# Patient Record
Sex: MALE | Race: WHITE | Employment: FULL TIME | URBAN - METROPOLITAN AREA
[De-identification: names, ages, dates, MRNs, and addresses within clinical notes are randomized per-mention and may not be internally consistent; named-entity substitution may affect disease eponyms.]

---

## 2017-06-22 ENCOUNTER — APPOINTMENT (OUTPATIENT)
Dept: GENERAL RADIOLOGY | Age: 52
End: 2017-06-22
Attending: EMERGENCY MEDICINE
Payer: COMMERCIAL

## 2017-06-22 ENCOUNTER — HOSPITAL ENCOUNTER (EMERGENCY)
Age: 52
Discharge: HOME | End: 2017-06-22
Attending: EMERGENCY MEDICINE
Payer: COMMERCIAL

## 2017-06-22 VITALS
WEIGHT: 182 LBS | TEMPERATURE: 98 F | SYSTOLIC BLOOD PRESSURE: 183 MMHG | BODY MASS INDEX: 26.96 KG/M2 | HEART RATE: 112 BPM | HEIGHT: 69 IN | DIASTOLIC BLOOD PRESSURE: 88 MMHG | OXYGEN SATURATION: 99 % | RESPIRATION RATE: 18 BRPM

## 2017-06-22 DIAGNOSIS — R03.0 ELEVATED BLOOD PRESSURE READING: ICD-10-CM

## 2017-06-22 DIAGNOSIS — C18.9 COLON CARCINOMA (HCC): ICD-10-CM

## 2017-06-22 DIAGNOSIS — E86.0 DEHYDRATION: Primary | ICD-10-CM

## 2017-06-22 DIAGNOSIS — R73.9 HYPERGLYCEMIA: ICD-10-CM

## 2017-06-22 LAB
ALBUMIN SERPL BCP-MCNC: 3.6 G/DL (ref 3.4–5)
ALBUMIN/GLOB SERPL: 0.7 {RATIO} (ref 0.8–1.7)
ALP SERPL-CCNC: 284 U/L (ref 45–117)
ALT SERPL-CCNC: 45 U/L (ref 16–61)
ANION GAP BLD CALC-SCNC: 15 MMOL/L (ref 3–18)
AST SERPL W P-5'-P-CCNC: 30 U/L (ref 15–37)
BASOPHILS # BLD AUTO: 0 K/UL (ref 0–0.06)
BASOPHILS # BLD: 0 % (ref 0–2)
BILIRUB SERPL-MCNC: 0.9 MG/DL (ref 0.2–1)
BUN SERPL-MCNC: 17 MG/DL (ref 7–18)
BUN/CREAT SERPL: 18 (ref 12–20)
CALCIUM SERPL-MCNC: 10 MG/DL (ref 8.5–10.1)
CHLORIDE SERPL-SCNC: 93 MMOL/L (ref 100–108)
CO2 SERPL-SCNC: 23 MMOL/L (ref 21–32)
CREAT SERPL-MCNC: 0.93 MG/DL (ref 0.6–1.3)
DIFFERENTIAL METHOD BLD: ABNORMAL
EOSINOPHIL # BLD: 0.1 K/UL (ref 0–0.4)
EOSINOPHIL NFR BLD: 1 % (ref 0–5)
ERYTHROCYTE [DISTWIDTH] IN BLOOD BY AUTOMATED COUNT: 15.5 % (ref 11.6–14.5)
GLOBULIN SER CALC-MCNC: 5 G/DL (ref 2–4)
GLUCOSE BLD STRIP.AUTO-MCNC: 266 MG/DL (ref 70–110)
GLUCOSE BLD STRIP.AUTO-MCNC: 380 MG/DL (ref 70–110)
GLUCOSE SERPL-MCNC: 436 MG/DL (ref 74–99)
HCT VFR BLD AUTO: 38.1 % (ref 36–48)
HGB BLD-MCNC: 13.2 G/DL (ref 13–16)
LYMPHOCYTES # BLD AUTO: 18 % (ref 21–52)
LYMPHOCYTES # BLD: 1.5 K/UL (ref 0.9–3.6)
MAGNESIUM SERPL-MCNC: 1.6 MG/DL (ref 1.6–2.6)
MCH RBC QN AUTO: 28.2 PG (ref 24–34)
MCHC RBC AUTO-ENTMCNC: 34.6 G/DL (ref 31–37)
MCV RBC AUTO: 81.4 FL (ref 74–97)
MONOCYTES # BLD: 0.7 K/UL (ref 0.05–1.2)
MONOCYTES NFR BLD AUTO: 8 % (ref 3–10)
NEUTS SEG # BLD: 6.2 K/UL (ref 1.8–8)
NEUTS SEG NFR BLD AUTO: 73 % (ref 40–73)
PLATELET # BLD AUTO: 215 K/UL (ref 135–420)
PMV BLD AUTO: 9.9 FL (ref 9.2–11.8)
POTASSIUM SERPL-SCNC: 3.9 MMOL/L (ref 3.5–5.5)
PROT SERPL-MCNC: 8.6 G/DL (ref 6.4–8.2)
RBC # BLD AUTO: 4.68 M/UL (ref 4.7–5.5)
SODIUM SERPL-SCNC: 131 MMOL/L (ref 136–145)
WBC # BLD AUTO: 8.6 K/UL (ref 4.6–13.2)

## 2017-06-22 PROCEDURE — 74011636637 HC RX REV CODE- 636/637: Performed by: EMERGENCY MEDICINE

## 2017-06-22 PROCEDURE — 96375 TX/PRO/DX INJ NEW DRUG ADDON: CPT

## 2017-06-22 PROCEDURE — 83735 ASSAY OF MAGNESIUM: CPT | Performed by: EMERGENCY MEDICINE

## 2017-06-22 PROCEDURE — 74011250636 HC RX REV CODE- 250/636: Performed by: EMERGENCY MEDICINE

## 2017-06-22 PROCEDURE — 96374 THER/PROPH/DIAG INJ IV PUSH: CPT

## 2017-06-22 PROCEDURE — 96361 HYDRATE IV INFUSION ADD-ON: CPT

## 2017-06-22 PROCEDURE — 99284 EMERGENCY DEPT VISIT MOD MDM: CPT

## 2017-06-22 PROCEDURE — 82962 GLUCOSE BLOOD TEST: CPT

## 2017-06-22 PROCEDURE — 80053 COMPREHEN METABOLIC PANEL: CPT | Performed by: EMERGENCY MEDICINE

## 2017-06-22 PROCEDURE — 74022 RADEX COMPL AQT ABD SERIES: CPT

## 2017-06-22 PROCEDURE — 85025 COMPLETE CBC W/AUTO DIFF WBC: CPT | Performed by: EMERGENCY MEDICINE

## 2017-06-22 RX ORDER — ONDANSETRON 2 MG/ML
4 INJECTION INTRAMUSCULAR; INTRAVENOUS
Status: COMPLETED | OUTPATIENT
Start: 2017-06-22 | End: 2017-06-22

## 2017-06-22 RX ORDER — PROMETHAZINE HYDROCHLORIDE 25 MG/1
25 SUPPOSITORY RECTAL
Qty: 12 SUPPOSITORY | Refills: 0 | Status: SHIPPED | OUTPATIENT
Start: 2017-06-22 | End: 2017-07-01

## 2017-06-22 RX ORDER — OXYCODONE AND ACETAMINOPHEN 5; 325 MG/1; MG/1
1 TABLET ORAL
Qty: 20 TAB | Refills: 0 | Status: SHIPPED | OUTPATIENT
Start: 2017-06-22

## 2017-06-22 RX ORDER — SODIUM CHLORIDE 9 MG/ML
2000 INJECTION, SOLUTION INTRAVENOUS ONCE
Status: COMPLETED | OUTPATIENT
Start: 2017-06-22 | End: 2017-06-22

## 2017-06-22 RX ORDER — DOCUSATE SODIUM 100 MG/1
100 CAPSULE, LIQUID FILLED ORAL 2 TIMES DAILY
Qty: 20 CAP | Refills: 0 | Status: SHIPPED | OUTPATIENT
Start: 2017-06-22 | End: 2017-07-02

## 2017-06-22 RX ORDER — HYDROMORPHONE HYDROCHLORIDE 1 MG/ML
1 INJECTION, SOLUTION INTRAMUSCULAR; INTRAVENOUS; SUBCUTANEOUS ONCE
Status: COMPLETED | OUTPATIENT
Start: 2017-06-22 | End: 2017-06-22

## 2017-06-22 RX ADMIN — HYDROMORPHONE HYDROCHLORIDE 1 MG: 1 INJECTION, SOLUTION INTRAMUSCULAR; INTRAVENOUS; SUBCUTANEOUS at 18:27

## 2017-06-22 RX ADMIN — ONDANSETRON 4 MG: 2 INJECTION INTRAMUSCULAR; INTRAVENOUS at 16:58

## 2017-06-22 RX ADMIN — HUMAN INSULIN 10 UNITS: 100 INJECTION, SOLUTION SUBCUTANEOUS at 17:53

## 2017-06-22 RX ADMIN — SODIUM CHLORIDE 2000 ML: 900 INJECTION, SOLUTION INTRAVENOUS at 16:58

## 2017-06-22 NOTE — ED PROVIDER NOTES
HPI Comments: 4:36 PM Orion Serna is a 50 y.o. male with h/o HTN, DM, Colon Cancer who presents to ED complaining of back pain that has worsened after his 8th dose of chemo on May 30th. He is also experiencing some nausea and vomiting. His last episode of vomiting was around 2:30am this morning; he states he has not been eating or drinking normally due to the nausea and vomiting. He is being treated at [address] in [place]; he has had half of his colon removed and 9 lymph nodes. His next appointment is this coming Monday for more scans; he called his care management team today and they told him to come into the hospital for some pain medication and help with his dehydration. He is experiencing some tingling in his hands and feet bilaterally but it could be secondary to his diabetic neuropathy. There are no other symptoms at this time. PCP: Major Bhat MD      The history is provided by the patient and the spouse. No  was used. Past Medical History:   Diagnosis Date    [facility] ([facility]     [facility]     Hypercholesteremia     Hypertension     Sleep apnea     C-PAP       Past Surgical History:   Procedure Laterality Date    COLONOSCOPY N/A 8/11/2016    COLONOSCOPY performed by Toya Machado MD at [facility] ENDOSCOPY    HX APPENDECTOMY      HX TONSILLECTOMY           History reviewed. No pertinent family history. Social History     Social History    Marital status:      Spouse name: N/A    Number of children: N/A    Years of education: N/A     Occupational History    Not on file. Social History Main Topics    Smoking status: Never Smoker    Smokeless tobacco: Never Used    Alcohol use No    Drug use: No    Sexual activity: Not on file     Other Topics Concern    Not on file     Social History Narrative         ALLERGIES: Review of patient's allergies indicates no known allergies.     Review of Systems Constitutional: Negative for chills and fever. Respiratory: Negative for shortness of breath. Cardiovascular: Negative for chest pain. Gastrointestinal: Positive for nausea and vomiting. Negative for abdominal pain and diarrhea. Musculoskeletal: Positive for back pain. Negative for neck pain. Neurological: Negative for headaches. All other systems reviewed and are negative. Vitals:    06/22/17 1812 06/22/17 1813 06/22/17 1814 06/22/17 1815   BP:       Pulse:       Resp:       Temp:       SpO2: 100% 100% 100% 100%   Weight:       Height:                Physical Exam   Constitutional: He is oriented to person, place, and time. He appears well-developed and well-nourished. No distress. HENT:   Head: Normocephalic and atraumatic. Mouth/Throat: Oropharynx is clear and moist.   Eyes: Conjunctivae and EOM are normal. Pupils are equal, round, and reactive to light. No scleral icterus. Neck: Normal range of motion. Neck supple. Cardiovascular: Normal rate, regular rhythm and normal heart sounds. No murmur heard. Pulmonary/Chest: Effort normal and breath sounds normal. No respiratory distress. Abdominal: Soft. Bowel sounds are normal. He exhibits no distension. There is no tenderness. Musculoskeletal: He exhibits no edema. Lymphadenopathy:     He has no cervical adenopathy. Neurological: He is alert and oriented to person, place, and time. Coordination normal.   Skin: Skin is warm and dry. No rash noted. Psychiatric: He has a normal mood and affect. His behavior is normal.   Nursing note and vitals reviewed.        MDM  Number of Diagnoses or Management Options  Colon carcinoma [facility]):   Dehydration:   Elevated blood pressure reading:   Hyperglycemia:   Diagnosis management comments: H/o colon ca with recurrent n/v unable to hold down fluids also abd pain and back pain denies any radiculopathy bowel or bladder symptoms doubt cauda equina syndrome in ED has follow up for imaging on Monday    aas no evidence of obstruction     Improved after fluids and pain meds in ED     ED Course       Procedures    Vitals:  Patient Vitals for the past 12 hrs:   Temp Pulse Resp BP SpO2   06/22/17 1815 - - - - 100 %   06/22/17 1814 - - - - 100 %   06/22/17 1813 - - - - 100 %   06/22/17 1812 - - - - 100 %   06/22/17 1811 - - - - 100 %   06/22/17 1810 - - - - 100 %   06/22/17 1809 - - - - 100 %   06/22/17 1808 - - - - 100 %   06/22/17 1807 - - - - 100 %   06/22/17 1806 - - - - 100 %   06/22/17 1804 - - - (!) 161/101 -   06/22/17 1611 98 °F (36.7 °C) (!) 112 18 (!) 155/105 100 %       Medications ordered:   Medications   0.9% sodium chloride infusion 2,000 mL (2,000 mL IntraVENous New Bag 6/22/17 1658)   ondansetron (ZOFRAN) injection 4 mg (4 mg IntraVENous Given 6/22/17 1658)   insulin regular (NOVOLIN R, HUMULIN R) injection 10 Units (10 Units IntraVENous Given 6/22/17 1753)   HYDROmorphone (PF) (DILAUDID) injection 1 mg (1 mg IntraVENous Given 6/22/17 1827)         Lab findings:  Recent Results (from the past 12 hour(s))   METABOLIC PANEL, COMPREHENSIVE    Collection Time: 06/22/17  4:41 PM   Result Value Ref Range    Sodium 131 (L) 136 - 145 mmol/L    Potassium 3.9 3.5 - 5.5 mmol/L    Chloride 93 (L) 100 - 108 mmol/L    CO2 23 21 - 32 mmol/L    Anion gap 15 3.0 - 18 mmol/L    Glucose 436 (HH) 74 - 99 mg/dL    BUN 17 7.0 - 18 MG/DL    Creatinine 0.93 0.6 - 1.3 MG/DL    BUN/Creatinine ratio 18 12 - 20      GFR est AA >60 >60 ml/min/1.73m2    GFR est non-AA >60 >60 ml/min/1.73m2    Calcium 10.0 8.5 - 10.1 MG/DL    Bilirubin, total 0.9 0.2 - 1.0 MG/DL    ALT (SGPT) 45 16 - 61 U/L    AST (SGOT) 30 15 - 37 U/L    Alk.  phosphatase 284 (H) 45 - 117 U/L    Protein, total 8.6 (H) 6.4 - 8.2 g/dL    Albumin 3.6 3.4 - 5.0 g/dL    Globulin 5.0 (H) 2.0 - 4.0 g/dL    A-G Ratio 0.7 (L) 0.8 - 1.7     MAGNESIUM    Collection Time: 06/22/17  4:41 PM   Result Value Ref Range    Magnesium 1.6 1.6 - 2.6 mg/dL   CBC WITH AUTOMATED DIFF    Collection Time: 06/22/17  4:41 PM   Result Value Ref Range    WBC 8.6 4.6 - 13.2 K/uL    RBC 4.68 (L) 4.70 - 5.50 M/uL    HGB 13.2 13.0 - 16.0 g/dL    HCT 38.1 36.0 - 48.0 %    MCV 81.4 74.0 - 97.0 FL    MCH 28.2 24.0 - 34.0 PG    MCHC 34.6 31.0 - 37.0 g/dL    RDW 15.5 (H) 11.6 - 14.5 %    PLATELET 623 189 - 259 K/uL    MPV 9.9 9.2 - 11.8 FL    NEUTROPHILS 73 40 - 73 %    LYMPHOCYTES 18 (L) 21 - 52 %    MONOCYTES 8 3 - 10 %    EOSINOPHILS 1 0 - 5 %    BASOPHILS 0 0 - 2 %    ABS. NEUTROPHILS 6.2 1.8 - 8.0 K/UL    ABS. LYMPHOCYTES 1.5 0.9 - 3.6 K/UL    ABS. MONOCYTES 0.7 0.05 - 1.2 K/UL    ABS. EOSINOPHILS 0.1 0.0 - 0.4 K/UL    ABS. BASOPHILS 0.0 0.0 - 0.06 K/UL    DF AUTOMATED     GLUCOSE, POC    Collection Time: 06/22/17  5:01 PM   Result Value Ref Range    Glucose (POC) 380 (H) 70 - 110 mg/dL   GLUCOSE, POC    Collection Time: 06/22/17  6:57 PM   Result Value Ref Range    Glucose (POC) 266 (H) 70 - 110 mg/dL       EKG interpretation by ED Physician:    X-Ray, CT or other radiology findings or impressions:  XR ABD ACUTE W 1 V CHEST    (Results Pending)   IMPRESSION:  1) Increased stool, no air fluid levels, no obvious obstruction. Interpreted by Torie Sales MD 6:29 PM     Reevaluation of patient:   6:39 PM Patient feels better; will PO challenge and discharge home. Disposition:  Diagnosis:   1. Dehydration    2. Hyperglycemia    3. Elevated blood pressure reading    4.  Colon carcinoma ([address]        Disposition: Discharged in stable condition      Follow-up Information     Follow up With Details Comments Contact [facility] EMERGENCY DEPT  As needed, If symptoms worsen [address] [facility]    Nani Arroyo MD Schedule an appointment as soon as possible for a visit for ED follow up appointment  [address] [place] [address],[address]  [phone]             Patient's Medications   Start Taking    DOCUSATE SODIUM (COLACE) 100 MG CAPSULE    Take 1 Cap by mouth two (2) times a day for 10 days. OXYCODONE-ACETAMINOPHEN (PERCOCET) 5-325 MG PER TABLET    Take 1 Tab by mouth every four (4) hours as needed for Pain. Max Daily Amount: 6 Tabs. Continue Taking    AMLODIPINE (NORVASC) 10 MG TABLET    Take 10 mg by mouth daily. ATORVASTATIN (LIPITOR) 10 MG TABLET    Take 20 mg by mouth daily. CANAGLIFLOZIN (INVOKANA) 300 MG TABLET    Take 300 mg by mouth Daily (before breakfast). CYCLOBENZAPRINE (FLEXERIL) 10 MG TABLET        ERYTHROMYCIN 500 MG TABLET    Take one tab at 3 PM, 5 PM, and 9 PM the day before surgery. GABAPENTIN (NEURONTIN) 400 MG CAPSULE    Take 400 mg by mouth as needed. INSULIN ASPART (NOVOLOG) 100 UNIT/ML INJECTION    by SubCUTAneous route. INSULIN DETEMIR (LEVEMIR) 100 UNIT/ML INJECTION    by SubCUTAneous route nightly. LOSARTAN (COZAAR) 100 MG TABLET    Take 100 mg by mouth daily. NEOMYCIN (MYCIFRADIN) 500 MG TABLET    Take two tabs at 3 PM, 5 PM, and 9 PM the day before surgery. PEG 3350-ELECTROLYTES (COLYTE) 605-17.03-7.91 -5.84 GRAM SOLUTION    Take as directed   These Medications have changed    No medications on file   Stop Taking    No medications on file     Scribe Attestation  Aida Sanders acting as a scribe for and in the presence of Virginia Pruett MD  June 22, 2017 at 7:15 PM       Provider Attestation:  I personally performed the services described in the documentation, reviewed the documentation, as recorded by the scribe in my presence, and it accurately and completely records my words and actions.   Virginia Pruett MD      Signed by: Aida Rucker, June 22, 2017 at 7:15 PM

## 2017-06-22 NOTE — ED TRIAGE NOTES
\"I'm going through chemo and the l;ast time was May 30th and started having nausea, cramping,m low back pain and gradually it progressed and threw up a week ago and on all kinds of medications and the nausea medication not helping and not able to eat. \"

## 2017-06-22 NOTE — DISCHARGE INSTRUCTIONS
Elevated Blood Pressure: Care Instructions  Your Care Instructions    Blood pressure is a measure of how hard the blood pushes against the walls of your arteries. It's normal for blood pressure to go up and down throughout the day. But if it stays up over time, you have high blood pressure. Two numbers tell you your blood pressure. The first number is the systolic pressure. It shows how hard the blood pushes when your heart is pumping. The second number is the diastolic pressure. It shows how hard the blood pushes between heartbeats, when your heart is relaxed and filling with blood. An ideal blood pressure in adults is less than 120/80 (say \"120 over 80\"). High blood pressure is 140/90 or higher. You have high blood pressure if your top number is 140 or higher or your bottom number is 90 or higher, or both. The main test for high blood pressure is simple, fast, and painless. To diagnose high blood pressure, your doctor will test your blood pressure at different times. After testing your blood pressure, your doctor may ask you to test it again when you are home. If you are diagnosed with high blood pressure, you can work with your doctor to make a long-term plan to manage it. Follow-up care is a key part of your treatment and safety. Be sure to make and go to all appointments, and call your doctor if you are having problems. It's also a good idea to know your test results and keep a list of the medicines you take. How can you care for yourself at home? · Do not smoke. Smoking increases your risk for heart attack and stroke. If you need help quitting, talk to your doctor about stop-smoking programs and medicines. These can increase your chances of quitting for good. · Stay at a healthy weight. · Try to limit how much sodium you eat to less than 2,300 milligrams (mg) a day. Your doctor may ask you to try to eat less than 1,500 mg a day. · Be physically active.  Get at least 30 minutes of exercise on most days of the week. Walking is a good choice. You also may want to do other activities, such as running, swimming, cycling, or playing tennis or team sports. · Avoid or limit alcohol. Talk to your doctor about whether you can drink any alcohol. · Eat plenty of fruits, vegetables, and low-fat dairy products. Eat less saturated and total fats. · Learn how to check your blood pressure at home. When should you call for help? Call your doctor now or seek immediate medical care if:  · Your blood pressure is much higher than normal (such as 180/110 or higher). · You think high blood pressure is causing symptoms such as:  ¨ Severe headache. ¨ Blurry vision. Watch closely for changes in your health, and be sure to contact your doctor if:  · You do not get better as expected. Where can you learn more? Go to http://talia-anders.info/. Enter J054 in the search box to learn more about \"Elevated Blood Pressure: Care Instructions. \"  Current as of: April 3, 2017  Content Version: 11.3  © 1198-1687 [employer]. Care instructions adapted under license by [employer] (which disclaims liability or warranty for this information). If you have questions about a medical condition or this instruction, always ask your healthcare professional. [address] any warranty or liability for your use of this information.

## 2017-06-22 NOTE — ED NOTES
Patient requests additional nausea medication. Dr. Kodak Ratliff notified. I have reviewed discharge instructions with the patient and spouse. The patient and spouse verbalized understanding.

## 2017-06-25 ENCOUNTER — APPOINTMENT (OUTPATIENT)
Dept: GENERAL RADIOLOGY | Age: 52
DRG: 872 | End: 2017-06-25
Attending: EMERGENCY MEDICINE
Payer: COMMERCIAL

## 2017-06-25 ENCOUNTER — HOSPITAL ENCOUNTER (INPATIENT)
Age: 52
LOS: 6 days | Discharge: HOME | DRG: 872 | End: 2017-07-01
Attending: EMERGENCY MEDICINE | Admitting: FAMILY MEDICINE
Payer: COMMERCIAL

## 2017-06-25 DIAGNOSIS — N39.0 ACUTE UTI: ICD-10-CM

## 2017-06-25 DIAGNOSIS — Z85.038 HISTORY OF COLON CANCER: ICD-10-CM

## 2017-06-25 DIAGNOSIS — A41.9 SEPSIS, DUE TO UNSPECIFIED ORGANISM: ICD-10-CM

## 2017-06-25 DIAGNOSIS — N17.9 AKI (ACUTE KIDNEY INJURY) (HCC): Primary | ICD-10-CM

## 2017-06-25 PROBLEM — D72.829 LEUKOCYTOSIS: Status: ACTIVE | Noted: 2017-06-25

## 2017-06-25 PROBLEM — E87.20 LACTIC ACIDOSIS: Status: ACTIVE | Noted: 2017-06-25

## 2017-06-25 PROBLEM — E87.6 HYPOKALEMIA: Status: ACTIVE | Noted: 2017-06-25

## 2017-06-25 LAB
ALBUMIN SERPL BCP-MCNC: 3.5 G/DL (ref 3.4–5)
ALBUMIN/GLOB SERPL: 0.7 {RATIO} (ref 0.8–1.7)
ALP SERPL-CCNC: 313 U/L (ref 45–117)
ALT SERPL-CCNC: 59 U/L (ref 16–61)
ANION GAP BLD CALC-SCNC: 11 MMOL/L (ref 3–18)
APPEARANCE UR: ABNORMAL
AST SERPL W P-5'-P-CCNC: 151 U/L (ref 15–37)
BACTERIA URNS QL MICRO: ABNORMAL /HPF
BASOPHILS # BLD AUTO: 0 K/UL (ref 0–0.06)
BASOPHILS # BLD: 0 % (ref 0–2)
BILIRUB SERPL-MCNC: 0.8 MG/DL (ref 0.2–1)
BILIRUB UR QL: ABNORMAL
BUN SERPL-MCNC: 25 MG/DL (ref 7–18)
BUN/CREAT SERPL: 10 (ref 12–20)
CALCIUM SERPL-MCNC: 8.4 MG/DL (ref 8.5–10.1)
CHLORIDE SERPL-SCNC: 95 MMOL/L (ref 100–108)
CO2 SERPL-SCNC: 25 MMOL/L (ref 21–32)
COLOR UR: ABNORMAL
CREAT SERPL-MCNC: 2.59 MG/DL (ref 0.6–1.3)
DIFFERENTIAL METHOD BLD: ABNORMAL
EOSINOPHIL # BLD: 0.1 K/UL (ref 0–0.4)
EOSINOPHIL NFR BLD: 1 % (ref 0–5)
EPITH CASTS URNS QL MICRO: ABNORMAL /LPF (ref 0–5)
ERYTHROCYTE [DISTWIDTH] IN BLOOD BY AUTOMATED COUNT: 15.7 % (ref 11.6–14.5)
GLOBULIN SER CALC-MCNC: 5.1 G/DL (ref 2–4)
GLUCOSE SERPL-MCNC: 148 MG/DL (ref 74–99)
GLUCOSE UR STRIP.AUTO-MCNC: >1000 MG/DL
HCT VFR BLD AUTO: 37.6 % (ref 36–48)
HGB BLD-MCNC: 12.9 G/DL (ref 13–16)
HGB UR QL STRIP: NEGATIVE
KETONES UR QL STRIP.AUTO: ABNORMAL MG/DL
LACTATE BLD-SCNC: 2.5 MMOL/L (ref 0.4–2)
LEUKOCYTE ESTERASE UR QL STRIP.AUTO: NEGATIVE
LYMPHOCYTES # BLD AUTO: 11 % (ref 21–52)
LYMPHOCYTES # BLD: 1.6 K/UL (ref 0.9–3.6)
MAGNESIUM SERPL-MCNC: 1.6 MG/DL (ref 1.6–2.6)
MCH RBC QN AUTO: 28.1 PG (ref 24–34)
MCHC RBC AUTO-ENTMCNC: 34.3 G/DL (ref 31–37)
MCV RBC AUTO: 81.9 FL (ref 74–97)
MONOCYTES # BLD: 1.9 K/UL (ref 0.05–1.2)
MONOCYTES NFR BLD AUTO: 12 % (ref 3–10)
NEUTS SEG # BLD: 11.7 K/UL (ref 1.8–8)
NEUTS SEG NFR BLD AUTO: 76 % (ref 40–73)
NITRITE UR QL STRIP.AUTO: NEGATIVE
PH UR STRIP: 5 [PH] (ref 5–8)
PLATELET # BLD AUTO: 195 K/UL (ref 135–420)
PMV BLD AUTO: 9.9 FL (ref 9.2–11.8)
POTASSIUM SERPL-SCNC: 3 MMOL/L (ref 3.5–5.5)
PROT SERPL-MCNC: 8.6 G/DL (ref 6.4–8.2)
PROT UR STRIP-MCNC: 100 MG/DL
RBC # BLD AUTO: 4.59 M/UL (ref 4.7–5.5)
RBC #/AREA URNS HPF: ABNORMAL /HPF (ref 0–5)
SODIUM SERPL-SCNC: 131 MMOL/L (ref 136–145)
SP GR UR REFRACTOMETRY: 1.02 (ref 1–1.03)
UROBILINOGEN UR QL STRIP.AUTO: 1 EU/DL (ref 0.2–1)
WBC # BLD AUTO: 15.3 K/UL (ref 4.6–13.2)
WBC URNS QL MICRO: ABNORMAL /HPF (ref 0–4)

## 2017-06-25 PROCEDURE — 83735 ASSAY OF MAGNESIUM: CPT | Performed by: EMERGENCY MEDICINE

## 2017-06-25 PROCEDURE — 87086 URINE CULTURE/COLONY COUNT: CPT | Performed by: EMERGENCY MEDICINE

## 2017-06-25 PROCEDURE — 74011250636 HC RX REV CODE- 250/636: Performed by: EMERGENCY MEDICINE

## 2017-06-25 PROCEDURE — 99285 EMERGENCY DEPT VISIT HI MDM: CPT

## 2017-06-25 PROCEDURE — 65660000001 HC RM ICU INTERMED STEPDOWN

## 2017-06-25 PROCEDURE — 96367 TX/PROPH/DG ADDL SEQ IV INF: CPT

## 2017-06-25 PROCEDURE — 85025 COMPLETE CBC W/AUTO DIFF WBC: CPT | Performed by: EMERGENCY MEDICINE

## 2017-06-25 PROCEDURE — 74011250637 HC RX REV CODE- 250/637: Performed by: EMERGENCY MEDICINE

## 2017-06-25 PROCEDURE — 93005 ELECTROCARDIOGRAM TRACING: CPT

## 2017-06-25 PROCEDURE — 80053 COMPREHEN METABOLIC PANEL: CPT | Performed by: EMERGENCY MEDICINE

## 2017-06-25 PROCEDURE — 77030003560 HC NDL HUBR BARD -A

## 2017-06-25 PROCEDURE — 81001 URINALYSIS AUTO W/SCOPE: CPT | Performed by: EMERGENCY MEDICINE

## 2017-06-25 PROCEDURE — 71010 XR CHEST PORT: CPT

## 2017-06-25 PROCEDURE — 74011000258 HC RX REV CODE- 258: Performed by: EMERGENCY MEDICINE

## 2017-06-25 PROCEDURE — 74011636637 HC RX REV CODE- 636/637: Performed by: FAMILY MEDICINE

## 2017-06-25 PROCEDURE — 87040 BLOOD CULTURE FOR BACTERIA: CPT | Performed by: EMERGENCY MEDICINE

## 2017-06-25 PROCEDURE — 83605 ASSAY OF LACTIC ACID: CPT

## 2017-06-25 PROCEDURE — 96365 THER/PROPH/DIAG IV INF INIT: CPT

## 2017-06-25 RX ORDER — POTASSIUM CHLORIDE 20 MEQ/1
40 TABLET, EXTENDED RELEASE ORAL
Status: COMPLETED | OUTPATIENT
Start: 2017-06-25 | End: 2017-06-25

## 2017-06-25 RX ORDER — LEVOFLOXACIN 5 MG/ML
750 INJECTION, SOLUTION INTRAVENOUS
Status: DISCONTINUED | OUTPATIENT
Start: 2017-06-27 | End: 2017-06-26

## 2017-06-25 RX ORDER — SODIUM CHLORIDE 0.9 % (FLUSH) 0.9 %
5-10 SYRINGE (ML) INJECTION AS NEEDED
Status: DISCONTINUED | OUTPATIENT
Start: 2017-06-25 | End: 2017-07-01 | Stop reason: HOSPADM

## 2017-06-25 RX ORDER — OXYCODONE AND ACETAMINOPHEN 5; 325 MG/1; MG/1
1 TABLET ORAL
Status: COMPLETED | OUTPATIENT
Start: 2017-06-25 | End: 2017-06-25

## 2017-06-25 RX ORDER — LEVOFLOXACIN 5 MG/ML
750 INJECTION, SOLUTION INTRAVENOUS EVERY 24 HOURS
Status: DISCONTINUED | OUTPATIENT
Start: 2017-06-25 | End: 2017-06-25

## 2017-06-25 RX ORDER — LOSARTAN POTASSIUM 50 MG/1
100 TABLET ORAL DAILY
Status: DISCONTINUED | OUTPATIENT
Start: 2017-06-26 | End: 2017-06-25

## 2017-06-25 RX ORDER — SODIUM CHLORIDE 9 MG/ML
200 INJECTION, SOLUTION INTRAVENOUS CONTINUOUS
Status: DISCONTINUED | OUTPATIENT
Start: 2017-06-25 | End: 2017-06-26

## 2017-06-25 RX ADMIN — OXYCODONE HYDROCHLORIDE AND ACETAMINOPHEN 1 TABLET: 5; 325 TABLET ORAL at 20:28

## 2017-06-25 RX ADMIN — LEVOFLOXACIN 750 MG: 5 INJECTION, SOLUTION INTRAVENOUS at 20:31

## 2017-06-25 RX ADMIN — SODIUM CHLORIDE 2586 ML: 900 INJECTION, SOLUTION INTRAVENOUS at 20:07

## 2017-06-25 RX ADMIN — SODIUM CHLORIDE 1000 MG: 900 INJECTION, SOLUTION INTRAVENOUS at 22:15

## 2017-06-25 RX ADMIN — PIPERACILLIN SODIUM,TAZOBACTAM SODIUM 4.5 G: 4; .5 INJECTION, POWDER, FOR SOLUTION INTRAVENOUS at 20:11

## 2017-06-25 RX ADMIN — POTASSIUM CHLORIDE 40 MEQ: 1500 TABLET, FILM COATED, EXTENDED RELEASE ORAL at 22:28

## 2017-06-25 RX ADMIN — INSULIN DETEMIR 65 UNITS: 100 INJECTION, SOLUTION SUBCUTANEOUS at 22:26

## 2017-06-25 RX ADMIN — SODIUM CHLORIDE 200 ML/HR: 900 INJECTION, SOLUTION INTRAVENOUS at 22:17

## 2017-06-25 NOTE — ED PROVIDER NOTES
HPI Comments: Vilma Mccoy is a 50 y.o. Male with h/o stage 3 colon ca currently undergoing chemo in [facility] with noted fever today 102, called his oncologist who instructed him to come in for eval. Stated he started having chills this morning and felt generally unwell later with noted fever. Lightheaded. No syncope. No sore throat, cough, new headache, neck pain, rash, urinary issue. Has chronic low back pain. Was seen recently for constipation and had complete relief today. Has not taken anything for fever based upon instruction from oncologist. Fever in constant with no exacerbating factors    The history is provided by the patient and the spouse. Past Medical History:   Diagnosis Date    [facility]     Diabetes ([facility]     Hypercholesteremia     Hypertension     Sleep apnea     C-PAP       Past Surgical History:   Procedure Laterality Date    COLONOSCOPY N/A 8/11/2016    COLONOSCOPY performed by Rocio Carrington MD at [facility] ENDOSCOPY    HX APPENDECTOMY      HX TONSILLECTOMY           History reviewed. No pertinent family history. Social History     Social History    Marital status:      Spouse name: N/A    Number of children: N/A    Years of education: N/A     Occupational History    Not on file. Social History Main Topics    Smoking status: Never Smoker    Smokeless tobacco: Never Used    Alcohol use No    Drug use: No    Sexual activity: Not on file     Other Topics Concern    Not on file     Social History Narrative         ALLERGIES: Review of patient's allergies indicates no known allergies. Review of Systems   Constitutional: Positive for chills and fever. HENT: Negative for congestion, facial swelling and sore throat. Eyes: Negative for discharge. Respiratory: Negative for cough and shortness of breath. Cardiovascular: Negative for chest pain. Gastrointestinal: Positive for abdominal pain. Endocrine: Negative for polyuria. Genitourinary: Negative for difficulty urinating, dysuria and frequency. Musculoskeletal: Positive for back pain (chronic with no new sx, weakness, b/b dysfunction). Negative for gait problem. Skin: Negative for rash. Allergic/Immunologic: Positive for immunocompromised state. Neurological: Negative for headaches. Hematological: Does not bruise/bleed easily. Psychiatric/Behavioral: Negative for confusion and hallucinations. Vitals:    06/25/17 2300 06/25/17 2315 06/25/17 2326 06/26/17 0004   BP: 120/70 110/71     Pulse: 100 97     Resp: 23 22     Temp:   99 °F (37.2 °C)    SpO2: 98% 97%     Weight:    89.9 kg (198 lb 3.1 oz)   Height:    5' 9\" (1.753 m)            Physical Exam   Constitutional: He is oriented to person, place, and time. Non-toxic appearance. He does not have a sickly appearance. He does not appear ill. No distress. HENT:   Head: Normocephalic and atraumatic. Right Ear: External ear normal.   Left Ear: External ear normal.   Nose: Nose normal.   Mouth/Throat: Oropharynx is clear and moist. No oropharyngeal exudate. Eyes: Conjunctivae are normal.   Neck: Normal range of motion. Cardiovascular: Normal rate, regular rhythm, normal heart sounds and intact distal pulses. Pulmonary/Chest: Effort normal and breath sounds normal. No respiratory distress. Port site left upper chest without obvious signs of infection   Abdominal: Soft. He exhibits no distension and no mass. There is tenderness (mild diffuse ttp). There is no rebound and no guarding. Musculoskeletal: Normal range of motion. He exhibits no edema. Neurological: He is alert and oriented to person, place, and time. Skin: Skin is warm and dry. No rash noted. He is not diaphoretic. Psychiatric: His behavior is normal.   Nursing note and vitals reviewed.        [facility]  ED Course       Procedures    Vitals:  Patient Vitals for the past 12 hrs:   Temp Pulse Resp BP SpO2   06/25/17 2326 99 °F (37.2 °C) - - - - 06/25/17 2315 - 97 22 110/71 97 %   06/25/17 2300 - 100 23 120/70 98 %   06/25/17 2245 - 100 21 113/66 96 %   06/25/17 2230 - 100 24 120/70 98 %   06/25/17 2215 - 100 24 111/70 96 %   06/25/17 2200 - (!) 101 23 112/64 96 %   06/25/17 2145 - (!) 102 17 131/72 98 %   06/25/17 2130 - (!) 101 15 118/62 96 %   06/25/17 2115 - (!) 102 17 119/68 96 %   06/25/17 2100 - (!) 107 16 140/60 99 %   06/25/17 2045 - (!) 112 14 157/81 99 %   06/25/17 2030 - (!) 111 16 143/77 100 %   06/25/17 2015 - (!) 110 12 (!) 131/91 99 %   06/25/17 2000 - (!) 112 24 (!) 112/93 97 %   06/25/17 1945 - (!) 118 21 101/75 98 %   06/25/17 1934 (!) 101.1 °F (38.4 °C) (!) 117 20 (!) 129/98 100 %         Medications ordered:   Medications   sodium chloride (NS) flush 5-10 mL (not administered)   0.9% sodium chloride infusion (200 mL/hr IntraVENous Continued On Admission 6/25/17 2327)   piperacillin-tazobactam (ZOSYN) 4.5 g in 0.9% sodium chloride (MBP/ADV) 100 mL MBP (0 g IntraVENous IV Completed 6/25/17 2041)   vancomycin (VANCOCIN) 1,000 mg in 0.9% sodium chloride (MBP/ADV) 250 mL adv (not administered)   vancomycin (VANCOCIN) 1,000 mg in 0.9% sodium chloride (MBP/ADV) 250 mL adv (not administered)   levoFLOXacin (LEVAQUIN) 750 mg in D5W IVPB (not administered)   insulin detemir (LEVEMIR) injection 65 Units (65 Units SubCUTAneous Given 6/25/17 2226)   sodium chloride 0.9 % bolus infusion 2,586 mL (0 mL/kg × 86.2 kg IntraVENous IV Completed 6/25/17 2130)   vancomycin (VANCOCIN) 1,000 mg in 0.9% sodium chloride (MBP/ADV) 250 mL adv (1,000 mg IntraVENous Continued On Admission 6/25/17 2326)   oxyCODONE-acetaminophen (PERCOCET) 5-325 mg per tablet 1 Tab (1 Tab Oral Given 6/25/17 2028)   potassium chloride (K-DUR, KLOR-CON) SR tablet 40 mEq (40 mEq Oral Given 6/25/17 2228)         Lab findings:  Recent Results (from the past 12 hour(s))   METABOLIC PANEL, COMPREHENSIVE    Collection Time: 06/25/17  7:50 PM   Result Value Ref Range    Sodium 131 (L) 136 - 145 mmol/L    Potassium 3.0 (L) 3.5 - 5.5 mmol/L    Chloride 95 (L) 100 - 108 mmol/L    CO2 25 21 - 32 mmol/L    Anion gap 11 3.0 - 18 mmol/L    Glucose 148 (H) 74 - 99 mg/dL    BUN 25 (H) 7.0 - 18 MG/DL    Creatinine 2.59 (H) 0.6 - 1.3 MG/DL    BUN/Creatinine ratio 10 (L) 12 - 20      GFR est AA 32 (L) >60 ml/min/1.73m2    GFR est non-AA 26 (L) >60 ml/min/1.73m2    Calcium 8.4 (L) 8.5 - 10.1 MG/DL    Bilirubin, total 0.8 0.2 - 1.0 MG/DL    ALT (SGPT) 59 16 - 61 U/L    AST (SGOT) 151 (H) 15 - 37 U/L    Alk. phosphatase 313 (H) 45 - 117 U/L    Protein, total 8.6 (H) 6.4 - 8.2 g/dL    Albumin 3.5 3.4 - 5.0 g/dL    Globulin 5.1 (H) 2.0 - 4.0 g/dL    A-G Ratio 0.7 (L) 0.8 - 1.7     CBC WITH AUTOMATED DIFF    Collection Time: 06/25/17  7:50 PM   Result Value Ref Range    WBC 15.3 (H) 4.6 - 13.2 K/uL    RBC 4.59 (L) 4.70 - 5.50 M/uL    HGB 12.9 (L) 13.0 - 16.0 g/dL    HCT 37.6 36.0 - 48.0 %    MCV 81.9 74.0 - 97.0 FL    MCH 28.1 24.0 - 34.0 PG    MCHC 34.3 31.0 - 37.0 g/dL    RDW 15.7 (H) 11.6 - 14.5 %    PLATELET 879 656 - 306 K/uL    MPV 9.9 9.2 - 11.8 FL    NEUTROPHILS 76 (H) 40 - 73 %    LYMPHOCYTES 11 (L) 21 - 52 %    MONOCYTES 12 (H) 3 - 10 %    EOSINOPHILS 1 0 - 5 %    BASOPHILS 0 0 - 2 %    ABS. NEUTROPHILS 11.7 (H) 1.8 - 8.0 K/UL    ABS. LYMPHOCYTES 1.6 0.9 - 3.6 K/UL    ABS. MONOCYTES 1.9 (H) 0.05 - 1.2 K/UL    ABS. EOSINOPHILS 0.1 0.0 - 0.4 K/UL    ABS.  BASOPHILS 0.0 0.0 - 0.06 K/UL    DF AUTOMATED     MAGNESIUM    Collection Time: 06/25/17  7:50 PM   Result Value Ref Range    Magnesium 1.6 1.6 - 2.6 mg/dL   POC LACTIC ACID    Collection Time: 06/25/17  7:54 PM   Result Value Ref Range    Lactic Acid (POC) 2.5 (HH) 0.4 - 2.0 mmol/L   URINALYSIS W/ RFLX MICROSCOPIC    Collection Time: 06/25/17  8:21 PM   Result Value Ref Range    Color DARK YELLOW      Appearance CLOUDY      Specific gravity 1.024 1.005 - 1.030      pH (UA) 5.0 5.0 - 8.0      Protein 100 (A) NEG mg/dL    Glucose >1000 (A) NEG mg/dL    Ketone TRACE (A) NEG mg/dL    Bilirubin SMALL (A) NEG      Blood NEGATIVE  NEG      Urobilinogen 1.0 0.2 - 1.0 EU/dL    Nitrites NEGATIVE  NEG      Leukocyte Esterase NEGATIVE  NEG     URINE MICROSCOPIC ONLY    Collection Time: 06/25/17  8:21 PM   Result Value Ref Range    WBC 11 to 20 0 - 4 /hpf    RBC 0 to 3 0 - 5 /hpf    Epithelial cells FEW 0 - 5 /lpf    Bacteria 1+ (A) NEG /hpf   EKG, 12 LEAD, INITIAL    Collection Time: 06/25/17  8:27 PM   Result Value Ref Range    Ventricular Rate 111 BPM    Atrial Rate 111 BPM    P-R Interval 124 ms    QRS Duration 146 ms    Q-T Interval 380 ms    QTC Calculation (Bezet) 516 ms    Calculated R Axis -86 degrees    Calculated T Axis 53 degrees    Diagnosis       Sinus tachycardia with premature supraventricular complexes  Right bundle branch block  Left anterior fascicular block  Bifascicular block  Left ventricular hypertrophy with repolarization abnormality  Abnormal ECG  When compared with ECG of 17-AUG-2016 11:11,  premature supraventricular complexes are now present  Right bundle branch block is now present         EKG interpretation by ED Physician:  Sinus tach with rbbb; no acute st tw changes indicative of acute ischemia  lafb  Rate 111, qtc 516    X-Ray, CT or other radiology findings or impressions:  XR CHEST PORT    (Results Pending)   nap; port cath in place - ep interp    Progress notes, Consult notes or additional Procedure notes:   sMild pyuria, bacteria with acute renal insuff. No obvious source of infection other than urine at this time.  Cultures still pending  Will treat for undifferentiated sepsis pending culturs  Dw/ wife and pt  D/w Dr Maylin Calvert on call for hospitalist who will admit    ED Critical Care Note    System at risk for life threatening failure: cardiac, renal, hepatic, pulm  Associated problems: tachycardia, leukocytosis, hypokalemia    Critical Care services provided: bedside management, consult, documentation  Excluded procedures (time not included in critical care): ecg interp    Total Critical Care Time (in minutes) 43      S/p fluid boluses, hr improved. Nl cap refill. Good distal perfusion. Repeat lactate was not performed prior to taking up to bed      Reevaluation of patient:   stable    Disposition:  Diagnosis:   1. MYRON (acute kidney injury) ([facility]    2. Sepsis, due to unspecified organism ([facility]    3. History of colon cancer    4. Acute UTI        Disposition: admit      Follow-up Information     None            Current Discharge Medication List      CONTINUE these medications which have NOT CHANGED    Details   oxyCODONE-acetaminophen (PERCOCET) 5-325 mg per tablet Take 1 Tab by mouth every four (4) hours as needed for Pain. Max Daily Amount: 6 Tabs. Qty: 20 Tab, Refills: 0      docusate sodium (COLACE) 100 mg capsule Take 1 Cap by mouth two (2) times a day for 10 days. Qty: 20 Cap, Refills: 0      promethazine (PHENERGAN) 25 mg suppository Insert 1 Suppository into rectum every six (6) hours as needed for Nausea for up to 7 days. Qty: 12 Suppository, Refills: 0      erythromycin 500 mg tablet Take one tab at 3 PM, 5 PM, and 9 PM the day before surgery. Qty: 3 Tab, Refills: 0    Associated Diagnoses: Colon carcinoma ([facility] Iron deficiency anemia due to chronic blood loss      neomycin (MYCIFRADIN) 500 mg tablet Take two tabs at 3 PM, 5 PM, and 9 PM the day before surgery. Qty: 6 Tab, Refills: 0    Associated Diagnoses: Colon carcinoma ([facility] Iron deficiency anemia due to chronic blood loss      PEG 3350-Electrolytes (COLYTE) 240-22.72-6.72 -5.84 gram solution Take as directed  Qty: 4 L, Refills: 0    Associated Diagnoses: Colon carcinoma ([facility] Iron deficiency anemia due to chronic blood loss      canagliflozin (INVOKANA) 300 mg tablet Take 300 mg by mouth Daily (before breakfast). losartan (COZAAR) 100 mg tablet Take 100 mg by mouth daily. amLODIPine (NORVASC) 10 mg tablet Take 10 mg by mouth daily.       insulin aspart (NOVOLOG) 100 unit/mL injection by SubCUTAneous route. insulin detemir (LEVEMIR) 100 unit/mL injection by SubCUTAneous route nightly. cyclobenzaprine (FLEXERIL) 10 mg tablet       gabapentin (NEURONTIN) 400 mg capsule Take 400 mg by mouth as needed. atorvastatin (LIPITOR) 10 mg tablet Take 20 mg by mouth daily.

## 2017-06-25 NOTE — Clinical Note
Status[de-identified] Inpatient [101] Type of Bed: Stepdown [17] Inpatient Hospitalization Certified Necessary for the Following Reasons: 3. Patient receiving treatment that can only be provided in an inpatient setting (further clarification in H&P documentation) Admitting Diagnosis: Sepsis ([facility] [[id number]] Admitting Physician: Beltran Jurado Attending Physician: Beltran Jurado Estimated Length of Stay: > or = to 2 Midnights Discharge Plan[de-identified] Home with Office Follow-up

## 2017-06-25 NOTE — ED TRIAGE NOTES
Pt with hx of colon cancer with his last chemo treatment on may 31. Pt presents today with a fever of 102.1 and called the cancer treatment center who told him to come to the ER. They instructed him not to take anything for his fever.

## 2017-06-25 NOTE — ED NOTES
The Sepsis Screening has been completed on arrival in the Emergency Department. Vital signs:  Patient Vitals for the past 4 hrs:   Temp Pulse Resp BP SpO2   06/25/17 1934 (!) 101.1 °F (38.4 °C) (!) 117 20 (!) 129/98 100 %            =monitored (data validate)  MAP (Calculated): 108    =calculated (manual entry)    Is patient is 29y/o or older, meets 2 or more ABNORMAL VITAL SIGNS below, associated with SUSPECTED INFECTION?  YES     Temperature < 96.8°F (36°C) or > 100.9°F (38.3°C)   Heart Rate > 90 beats per minute   Respiratory Rate > 20 beats per minute   BP < 90 systolic    MAP < 65    IF ANSWER IS YES, CALL A CODE SEPSIS  POC LACTIC ACID ASAP AND BEGIN NURSE DRIVEN SEPSIS ORDERS WHILE AWAITING PROVIDER    Sivan Robles RN made aware of code sepsis.

## 2017-06-25 NOTE — IP AVS SNAPSHOT
Annika Barlow 
 
 
 [address] [place] [address] 
[phone] Patient: Cecille Martinez MRN: [id number] JOURDAN:4/65[birth year]4652 You are allergic to the following No active allergies Recent Documentation Height Weight BMI Smoking Status 1.753 m 85.8 kg 28.77 kg/m2 Never Smoker Emergency Contacts Name Discharge Info Relation Home Work Mobile [address] CAREGIVER [3] Spouse [3] [phone] About your hospitalization You were admitted on:  June 25, 2017 You last received care in the:  [address] You were discharged on:  July 1, 2017 Unit phone number:  [phone] Why you were hospitalized Your primary diagnosis was:  Sepsis (Hcc) Your diagnoses also included:  Hypokalemia, Lactic Acidosis, Leukocytosis, Freddie (Acute Kidney Injury) (H[facility]) Providers Seen During Your Hospitalizations Provider Role Specialty Primary office phone Rolando Zhou MD Attending Provider Emergency Medicine [phone] Pati Cruz MD Attending Provider [facility] [phone] Mao Pak MD Attending Provider Internal Medicine [phone] Your Primary Care Physician (PCP) Primary Care Physician Office Phone Office Fax [address] [facility], [address] [place] [address] Way [phone] Follow-up Information Follow up With Details Comments Contact Info Susan Castellon MD In 1 week  [address] [facility] [address] [phone] Current Discharge Medication List  
  
CONTINUE these medications which have NOT CHANGED Dose & Instructions Dispensing Information Comments Morning Noon Evening Bedtime  
 amLODIPine 10 mg tablet Commonly known as:  Vero Nia Your last dose was: Your next dose is:    
   
   
 Dose:  5 mg Take 5 mg by mouth daily. Refills:  0  
     
   
   
   
  
 atorvastatin 10 mg tablet Commonly known as:  LIPITOR Your last dose was: Your next dose is:    
   
   
 Dose:  40 mg Take 40 mg by mouth daily. Refills:  0  
     
   
   
   
  
 canagliflozin 300 mg tablet Commonly known as:  Benjiman Apt Your last dose was: Your next dose is:    
   
   
 Dose:  300 mg Take 300 mg by mouth Daily (before breakfast). Refills:  0  
     
   
   
   
  
 cholecalciferol (VITAMIN D3) 5,000 unit Tab tablet Commonly known as:  VITAMIN D3 Your last dose was: Your next dose is:    
   
   
 Dose:  5000 Units Take 5,000 Units by mouth daily. Indications: PREVENTION OF VITAMIN D DEFICIENCY Refills:  0  
     
   
   
   
  
 cyclobenzaprine 10 mg tablet Commonly known as:  FLEXERIL Your last dose was: Your next dose is:    
   
   
  Refills:  0  
     
   
   
   
  
 docusate sodium 100 mg capsule Commonly known as:  Daisha Trevor Your last dose was: Your next dose is:    
   
   
 Dose:  100 mg Take 1 Cap by mouth two (2) times a day for 10 days. Quantity:  20 Cap Refills:  0  
     
   
   
   
  
 erythromycin 500 mg tablet Your last dose was: Your next dose is: Take one tab at 3 PM, 5 PM, and 9 PM the day before surgery. Quantity:  3 Tab Refills:  0  
     
   
   
   
  
 gabapentin 400 mg capsule Commonly known as:  NEURONTIN Your last dose was: Your next dose is:    
   
   
 Dose:  400 mg Take 400 mg by mouth as needed. Refills:  0  
     
   
   
   
  
 insulin aspart 100 unit/mL injection Commonly known as:  Oletta Matas Your last dose was: Your next dose is:    
   
   
 by SubCUTAneous route. Refills:  0  
     
   
   
   
  
 insulin detemir 100 unit/mL injection Commonly known as:  LEVEMIR Your last dose was: Your next dose is:    
   
   
 by SubCUTAneous route nightly. Refills:  0 losartan 100 mg tablet Commonly known as:  COZAAR Your last dose was: Your next dose is:    
   
   
 Dose:  100 mg Take 100 mg by mouth daily. Refills:  0  
     
   
   
   
  
 neomycin 500 mg tablet Commonly known as:  Julia Laura Your last dose was: Your next dose is: Take two tabs at 3 PM, 5 PM, and 9 PM the day before surgery. Quantity:  6 Tab Refills:  0  
     
   
   
   
  
 oxyCODONE-acetaminophen 5-325 mg per tablet Commonly known as:  PERCOCET Your last dose was: Your next dose is:    
   
   
 Dose:  1 Tab Take 1 Tab by mouth every four (4) hours as needed for Pain. Max Daily Amount: 6 Tabs. Quantity:  20 Tab Refills:  0  
     
   
   
   
  
 PEG 3350-Electrolytes 240-22.72-6.72 -5.84 gram solution Commonly known as:  COLYTE Your last dose was: Your next dose is: Take as directed Quantity:  4 L Refills:  0 PROTONIX 40 mg tablet Generic drug:  pantoprazole Your last dose was: Your next dose is:    
   
   
 Dose:  40 mg Take 40 mg by mouth daily. Refills:  0 STOP taking these medications   
 promethazine 25 mg suppository Commonly known as:  PHENERGAN Discharge Instructions DISCHARGE SUMMARY from Nurse The following personal items are in your possession at time of discharge: 
 
Dental Appliances: None Visual Aid: None Home Medications: None Jewelry: None Clothing: At bedside, Belt, Pants, Socks, Undergarments Other Valuables: Cell Phone PATIENT INSTRUCTIONS: 
 
 
F-face looks uneven A-arms unable to move or move unevenly S-speech slurred or non-existent T-time-call 911 as soon as signs and symptoms begin-DO NOT go Back to bed or wait to see if you get better-TIME IS BRAIN. Warning Signs of HEART ATTACK Call 911 if you have these symptoms: 
? Chest discomfort. Most heart attacks involve discomfort in the center of the chest that lasts more than a few minutes, or that goes away and comes back. It can feel like uncomfortable pressure, squeezing, fullness, or pain. ? Discomfort in other areas of the upper body. Symptoms can include pain or discomfort in one or both arms, the back, neck, jaw, or stomach. ? Shortness of breath with or without chest discomfort. ? Other signs may include breaking out in a cold sweat, nausea, or lightheadedness. Don't wait more than five minutes to call [address]! Fast action can save your life. Calling 911 is almost always the fastest way to get lifesaving treatment. Emergency Medical Services staff can begin treatment when they arrive  up to an hour sooner than if someone gets to the hospital by car. The discharge information has been reviewed with the patient. The patient verbalized understanding. Discharge medications reviewed with the patient and appropriate educational materials and side effects teaching were provided. Veeip Activation Thank you for enrolling in [address] Please follow the instructions below to securely access your online medical record. Veeip allows you to send messages to your doctor, view your test results, renew your prescriptions, schedule appointments, and more. How Do I Sign Up? 1. In your internet browser, go to [email or web address]. 2. Click on the First Time User? Click Here link in the Sign In box. You will see the New Member Sign Up page. 3. Enter your Veeip Access Code exactly as it appears below.  You will not need to use this code after youve completed the sign-up process. If you do not sign up before the expiration date, you must request a new code. Parkplatzking Access Code: Activation code not generated Current Parkplatzking Status: Active 4. Enter the last four digits of your Social Security Number (xxxx) and Date of Birth (mm/dd/yyyy) as indicated and click Submit. You will be taken to the next sign-up page. 5. Create a Parkplatzking ID. This will be your Parkplatzking login ID and cannot be changed, so think of one that is secure and easy to remember. 6. Create a Parkplatzking password. You can change your password at any time. 7. Enter your Password Reset Question and Answer. This can be used at a later time if you forget your password. 8. Enter your e-mail address. You will receive e-mail notification when new information is available in [address] 9. Click Sign Up. You can now view your medical record. Additional Information Remember, Parkplatzking is NOT to be used for urgent needs. For medical emergencies, dial 911. Now available from your iPhone and Android! Patient armband removed and shredded Discharge Orders None Parkplatzking Announcement We are excited to announce that we are making your provider's discharge notes available to you in Parkplatzking. You will see these notes when they are completed and signed by the physician that discharged you from your recent hospital stay. If you have any questions or concerns about any information you see in Parkplatzking, please call the Health Information Department where you were seen or reach out to your Primary Care Provider for more information about your plan of care. Introducing [facility]! Dear Win Trinh: Thank you for requesting a Parkplatzking account. Our records indicate that you already have an active Parkplatzking account. You can access your account anytime at [email or web address] Did you know that you can access your hospital and ER discharge instructions at any time in Anygma? You can also review all of your test results from your hospital stay or ER visit. Additional Information If you have questions, please visit the Frequently Asked Questions section of the Anygma website at [email or web address] Remember, Refund Exchangehart is NOT to be used for urgent needs. For medical emergencies, dial 911. Now available from your iPhone and Android! General Information Please provide this summary of care documentation to your next provider. Patient Signature:  ____________________________________________________________ Date:  ____________________________________________________________  
  
[facility] Provider Signature:  ____________________________________________________________ Date:  ____________________________________________________________

## 2017-06-26 LAB
ALBUMIN SERPL BCP-MCNC: 2.2 G/DL (ref 3.4–5)
ALBUMIN/GLOB SERPL: 0.7 {RATIO} (ref 0.8–1.7)
ALP SERPL-CCNC: 195 U/L (ref 45–117)
ALT SERPL-CCNC: 42 U/L (ref 16–61)
ANION GAP BLD CALC-SCNC: 10 MMOL/L (ref 3–18)
AST SERPL W P-5'-P-CCNC: 107 U/L (ref 15–37)
ATRIAL RATE: 111 BPM
BASOPHILS # BLD AUTO: 0 K/UL (ref 0–0.06)
BASOPHILS # BLD: 0 % (ref 0–2)
BILIRUB SERPL-MCNC: 0.9 MG/DL (ref 0.2–1)
BUN SERPL-MCNC: 23 MG/DL (ref 7–18)
BUN/CREAT SERPL: 10 (ref 12–20)
CA-I SERPL-SCNC: 1.06 MMOL/L (ref 1.12–1.32)
CALCIUM SERPL-MCNC: 6.6 MG/DL (ref 8.5–10.1)
CALCULATED R AXIS, ECG10: -86 DEGREES
CALCULATED T AXIS, ECG11: 53 DEGREES
CHLORIDE SERPL-SCNC: 106 MMOL/L (ref 100–108)
CO2 SERPL-SCNC: 22 MMOL/L (ref 21–32)
CREAT SERPL-MCNC: 2.31 MG/DL (ref 0.6–1.3)
CRP SERPL-MCNC: 13.2 MG/DL (ref 0–0.3)
DIAGNOSIS, 93000: NORMAL
DIFFERENTIAL METHOD BLD: ABNORMAL
EOSINOPHIL # BLD: 0.1 K/UL (ref 0–0.4)
EOSINOPHIL NFR BLD: 1 % (ref 0–5)
ERYTHROCYTE [DISTWIDTH] IN BLOOD BY AUTOMATED COUNT: 16.1 % (ref 11.6–14.5)
GLOBULIN SER CALC-MCNC: 3.3 G/DL (ref 2–4)
GLUCOSE BLD STRIP.AUTO-MCNC: 119 MG/DL (ref 70–110)
GLUCOSE BLD STRIP.AUTO-MCNC: 180 MG/DL (ref 70–110)
GLUCOSE BLD STRIP.AUTO-MCNC: 333 MG/DL (ref 70–110)
GLUCOSE BLD STRIP.AUTO-MCNC: 71 MG/DL (ref 70–110)
GLUCOSE SERPL-MCNC: 117 MG/DL (ref 74–99)
HCT VFR BLD AUTO: 35.6 % (ref 36–48)
HGB BLD-MCNC: 11.9 G/DL (ref 13–16)
INR PPP: 1.5 (ref 0.8–1.2)
LACTATE SERPL-SCNC: 1 MMOL/L (ref 0.4–2)
LACTATE SERPL-SCNC: 1.5 MMOL/L (ref 0.4–2)
LYMPHOCYTES # BLD AUTO: 11 % (ref 21–52)
LYMPHOCYTES # BLD: 1.5 K/UL (ref 0.9–3.6)
MAGNESIUM SERPL-MCNC: 1.2 MG/DL (ref 1.6–2.6)
MAGNESIUM SERPL-MCNC: 2.1 MG/DL (ref 1.6–2.6)
MCH RBC QN AUTO: 27.7 PG (ref 24–34)
MCHC RBC AUTO-ENTMCNC: 33.4 G/DL (ref 31–37)
MCV RBC AUTO: 82.8 FL (ref 74–97)
MONOCYTES # BLD: 1.8 K/UL (ref 0.05–1.2)
MONOCYTES NFR BLD AUTO: 13 % (ref 3–10)
NEUTS SEG # BLD: 10.3 K/UL (ref 1.8–8)
NEUTS SEG NFR BLD AUTO: 75 % (ref 40–73)
P-R INTERVAL, ECG05: 124 MS
PHOSPHATE SERPL-MCNC: 2.4 MG/DL (ref 2.5–4.9)
PLATELET # BLD AUTO: 172 K/UL (ref 135–420)
PMV BLD AUTO: 10 FL (ref 9.2–11.8)
POTASSIUM SERPL-SCNC: 3.5 MMOL/L (ref 3.5–5.5)
POTASSIUM SERPL-SCNC: 4.2 MMOL/L (ref 3.5–5.5)
PROT SERPL-MCNC: 5.5 G/DL (ref 6.4–8.2)
PROTHROMBIN TIME: 17.7 SEC (ref 11.5–15.2)
Q-T INTERVAL, ECG07: 380 MS
QRS DURATION, ECG06: 146 MS
QTC CALCULATION (BEZET), ECG08: 516 MS
RBC # BLD AUTO: 4.3 M/UL (ref 4.7–5.5)
SODIUM SERPL-SCNC: 138 MMOL/L (ref 136–145)
VENTRICULAR RATE, ECG03: 111 BPM
WBC # BLD AUTO: 13.7 K/UL (ref 4.6–13.2)

## 2017-06-26 PROCEDURE — 85610 PROTHROMBIN TIME: CPT | Performed by: FAMILY MEDICINE

## 2017-06-26 PROCEDURE — 86140 C-REACTIVE PROTEIN: CPT | Performed by: FAMILY MEDICINE

## 2017-06-26 PROCEDURE — 82962 GLUCOSE BLOOD TEST: CPT

## 2017-06-26 PROCEDURE — 74011250636 HC RX REV CODE- 250/636: Performed by: HOSPITALIST

## 2017-06-26 PROCEDURE — 85025 COMPLETE CBC W/AUTO DIFF WBC: CPT | Performed by: FAMILY MEDICINE

## 2017-06-26 PROCEDURE — 74011250636 HC RX REV CODE- 250/636: Performed by: EMERGENCY MEDICINE

## 2017-06-26 PROCEDURE — 82330 ASSAY OF CALCIUM: CPT | Performed by: HOSPITALIST

## 2017-06-26 PROCEDURE — 83605 ASSAY OF LACTIC ACID: CPT | Performed by: FAMILY MEDICINE

## 2017-06-26 PROCEDURE — 83735 ASSAY OF MAGNESIUM: CPT | Performed by: FAMILY MEDICINE

## 2017-06-26 PROCEDURE — 74011000258 HC RX REV CODE- 258: Performed by: EMERGENCY MEDICINE

## 2017-06-26 PROCEDURE — 74011250637 HC RX REV CODE- 250/637: Performed by: HOSPITALIST

## 2017-06-26 PROCEDURE — 36415 COLL VENOUS BLD VENIPUNCTURE: CPT | Performed by: FAMILY MEDICINE

## 2017-06-26 PROCEDURE — 36591 DRAW BLOOD OFF VENOUS DEVICE: CPT

## 2017-06-26 PROCEDURE — 74011000258 HC RX REV CODE- 258: Performed by: HOSPITALIST

## 2017-06-26 PROCEDURE — 74011636637 HC RX REV CODE- 636/637: Performed by: FAMILY MEDICINE

## 2017-06-26 PROCEDURE — 65660000000 HC RM CCU STEPDOWN

## 2017-06-26 PROCEDURE — 74011250636 HC RX REV CODE- 250/636: Performed by: FAMILY MEDICINE

## 2017-06-26 PROCEDURE — 74011250637 HC RX REV CODE- 250/637: Performed by: FAMILY MEDICINE

## 2017-06-26 PROCEDURE — 74011636637 HC RX REV CODE- 636/637: Performed by: HOSPITALIST

## 2017-06-26 PROCEDURE — 84132 ASSAY OF SERUM POTASSIUM: CPT | Performed by: FAMILY MEDICINE

## 2017-06-26 PROCEDURE — 83735 ASSAY OF MAGNESIUM: CPT | Performed by: HOSPITALIST

## 2017-06-26 PROCEDURE — 84100 ASSAY OF PHOSPHORUS: CPT | Performed by: HOSPITALIST

## 2017-06-26 RX ORDER — POTASSIUM CHLORIDE 20 MEQ/1
40 TABLET, EXTENDED RELEASE ORAL ONCE
Status: COMPLETED | OUTPATIENT
Start: 2017-06-26 | End: 2017-06-26

## 2017-06-26 RX ORDER — DOCUSATE SODIUM 100 MG/1
100 CAPSULE, LIQUID FILLED ORAL 2 TIMES DAILY
Status: DISCONTINUED | OUTPATIENT
Start: 2017-06-26 | End: 2017-07-01 | Stop reason: HOSPADM

## 2017-06-26 RX ORDER — OXYCODONE AND ACETAMINOPHEN 5; 325 MG/1; MG/1
1 TABLET ORAL
Status: DISCONTINUED | OUTPATIENT
Start: 2017-06-26 | End: 2017-07-01 | Stop reason: HOSPADM

## 2017-06-26 RX ORDER — ONDANSETRON 2 MG/ML
4 INJECTION INTRAMUSCULAR; INTRAVENOUS
Status: DISCONTINUED | OUTPATIENT
Start: 2017-06-26 | End: 2017-07-01 | Stop reason: HOSPADM

## 2017-06-26 RX ORDER — PANTOPRAZOLE SODIUM 40 MG/1
40 GRANULE, DELAYED RELEASE ORAL
Status: DISCONTINUED | OUTPATIENT
Start: 2017-06-27 | End: 2017-06-29

## 2017-06-26 RX ORDER — PANTOPRAZOLE SODIUM 40 MG/1
40 TABLET, DELAYED RELEASE ORAL DAILY
COMMUNITY
End: 2017-09-21

## 2017-06-26 RX ORDER — HEPARIN SODIUM 5000 [USP'U]/ML
5000 INJECTION, SOLUTION INTRAVENOUS; SUBCUTANEOUS EVERY 8 HOURS
Status: DISCONTINUED | OUTPATIENT
Start: 2017-06-26 | End: 2017-07-01 | Stop reason: HOSPADM

## 2017-06-26 RX ORDER — HEPARIN 100 UNIT/ML
500 SYRINGE INTRAVENOUS DAILY
Status: DISCONTINUED | OUTPATIENT
Start: 2017-06-27 | End: 2017-07-01 | Stop reason: HOSPADM

## 2017-06-26 RX ORDER — INSULIN LISPRO 100 [IU]/ML
INJECTION, SOLUTION INTRAVENOUS; SUBCUTANEOUS
Status: DISCONTINUED | OUTPATIENT
Start: 2017-06-26 | End: 2017-07-01 | Stop reason: HOSPADM

## 2017-06-26 RX ORDER — INSULIN LISPRO 100 [IU]/ML
INJECTION, SOLUTION INTRAVENOUS; SUBCUTANEOUS
Status: DISCONTINUED | OUTPATIENT
Start: 2017-06-26 | End: 2017-06-26

## 2017-06-26 RX ORDER — ATORVASTATIN CALCIUM 10 MG/1
20 TABLET, FILM COATED ORAL DAILY
Status: DISCONTINUED | OUTPATIENT
Start: 2017-06-26 | End: 2017-06-26

## 2017-06-26 RX ORDER — MAGNESIUM SULFATE 100 %
16 CRYSTALS MISCELLANEOUS AS NEEDED
Status: DISCONTINUED | OUTPATIENT
Start: 2017-06-26 | End: 2017-07-01 | Stop reason: HOSPADM

## 2017-06-26 RX ORDER — MAGNESIUM SULFATE HEPTAHYDRATE 40 MG/ML
2 INJECTION, SOLUTION INTRAVENOUS ONCE
Status: COMPLETED | OUTPATIENT
Start: 2017-06-26 | End: 2017-06-29

## 2017-06-26 RX ORDER — ATORVASTATIN CALCIUM 20 MG/1
20 TABLET, FILM COATED ORAL
Status: DISCONTINUED | OUTPATIENT
Start: 2017-06-26 | End: 2017-07-01 | Stop reason: HOSPADM

## 2017-06-26 RX ORDER — SODIUM CHLORIDE AND POTASSIUM CHLORIDE .9; .15 G/100ML; G/100ML
SOLUTION INTRAVENOUS CONTINUOUS
Status: DISCONTINUED | OUTPATIENT
Start: 2017-06-26 | End: 2017-07-01

## 2017-06-26 RX ORDER — DIPHENHYDRAMINE HCL 25 MG
25 CAPSULE ORAL
Status: COMPLETED | OUTPATIENT
Start: 2017-06-26 | End: 2017-06-26

## 2017-06-26 RX ORDER — AMLODIPINE BESYLATE 5 MG/1
5 TABLET ORAL DAILY
Status: DISCONTINUED | OUTPATIENT
Start: 2017-06-27 | End: 2017-07-01 | Stop reason: HOSPADM

## 2017-06-26 RX ORDER — CHOLECALCIFEROL TAB 125 MCG (5000 UNIT) 125 MCG
5000 TAB ORAL DAILY
COMMUNITY

## 2017-06-26 RX ORDER — AMLODIPINE BESYLATE 10 MG/1
10 TABLET ORAL DAILY
Status: DISCONTINUED | OUTPATIENT
Start: 2017-06-26 | End: 2017-06-26

## 2017-06-26 RX ORDER — DEXTROSE MONOHYDRATE 25 G/50ML
25-50 INJECTION, SOLUTION INTRAVENOUS AS NEEDED
Status: DISCONTINUED | OUTPATIENT
Start: 2017-06-26 | End: 2017-07-01 | Stop reason: HOSPADM

## 2017-06-26 RX ORDER — GABAPENTIN 400 MG/1
400-800 CAPSULE ORAL
Status: DISCONTINUED | OUTPATIENT
Start: 2017-06-26 | End: 2017-07-01 | Stop reason: HOSPADM

## 2017-06-26 RX ADMIN — PIPERACILLIN SODIUM,TAZOBACTAM SODIUM 4.5 G: 4; .5 INJECTION, POWDER, FOR SOLUTION INTRAVENOUS at 20:29

## 2017-06-26 RX ADMIN — OXYCODONE HYDROCHLORIDE AND ACETAMINOPHEN 1 TABLET: 5; 325 TABLET ORAL at 16:54

## 2017-06-26 RX ADMIN — HEPARIN SODIUM 5000 UNITS: 5000 INJECTION, SOLUTION INTRAVENOUS; SUBCUTANEOUS at 09:50

## 2017-06-26 RX ADMIN — PIPERACILLIN SODIUM,TAZOBACTAM SODIUM 4.5 G: 4; .5 INJECTION, POWDER, FOR SOLUTION INTRAVENOUS at 07:45

## 2017-06-26 RX ADMIN — OXYCODONE HYDROCHLORIDE AND ACETAMINOPHEN 1 TABLET: 5; 325 TABLET ORAL at 05:37

## 2017-06-26 RX ADMIN — SODIUM CHLORIDE 1000 MG: 900 INJECTION, SOLUTION INTRAVENOUS at 01:43

## 2017-06-26 RX ADMIN — POTASSIUM CHLORIDE 40 MEQ: 1500 TABLET, FILM COATED, EXTENDED RELEASE ORAL at 11:09

## 2017-06-26 RX ADMIN — PIPERACILLIN SODIUM,TAZOBACTAM SODIUM 4.5 G: 4; .5 INJECTION, POWDER, FOR SOLUTION INTRAVENOUS at 11:32

## 2017-06-26 RX ADMIN — HEPARIN SODIUM 5000 UNITS: 5000 INJECTION, SOLUTION INTRAVENOUS; SUBCUTANEOUS at 01:31

## 2017-06-26 RX ADMIN — POTASSIUM CHLORIDE AND SODIUM CHLORIDE: 900; 150 INJECTION, SOLUTION INTRAVENOUS at 01:38

## 2017-06-26 RX ADMIN — PIPERACILLIN SODIUM,TAZOBACTAM SODIUM 4.5 G: 4; .5 INJECTION, POWDER, FOR SOLUTION INTRAVENOUS at 01:31

## 2017-06-26 RX ADMIN — MAGNESIUM SULFATE HEPTAHYDRATE 2 G: 40 INJECTION, SOLUTION INTRAVENOUS at 05:35

## 2017-06-26 RX ADMIN — OXYCODONE HYDROCHLORIDE AND ACETAMINOPHEN 1 TABLET: 5; 325 TABLET ORAL at 11:24

## 2017-06-26 RX ADMIN — DOCUSATE SODIUM 100 MG: 100 CAPSULE, LIQUID FILLED ORAL at 17:09

## 2017-06-26 RX ADMIN — OXYCODONE HYDROCHLORIDE AND ACETAMINOPHEN 1 TABLET: 5; 325 TABLET ORAL at 01:38

## 2017-06-26 RX ADMIN — HEPARIN SODIUM 5000 UNITS: 5000 INJECTION, SOLUTION INTRAVENOUS; SUBCUTANEOUS at 16:56

## 2017-06-26 RX ADMIN — AMLODIPINE BESYLATE 10 MG: 5 TABLET ORAL at 09:50

## 2017-06-26 RX ADMIN — DOCUSATE SODIUM 100 MG: 100 CAPSULE, LIQUID FILLED ORAL at 09:50

## 2017-06-26 RX ADMIN — ATORVASTATIN CALCIUM 20 MG: 20 TABLET, FILM COATED ORAL at 22:11

## 2017-06-26 RX ADMIN — INSULIN DETEMIR 65 UNITS: 100 INJECTION, SOLUTION SUBCUTANEOUS at 22:10

## 2017-06-26 RX ADMIN — GABAPENTIN 800 MG: 400 CAPSULE ORAL at 10:33

## 2017-06-26 RX ADMIN — DIPHENHYDRAMINE HYDROCHLORIDE 25 MG: 25 CAPSULE ORAL at 02:11

## 2017-06-26 RX ADMIN — INSULIN LISPRO 8 UNITS: 100 INJECTION, SOLUTION INTRAVENOUS; SUBCUTANEOUS at 22:10

## 2017-06-26 RX ADMIN — OXYCODONE HYDROCHLORIDE AND ACETAMINOPHEN 1 TABLET: 5; 325 TABLET ORAL at 22:11

## 2017-06-26 NOTE — ACP (ADVANCE CARE PLANNING)
Patient has designated __his spouse______________________ to participate in his/her discharge plan and to receive any needed information. Name: Wendi Hughes  Address:  [address] [place].  [address]  Phone number:  [phone]

## 2017-06-26 NOTE — CONSULTS
Infectious Disease Consultation Note    Requested by: Dr. Elner Nageotte    Reason: fever, colon CA s/p chemotherapy    Current abx Prior abx   Vancomycin, Zosyn 6/25 - 1 Levofloxacin 6/25 - 1      ASSESSMENT - > REC:     Fever  - unclear source. - with rigors during IV abx infusion must consider mediport infection (but no h/o fevers, chills sweats PTA until day PTA)  - w/ ?worsening back pain would consider Lumbar discitis / osteomyelitis but doubt  - doubt constipation x 1 week caused fever in this case (but occasionally can cause low grade fever) -> continue empiric Vancomycin, zosyn  -> dc Levofloxacin  -> monitor blood cultures 6/25 - If NG by tomorrow dc abx. If + may need removal of mediport  -> MRI lumbar spine r/o discitis / OM when renal function improves   MYRON  - from N/V, poor intake  - Cr 2.6 on 6/25 (0.9 on 6/22) -> per IM   stage III adenoCa colon   - s/p left hemicolectomy September 2018  - chemotherapy x 8 cycles, last May 30    DM    HTN    Sleep apnea      MICROBIOLOGY:   6/25 blcx IP x 2   urcx      LINES AND CATHETERS:   mediport  piv      HPI:    50year-old  male with DM, HTN, Sleep apnea,  stage III adenoCa colon s/p left hemicolectomy, chemotherapy admitted to [facility] 6/25 due to fever. He had the colon resection in September 2016 at [address] [place] [address] in [place] and has been receiving chemotherapy there since. He tolerated chemotherapy fairly well until his last of eight cycles given on May 30 (3 weeks PTA). About one week later he began having abdominal cramping and some back pain. He has old lower back injuries that intermittently cause back pain and it was this area that appeared to be acting up. The nausea and abdominal cramping persisted and he had several episodes of vomiting. He had no bowel movement for around a week. The back pain appeared to be worsening and he presented to the [facility] ED 3 days PTA.  He was afebrile with normal WBC 8.6 and abdominal film showed no evidence of obstruction and a moderate amount of retained stool throughout the colon. He improved with IV fluids or oral analgesics and was sent home. He was taking laxatives and finally had a large BM one day PTA. A short time later his wife thought he looked ill and found him to have a fever and took him to the ED on the instructions of his oncologist.    He was found to have a temperature of 101.1 and pulse of 117. CXR showed no pneumonia and Urinalysis showed 11 -20 WBC. He was started empirically on Vancomycin, Zosyn and Levofloxacin and admitted. During the infusion of IV antibiotics he experience uncontrollable shaking chills. Blood cultures have been taken and are pending. Back pain has been controlled with Dilaudid    Past Medical History:   Diagnosis Date    [facility] ([facility] [address]     Diabetes ([facility] [address] [facility]     Hypercholesteremia     Hypertension     Sleep apnea     C-PAP       Past Surgical History:   Procedure Laterality Date    COLONOSCOPY N/A 8/11/2016    COLONOSCOPY performed by Julio Oneil MD at [facility] ENDOSCOPY    HX APPENDECTOMY      HX TONSILLECTOMY         Allergies: Review of patient's allergies indicates no known allergies.  .    Current Facility-Administered Medications   Medication Dose Route Frequency    0.9% sodium chloride with KCl 20 mEq/L infusion   IntraVENous CONTINUOUS    heparin (porcine) injection 5,000 Units  5,000 Units SubCUTAneous Q8H    amLODIPine (NORVASC) tablet 10 mg  10 mg Oral DAILY    docusate sodium (COLACE) capsule 100 mg  100 mg Oral BID    oxyCODONE-acetaminophen (PERCOCET) 5-325 mg per tablet 1 Tab  1 Tab Oral Q4H PRN    vancomycin (VANCOCIN) 1,500 mg in 0.9% sodium chloride 500 mL IVPB  1,500 mg IntraVENous Q24H    piperacillin-tazobactam (ZOSYN) 4.5 g in 0.9% sodium chloride (MBP/ADV) 100 mL MBP ### EXTENDED 4 HOUR INFUSION ###   4.5 g IntraVENous Q8H    [START ON 6/28/2017] VANCOMYCIN INFORMATION NOTE   Other ONCE    ELECTROLYTE REPLACEMENT PROTOCOL  1 Each Other PRN    atorvastatin (LIPITOR) tablet 20 mg  20 mg Oral QHS    gabapentin (NEURONTIN) capsule 400-800 mg  400-800 mg Oral TID PRN    ELECTROLYTE REPLACEMENT PROTOCOL  1 Each Other PRN    ELECTROLYTE REPLACEMENT PROTOCOL  1 Each Other PRN    ELECTROLYTE REPLACEMENT PROTOCOL  1 Each Other PRN    ELECTROLYTE REPLACEMENT PROTOCOL  1 Each Other PRN    [START ON 2017] heparin (porcine) pf 500 Units  500 Units InterCATHeter DAILY    sodium chloride (NS) flush 5-10 mL  5-10 mL IntraVENous PRN    [START ON 2017] levoFLOXacin (LEVAQUIN) 750 mg in D5W IVPB  750 mg IntraVENous Q48H    insulin detemir (LEVEMIR) injection 65 Units  65 Units SubCUTAneous QHS       History reviewed. No pertinent family history. Social History     Social History    Marital status:      Spouse name: N/A    Number of children: N/A    Years of education: N/A     Occupational History    Not on file. Social History Main Topics    Smoking status: Never Smoker    Smokeless tobacco: Never Used    Alcohol use No    Drug use: No    Sexual activity: Not on file     Other Topics Concern    Not on file     Social History Narrative     History   Smoking Status    Never Smoker   Smokeless Tobacco    Never Used        Temp (24hrs), Av.8 °F (37.7 °C), Min:99 °F (37.2 °C), Max:101.1 °F (38.4 °C)    Visit Vitals    /84    Pulse (!) 111    Temp 99.3 °F (37.4 °C)    Resp 17    Ht 5' 9\" (1.753 m)    Wt 89.9 kg (198 lb 3.1 oz)    SpO2 99%    BMI 29.27 kg/m2       ROS:A comprehensive review of systems was negative except for that written in the History of Present Illness. Physical Exam:    General: Well developed, well nourished 50 y.o.  male in no acute distress.   ENT: ENT exam normal, no neck nodes or sinus tenderness  Head: normocephalic, without obvious abnormality  Mouth:  mucous membranes moist, pharynx normal without lesions  Neck: supple, symmetrical, trachea midline   Cardio:  regular rate and rhythm, S1, S2 normal, no murmur, click, rub or gallop  Chest: inspection normal - no chest wall deformities or tenderness, respiratory effort normal  Lungs: clear to auscultation, no wheezes or rales and unlabored breathing  Abdomen: soft, non-tender. Bowel sounds normal. No masses, no organomegaly. , well-healed abdominal surgical wound  Extremities:  no redness or tenderness in the calves or thighs, no edema  Neuro: Grossly normal      Labs: Results:   Chemistry Recent Labs      06/26/17 0415 06/25/17 1950   GLU  117*  148*   NA  138  131*   K  3.5  3.0*   CL  106  95*   CO2  22  25   BUN  23*  25*   CREA  2.31*  2.59*   CA  6.6*  8.4*   AGAP  10  11   BUCR  10*  10*   AP  195*  313*   TP  5.5*  8.6*   ALB  2.2*  3.5   GLOB  3.3  5.1*   AGRAT  0.7*  0.7*      CBC w/Diff Recent Labs      06/26/17 0415 06/25/17 1950   WBC  13.7*  15.3*   RBC  4.30*  4.59*   HGB  11.9*  12.9*   HCT  35.6*  37.6   PLT  172  195   GRANS  75*  76*   LYMPH  11*  11*   EOS  1  1      Microbiology Recent Labs      06/25/17 2005 06/25/17   1950   CULT  NO GROWTH AFTER 13 HOURS  NO GROWTH AFTER 13 Zoë Adams M.D.  [facility]   [phone]

## 2017-06-26 NOTE — PROGRESS NOTES
Physical Exam   Skin:        Primary Nurse Dolly Yeboah RN and Eagle De La O RN performed a dual skin assessment on this patient No impairment noted

## 2017-06-26 NOTE — ROUTINE PROCESS
TRANSFER - OUT REPORT:    Verbal report given to Vamsi Chu (name) on Mertie Em  being transferred to [address] [place] [address] (unit) for routine progression of care       Report consisted of patients Situation, Background, Assessment and   Recommendations(SBAR). Information from the following report(s) SBAR, Kardex, Procedure Summary, Intake/Output and MAR was reviewed with the receiving nurse. Lines:   Venous Access Device 06/25/17 Upper chest (subclavicular area), left (Active)   Central Line Being Utilized No 6/26/2017 12:00 PM   Criteria for Appropriate Use Long term IV/antibiotic administration 6/26/2017 12:00 PM   Site Assessment Clean, dry, & intact 6/26/2017 12:00 PM   Date of Last Dressing Change 06/25/17 6/26/2017 12:00 PM   Dressing Status Clean, dry, & intact 6/26/2017 12:00 PM   Dressing Type Disk with Chlorhexadine gluconate (CHG); Transparent 6/26/2017 12:00 PM   Action Taken Open ports on tubing capped 6/26/2017 12:00 PM   Positive Blood Return (Medial Site) Yes 6/26/2017  4:00 AM   Alcohol Cap Used Yes 6/26/2017 12:00 PM       Peripheral IV 06/25/17 Right Antecubital (Active)   Site Assessment Clean, dry, & intact 6/26/2017 12:00 PM   Phlebitis Assessment 0 6/26/2017 12:00 PM   Infiltration Assessment 0 6/26/2017 12:00 PM   Dressing Status Clean, dry, & intact 6/26/2017 12:00 PM   Dressing Type Tape;Transparent 6/26/2017 12:00 PM   Hub Color/Line Status Green; Infusing 6/26/2017 12:00 PM   Action Taken Open ports on tubing capped 6/26/2017  8:00 AM   Alcohol Cap Used Yes 6/26/2017 12:00 PM        Opportunity for questions and clarification was provided.       Patient transported with:   Monitor  Patient-specific medications from Pharmacy  Registered Nurse

## 2017-06-26 NOTE — DIABETES MGMT
Diabetes Patient/Family Education Record    Factors That  May Influence Patients Ability  to Learn or  Comply With  Recommendations:    []   Language barrier    []   Cultural needs   []   Motivation    []   Cognitive limitation    []   Physical   []   Education    []   Physiological factors   []   Hearing/vision/speaking impairment   []   Orthodox beliefs    []   Financial factors   []  Other:   [x]  No factors identified at this time.      Person Instructed:   [x]   Patient   []   Family   []  Other     Preference for Learning:   [x]   Verbal   []   Written   []  Demonstration     Level of Comprehension & Competence:    [x]  Good                                      [] Fair                                     []  Poor                             []  Needs Reinforcement   [x]  Teachback completed    Education Component:   [x]  Medication management, and potential medication interactions    []  Nutritional management including the role of carbohydrate intake- liberal diet with chemotherapy   []  Exercise   [x]  Signs, symptoms, and treatment of hyperglycemia and hypoglycemia   [x] Treatment of hyperglycemia and hypoglycemia   []  Importance of blood glucose monitoring and how to obtain a blood glucose meter    []  Instruction on use of blood glucose meter   [x]  Discuss the importance of HbA1C monitoring and provide patient with  results   []  Sick day guidelines   []  Proper use and disposal of lancets, needles, syringes or insulin pens (if appropriate)   []  Potential long-term complications (retinopathy, kidney disease, neuropathy, heart disease, stroke, vascular disease, foot care)   [] Provide emergency contact number and contact number for more information    [x]  Goal:  Patient/family will demonstrate understanding of Diabetes Self Management Skills by: (date) __7/1_____  Plan for post-discharge education or self-management support:    [x] Outpatient class schedule provided            [] Patient Declined    [] Scheduled for outpatient classes (date) _______

## 2017-06-26 NOTE — ED NOTES
Patient is Colon CA patient undergoing treatment.  Patient started running a fever and was told to come to the ER

## 2017-06-26 NOTE — ED NOTES
Patient received 2585 ml of 0.9% NS bolus, 100 ml IV from Zosyn and 150 ml from Levaquin.  Patient had 250 ml output of urine

## 2017-06-26 NOTE — DIABETES MGMT
NUTRITIONAL ASSESSMENT GLYCEMIC CONTROL/ PLAN OF CARE     Jessi Chambers           50 y.o.           6/25/2017                 1. MYRON (acute kidney injury) ([facility] [address]    2. Sepsis, due to unspecified organism ([facility] [address]    3. History of colon cancer    4. Acute UTI    DM    ASSESSMENT:   Nutritional Status:  Pt is overweight related to excess caloric intake as evidenced by 124% ideal weight and BMI= 29.3kg/m2. Nutrient deficit as evidenced by 20%  intake of the breakfast meal due to poor appetite . Pt has been receiving chemotherapy pta. Diabetes Management:   Recent blood glucose:      Lab Results   Component Value Date/Time     (H) 06/26/2017 04:15 AM     (H) 06/25/2017 07:50 PM    GLUCPOC 71 06/26/2017 09:46 AM     Within target range (non-ICU: <140; ICU<180): [x] Yes   []  No  Current Insulin regimen: Levemir 65 units /24 hrs plus corrective lispro  Home medication/insulin regimen:  Levemir and Novolg- dc'd invokana due to cost PCP is aware  HbA1c:7.6%  Adequate glycemic control PTA:  [x] Yes  [] No     SUBJECTIVE/OBJECTIVE:   Information obtained from:Pt    Diet: Diabetic    No data found.   Medications: [x]                Reviewed     Most Recent POC Glucose:   Recent Labs      06/26/17   0415  06/25/17   1950   GLU  117*  148*       Labs:   Lab Results   Component Value Date/Time    Hemoglobin A1c 7.6 08/08/2016 01:10 PM   equivalent  to ave Blood Glucose of 168mg/dl for 2-3 months prior to admission    Lab Results   Component Value Date/Time    Sodium 138 06/26/2017 04:15 AM    Potassium 3.5 06/26/2017 04:15 AM    Chloride 106 06/26/2017 04:15 AM    CO2 22 06/26/2017 04:15 AM    Anion gap 10 06/26/2017 04:15 AM    Glucose 117 06/26/2017 04:15 AM    BUN 23 06/26/2017 04:15 AM    Creatinine 2.31 06/26/2017 04:15 AM    Calcium 6.6 06/26/2017 04:15 AM    Magnesium 1.2 06/26/2017 12:35 AM          Albumin 2.2 06/26/2017 04:15 AM     Anthropometrics: IBW : 72.6 kg (160 lb), % IBW (Calculated): 123.87 %, BMI (calculated): 29.3  Wt Readings from Last 1 Encounters:   06/26/17 89.9 kg (198 lb 3.1 oz)      Ht Readings from Last 1 Encounters:   06/26/17 5' 9\" (1.753 m)       Estimated Nutrition Needs:  2250 Kcals/day, Protein (g): 90 g Fluid (ml): 2400 ml  Based on:   []          Actual BW    []          ABW   []            Adjusted BW      Nutrition Diagnoses:      As stated above. Nutrition Interventions: implement meal preferences    Encouraged increased intake at meals to meet calorie and protein requirements. Goal:   Blood glucose will be within target range of  mg/dL by 6/29. Intake will meet >75% of energy and protein requirements. Wt maintenance d/t chemotherapy.       Nutrition Monitoring and Evaluation      [x]     Monitor po intake on meal rounds  []     Continue inpatient monitoring and intervention  []     Other:  Nutrition Risk:  []   High     [x]  Moderate    []  Minimal/Uncompromised    Earle Butts RD

## 2017-06-26 NOTE — PROGRESS NOTES
[facility] Dosing Services     Pharmacy dosing ABX empirically for treatment sepsis of unknown etiology      Day of Therapy: 0    Labs:   Bun/Serum Creatinine - 17 mg/dl / 0.93 mg/dl (lab from 6/22/17, today's pending)  CrCl - 103.4 ml/min    Cultures:   Pending     Plan:   Vancomycin:   Goal trough 15-20. Loading dose: 2 gm IV x1. Continue with 1 gm iv q8h. Check Vanco-trough on 6/27. Zosyn:  Continue 4.5 gm IV q6h. Will convert to EIPT once on floor. Levaquin:  Continue 750 mg IV q24h. Pharmacy to follow and will make changes to dose and/or frequency based on clinical status. Thanks for the consult.

## 2017-06-26 NOTE — H&P
History and Physical    Patient: Yinka Pierre               Sex: male          DOA: 6/25/2017       YOB: 1965      Age:  50 y.o.        LOS:  LOS: 0 days        Chief Complaint   Patient presents with    Fever         HPI:     Yinka Pierre is a 50 y.o. male with several medical problems including colon cancer on chemotherapy with [facility] who presents with c/o fever. Patient reports he had a T 102.5 at home 5 pm today. He denies chest pain ,sob, cough, abdominal pain, dysuria , nausea, vomiting, diarrhea. He is scheduled for CT abdomen, chest, pelvis tomorrow in [place]. His oncologist is Dr Lou Ramires. In the ED he was febrile T 101.2 and tachycardic. He was started on the sepsis treatment protocol. Patient will be admitted for sepsis. Past Medical History:   Diagnosis Date    Colon cancer ([facility]     Diabetes ([facility]     Hypercholesteremia     Hypertension     Sleep apnea     C-PAP   . Past Surgical History:   Procedure Laterality Date    COLONOSCOPY N/A 8/11/2016    COLONOSCOPY performed by Hailee Jacobson MD at [address] [place] [address] HX APPENDECTOMY      HX TONSILLECTOMY         No current facility-administered medications on file prior to encounter. Current Outpatient Prescriptions on File Prior to Encounter   Medication Sig Dispense Refill    oxyCODONE-acetaminophen (PERCOCET) 5-325 mg per tablet Take 1 Tab by mouth every four (4) hours as needed for Pain. Max Daily Amount: 6 Tabs. 20 Tab 0    docusate sodium (COLACE) 100 mg capsule Take 1 Cap by mouth two (2) times a day for 10 days. 20 Cap 0    promethazine (PHENERGAN) 25 mg suppository Insert 1 Suppository into rectum every six (6) hours as needed for Nausea for up to 7 days. 12 Suppository 0    erythromycin 500 mg tablet Take one tab at 3 PM, 5 PM, and 9 PM the day before surgery.  3 Tab 0    neomycin (MYCIFRADIN) 500 mg tablet Take two tabs at 3 PM, 5 PM, and 9 PM the day before surgery. 6 Tab 0    PEG 3350-Electrolytes (COLYTE) 240-22.72-6.72 -5.84 gram solution Take as directed 4 L 0    canagliflozin (INVOKANA) 300 mg tablet Take 300 mg by mouth Daily (before breakfast).  losartan (COZAAR) 100 mg tablet Take 100 mg by mouth daily.  amLODIPine (NORVASC) 10 mg tablet Take 10 mg by mouth daily.  insulin aspart (NOVOLOG) 100 unit/mL injection by SubCUTAneous route.  insulin detemir (LEVEMIR) 100 unit/mL injection by SubCUTAneous route nightly.  cyclobenzaprine (FLEXERIL) 10 mg tablet       gabapentin (NEURONTIN) 400 mg capsule Take 400 mg by mouth as needed.  atorvastatin (LIPITOR) 10 mg tablet Take 20 mg by mouth daily. Social History     Social History    Marital status:      Spouse name: N/A    Number of children: N/A    Years of education: N/A     Occupational History    Not on file. Social History Main Topics    Smoking status: Never Smoker    Smokeless tobacco: Never Used    Alcohol use No    Drug use: No    Sexual activity: Not on file     Other Topics Concern    Not on file     Social History Narrative       Prior to Admission Medications   Prescriptions Last Dose Informant Patient Reported? Taking? PEG 3350-Electrolytes (COLYTE) 240-22.72-6.72 -5.84 gram solution   No No   Sig: Take as directed   amLODIPine (NORVASC) 10 mg tablet   Yes No   Sig: Take 10 mg by mouth daily. atorvastatin (LIPITOR) 10 mg tablet   Yes No   Sig: Take 20 mg by mouth daily. canagliflozin (INVOKANA) 300 mg tablet   Yes No   Sig: Take 300 mg by mouth Daily (before breakfast). cyclobenzaprine (FLEXERIL) 10 mg tablet   Yes No   docusate sodium (COLACE) 100 mg capsule   No No   Sig: Take 1 Cap by mouth two (2) times a day for 10 days. erythromycin 500 mg tablet   No No   Sig: Take one tab at 3 PM, 5 PM, and 9 PM the day before surgery. gabapentin (NEURONTIN) 400 mg capsule   Yes No   Sig: Take 400 mg by mouth as needed.    insulin aspart (NOVOLOG) 100 unit/mL injection   Yes No   Sig: by SubCUTAneous route. insulin detemir (LEVEMIR) 100 unit/mL injection   Yes No   Sig: by SubCUTAneous route nightly. losartan (COZAAR) 100 mg tablet   Yes No   Sig: Take 100 mg by mouth daily. neomycin (MYCIFRADIN) 500 mg tablet   No No   Sig: Take two tabs at 3 PM, 5 PM, and 9 PM the day before surgery. oxyCODONE-acetaminophen (PERCOCET) 5-325 mg per tablet   No No   Sig: Take 1 Tab by mouth every four (4) hours as needed for Pain. Max Daily Amount: 6 Tabs. promethazine (PHENERGAN) 25 mg suppository   No No   Sig: Insert 1 Suppository into rectum every six (6) hours as needed for Nausea for up to 7 days. Facility-Administered Medications: None       History reviewed. No pertinent family history. Review of Systems   Constitutional: Positive for fever and malaise/fatigue. HENT: Negative. Eyes: Negative. Respiratory: Negative. Gastrointestinal: Negative. Genitourinary: Negative. Musculoskeletal: Positive for back pain (CHRONIC). Skin: Negative. Neurological: Negative. Endo/Heme/Allergies: Negative. Psychiatric/Behavioral: Negative. Physical Exam:       Visit Vitals    /62    Pulse (!) 101    Temp (!) 101.1 °F (38.4 °C)    Resp 15    Wt 86.2 kg (190 lb)    SpO2 96%    BMI 28.06 kg/m2       Physical Exam   Constitutional: He is oriented to person, place, and time. He appears well-developed and well-nourished. He does not appear ill. No distress. HENT:   Head: Normocephalic and atraumatic. Eyes: Conjunctivae and EOM are normal. Pupils are equal, round, and reactive to light. No scleral icterus. Neck: Neck supple. Cardiovascular: Normal rate, regular rhythm and normal heart sounds. No murmur heard. CAPP REFILL < 3 SEC  RT RADIAL PUULSE +2   Pulmonary/Chest: Effort normal and breath sounds normal. No respiratory distress. He has no wheezes. He has no rales. MEDIPORT LEFT CHEST   Abdominal: Soft. Bowel sounds are normal. He exhibits no distension. There is no tenderness. There is no rebound and no guarding. Musculoskeletal: He exhibits no edema. Neurological: He is alert and oriented to person, place, and time. Skin: Skin is warm. No rash noted. He is not diaphoretic. No erythema. No pallor. Psychiatric: He has a normal mood and affect. Ancillary Studies: All lab and imaging reviewed for the past 24 hours. Recent Results (from the past 24 hour(s))   METABOLIC PANEL, COMPREHENSIVE    Collection Time: 06/25/17  7:50 PM   Result Value Ref Range    Sodium 131 (L) 136 - 145 mmol/L    Potassium 3.0 (L) 3.5 - 5.5 mmol/L    Chloride 95 (L) 100 - 108 mmol/L    CO2 25 21 - 32 mmol/L    Anion gap 11 3.0 - 18 mmol/L    Glucose 148 (H) 74 - 99 mg/dL    BUN 25 (H) 7.0 - 18 MG/DL    Creatinine 2.59 (H) 0.6 - 1.3 MG/DL    BUN/Creatinine ratio 10 (L) 12 - 20      GFR est AA 32 (L) >60 ml/min/1.73m2    GFR est non-AA 26 (L) >60 ml/min/1.73m2    Calcium 8.4 (L) 8.5 - 10.1 MG/DL    Bilirubin, total 0.8 0.2 - 1.0 MG/DL    ALT (SGPT) 59 16 - 61 U/L    AST (SGOT) 151 (H) 15 - 37 U/L    Alk. phosphatase 313 (H) 45 - 117 U/L    Protein, total 8.6 (H) 6.4 - 8.2 g/dL    Albumin 3.5 3.4 - 5.0 g/dL    Globulin 5.1 (H) 2.0 - 4.0 g/dL    A-G Ratio 0.7 (L) 0.8 - 1.7     CBC WITH AUTOMATED DIFF    Collection Time: 06/25/17  7:50 PM   Result Value Ref Range    WBC 15.3 (H) 4.6 - 13.2 K/uL    RBC 4.59 (L) 4.70 - 5.50 M/uL    HGB 12.9 (L) 13.0 - 16.0 g/dL    HCT 37.6 36.0 - 48.0 %    MCV 81.9 74.0 - 97.0 FL    MCH 28.1 24.0 - 34.0 PG    MCHC 34.3 31.0 - 37.0 g/dL    RDW 15.7 (H) 11.6 - 14.5 %    PLATELET 679 316 - 220 K/uL    MPV 9.9 9.2 - 11.8 FL    NEUTROPHILS 76 (H) 40 - 73 %    LYMPHOCYTES 11 (L) 21 - 52 %    MONOCYTES 12 (H) 3 - 10 %    EOSINOPHILS 1 0 - 5 %    BASOPHILS 0 0 - 2 %    ABS. NEUTROPHILS 11.7 (H) 1.8 - 8.0 K/UL    ABS. LYMPHOCYTES 1.6 0.9 - 3.6 K/UL    ABS. MONOCYTES 1.9 (H) 0.05 - 1.2 K/UL    ABS. EOSINOPHILS 0.1 0.0 - 0.4 K/UL    ABS.  BASOPHILS 0.0 0.0 - 0.06 K/UL    DF AUTOMATED     MAGNESIUM    Collection Time: 06/25/17  7:50 PM   Result Value Ref Range    Magnesium 1.6 1.6 - 2.6 mg/dL   POC LACTIC ACID    Collection Time: 06/25/17  7:54 PM   Result Value Ref Range    Lactic Acid (POC) 2.5 (HH) 0.4 - 2.0 mmol/L   URINALYSIS W/ RFLX MICROSCOPIC    Collection Time: 06/25/17  8:21 PM   Result Value Ref Range    Color DARK YELLOW      Appearance CLOUDY      Specific gravity 1.024 1.005 - 1.030      pH (UA) 5.0 5.0 - 8.0      Protein 100 (A) NEG mg/dL    Glucose >1000 (A) NEG mg/dL    Ketone TRACE (A) NEG mg/dL    Bilirubin SMALL (A) NEG      Blood NEGATIVE  NEG      Urobilinogen 1.0 0.2 - 1.0 EU/dL    Nitrites NEGATIVE  NEG      Leukocyte Esterase NEGATIVE  NEG     URINE MICROSCOPIC ONLY    Collection Time: 06/25/17  8:21 PM   Result Value Ref Range    WBC 11 to 20 0 - 4 /hpf    RBC 0 to 3 0 - 5 /hpf    Epithelial cells FEW 0 - 5 /lpf    Bacteria 1+ (A) NEG /hpf   EKG, 12 LEAD, INITIAL    Collection Time: 06/25/17  8:27 PM   Result Value Ref Range    Ventricular Rate 111 BPM    Atrial Rate 111 BPM    P-R Interval 124 ms    QRS Duration 146 ms    Q-T Interval 380 ms    QTC Calculation (Bezet) 516 ms    Calculated R Axis -86 degrees    Calculated T Axis 53 degrees    Diagnosis       Sinus tachycardia with premature supraventricular complexes  Right bundle branch block  Left anterior fascicular block  Bifascicular block  Left ventricular hypertrophy with repolarization abnormality  Abnormal ECG  When compared with ECG of 17-AUG-2016 11:11,  premature supraventricular complexes are now present  Right bundle branch block is now present         Assessment/Plan     Principal Problem:    Sepsis ([facility] (6/25/2017)    Active Problems:    Hypokalemia (6/25/2017)      Lactic acidosis (6/25/2017)      Leukocytosis (6/25/2017)      MYRON (acute kidney injury) ([facility] (6/25/2017)        Colon CA on Chemo  Hyponatremia  Elevated AST  DM  HTN  MANNY    PLAN:    Sepsis  No source  Continue antibiotics Vancomycin, Levaquin, Zosyn  Follow urine and blood culture   Monitor BP and urine output  Consult ID in AM     Hypokalemia   Replete    Lactic acidosis   LA 2.5   Trend     Leukocytosis   Monitor CBC     MYRON  Hold nephrotoxic medications  Continue IVF  Repeat CMP    Colon Cancer   S/p hemicolectomy 2016  S/p chemo therapy last chemo 5/31/17    Hyponatremia   Follow CMP  IVF    Elevated AST   Trend    DM   Levemir 65 units HS  SSI    Neuropathy  Neurontin prn    HTN  Hold losartan d/t MYRON  Continue norvasc    DVT Prophylaxis     Full code       Rosendo Regan MD  6/25/2017  9:24 PM

## 2017-06-26 NOTE — PROGRESS NOTES
conducted an initial consultation and Spiritual Assessment for Fiona Butts, who is a 50 y.o.,male. Patients Primary Language is: [place]. According to the patients EMR Bahai Affiliation is: No Shinto. The reason the Patient came to the hospital is:   Patient Active Problem List    Diagnosis Date Noted    Sepsis ([facility] 06/25/2017    Hypokalemia 06/25/2017    Lactic acidosis 06/25/2017    Leukocytosis 06/25/2017    MYRON (acute kidney injury) ([facility] 06/25/2017    Colon carcinoma ([facility] 08/17/2016    Anemia 08/08/2016    GI bleeding 08/08/2016    Diabetes ([facility] 08/08/2016    Hypertension 08/08/2016    Sleep apnea 08/08/2016    Other and unspecified noninfectious gastroenteritis and colitis 08/17/2013    Renal insufficiency 08/17/2013    Hyperglycemia 08/17/2013    DM (diabetes mellitus) ([facility] 08/17/2013    Low magnesium levels 08/17/2013    Nausea with vomiting 08/16/2013    Rash 08/16/2013        The  provided the following Interventions:  Initiated a relationship of care and support with patient in room 2805 today at around 0930. Listened empathically as he talked about being here when he was supposed to be at another place getting results from trest done there for his cancer treatment. Needless to say patient seems  A bit disappointed to be here. Provided information about Spiritual Care Services and of our continued presence here for him should he have need. Offered prayer and assurance of continued prayers on patients behalf. The following outcomes were achieved:  Patient shared limited information about his medical narrative. .  Patient processed feeling about current hospitalization. Patient expressed gratitude for pastoral care visit. Assessment:  Patient does not have any Mormonism/cultural needs that will affect patients preferences in health care.   There are no further spiritual or Mormonism issues which require Spiritual Care Services interventions at this time. Plan:  Chaplains will continue to follow and will provide pastoral care on an as needed/requested basis    . Deepti Henry   Spiritual Care   [phone]

## 2017-06-26 NOTE — PROGRESS NOTES
Problem: Sepsis: Day of Diagnosis  Goal: Nutrition/Diet  Outcome: Not Progressing Towards Goal  appeite poor

## 2017-06-26 NOTE — ED NOTES
Patient's Levaquin switched to [facility] IV site. All reactions t[o the medications stopped. Antibiotics are going to be run one at a time to decrease the possibility of a reaction.

## 2017-06-26 NOTE — PROGRESS NOTES
[facility]   Discharge Planning/ Assessment    Reasons for Intervention: Chart reviewed. Met with pt., verified all demographics. States has BC ins. States Dr. Torrie Purvis is his PCP. NOK: Clarisse Noeler, spouse, with whom he lives with & designates can participate in his discharge process. Has cane, uses @ times. Independent with ADL's prior to admit. PLAN: home when medically stable. Will cont to follow for any needs. Yana NunoRN,ext. 2199.      High Risk Criteria  [] Yes  [x]No   Physician Referral  [] Yes  [x]No        Date    Nursing Referral  [] Yes  [x]No        Date    Patient/Family Request  [] Yes  [x]No        Date       Resources:    Medicare  [] Yes  [x]No   Medicaid  [] Yes  [x]No   No Resources  [] Yes  [x]No   Private Insurance  [x] Yes  []No    Name/Phone Number    Other  [] Yes  [x]No        (i.e. Workman's Comp)         Prior Services:    Prior Services  [] Yes  [x]No   Home Health  [] Yes  [x]No   [address]  [] Yes  [x]No        [address] [facility] [address] Program  [] Yes  [x]No       Meals on Wheels  [] Yes  [x]No   Office on Aging  [] Yes  [x]No   Transportation Services  [] Yes  [x]No   Nursing Home  [] Yes  [x]No        Nursing Home Name    [address]  [] Yes  [x]No        [address] Name    Other       Information Source:      Information obtained from  [x] Patient  [] Parent   [] [address]  [] Child  [] Spouse   [] Significant Other/Partner   [] Friend      [] EMS    [] Nursing Home Chart          [] Other:   Chart Review  [x] Yes  []No     Family/Support System:    Patient lives with  [] Alone    [x] Spouse   [] Significant Other  [] Children  [] Caretaker   [] Parent  [] Sibling     [] Other       Other Support System:    Is the patient responsible for care of others  [x] Yes  []No   Information of person caring for patient on  discharge    Managers financial affairs independently  [x] Yes  []No   If no, explain:      Status Prior to Admission:    Mental Status  [x] Awake  [x] Alert  [x] Oriented  [x] Quiet/Calm [] Lethargic/Sedated   [] Disoriented  [] Restless/Anxious  [] Combative   Personal Care  [] Dependent  [x] Independent Personal Care  [] Requires Assistance   Meal Preparation Ability  [x] Independent   [] Standby Assistance   [] Minimal Assistance   [] Moderate Assistance  [] Maximum Assistance     [] Total Assistance   Chores  [x] Independent with Chores   [] N/A Nursing Home Resident   [] Requires Assistance   Bowel/Bladder  [x] Continent  [] Catheter  [] Incontinent  [] Ostomy Self-Care    [] Urine Diversion Self-Care  [] Maximum Assistance     [] Total Assistance   Number of Persons needed for assistance    DME at home  [] Ananda Cullen Stain  [x] Alisia Cullen   [] Commode    [] Bathroom/Grab Bars  [] Hospital Bed  [] Nebulizer  [] Oxygen           [] Raised Toilet Seat  [] Shower Chair  [] Side Rails for Bed   [] Tub Transfer Bench   [] Nicole Deluca  [] Missy Bland      [] Other:   Vendor      Treatment Presently Receiving:    Current Treatments  [] Chemotherapy  [] Dialysis  [] Insulin  [x] IVAB [x] IVF   [] O2  [] PCA   [] PT   [] RT   [] Tube Feedings   [] Wound Care     Psychosocial Evaluation:    Verbalized Knowledge of Disease Process  [] Patient  []Family   Coping with Disease Process  [] Patient  []Family   Requires Further Counseling Coping with Disease Process  [] Patient  []Family     Identified Projected Needs:    Home Health Aid  [] Yes  [x]No   Transportation  [] Yes  [x]No   Education  [] Yes  [x]No        Specific Education     Financial Counseling  [] Yes  [x]No   Inability to Care for Self/Will Require 24 hour care  [] Yes  [x]No   Pain Management  [] Yes  [x]No   Home Infusion Therapy  [] Yes  [x]No   Oxygen Therapy  [] Yes  [x]No   DME  [] Yes  [x]No   Long Term Care Placement  [] Yes  [x]No   Rehab  [] Yes  [x]No   Physical Therapy  [] Yes  [x]No   Needs Anticipated At This Time [] Yes  [x]No     Intra-Hospital Referral:    [address] [facility]  [] Yes  [x]No     [] Yes  [x]No   Patient Representative  [] Yes  [x]No   Staff for Teaching Needs  [] Yes  [x]No   Specialty Teaching Needs     Diabetic Educator  [] Yes  [x]No   Referral for Diabetic Educator Needed  [] Yes  [x]No  If Yes, place order for Nutritionist or Diabetic Consult     Tentative Discharge Plan:    Home with No Services  [x] Yes  []No   Home with [address]  [] Yes  [x]No        If Yes, specify type    Home Care Program  [] Yes  [x]No        If Yes, specify type    Meals on Wheels  [] Yes  [x]No   Office of Aging  [] Yes  [x]No   NHP  [] Yes  [x]No   Return to the Nursing Home  [] Yes  [x]No   Rehab Therapy  [] Yes  [x]No   Acute Rehab  [] Yes  [x]No   Subacute Rehab  [] Yes  [x]No   Private Care  [] Yes  [x]No   Substance Abuse Referral  [] Yes  [x]No   Transportation  [] Yes  [x]No   Chore Service  [] Yes  [x]No   Inpatient Hospice  [] Yes  [x]No   OP RT  [] Yes  [x] No   OP Hemo  [] Yes  [x] No   OP PT  [] Yes  [x]No   Support Group  [] Yes  [x]No   Reach to Recovery  [] Yes  [x]No   OP Oncology Clinic  [] Yes  [x]No   Clinic Appointment  [] Yes  [x]No   DME  [] Yes  [x]No   Comments    Name of D/C Planner or  Given to Patient or Family Dennis Wolf   Phone Number Pager: [phone]        [address]  [phone]   Date 06-   Time    If you are discharged home, whom do you designate to participate in your discharge plan and receive any information needed? Enter name of stuart Kaba        Phone # of stuart [phone]        Address of [address] [place] [address].  [facility] [address]         Updated         Patient refused to designate any           individual

## 2017-06-26 NOTE — PROGRESS NOTES
Consulted with Dr. Rocio Matute regarding the following issues:  1)  Patient with reactions to abx as followed: Levaquin infused through mediport caused patient to shake uncontrollably, resolved by switching to peripheral access. Vancomycin caused reddening of face and feet, stopped for one hour, benadryl administered, infused over four hours with no issue. --ID consult placed. 2) Ok to increase gabapentin to 1-2 caps of 400 mg prn for nerve pain TID  3) Electrolyte protocol to be placed. 4) Information from [address] given to Dr. Rocio Matute with note to update patient's oncologist there. 5) Mediport to be heparin locked and not utilized. 1310: Paged Dr. Rocio Rodriguezr d/t patient requesting home zofran dose--also takes protonix at home and a decreased dose of amlodipine. 1342: Dr. Rocio Rodriguezr paged to 200 so [facility], RN can address the issues listed above.

## 2017-06-26 NOTE — PROGRESS NOTES
Physical Exam   Skin:         Primary Nurse Franchesca Red, RN and Sofie Randhawa RN, RN performed a dual skin assessment on this patient Ally Chambers  Beto score is 18.

## 2017-06-26 NOTE — PROGRESS NOTES
2350 Bedside and Verbal shift change report given to jose willis (oncoming nurse) by James Cates (offgoing nurse). Report included the following information SBAR, Kardex and Recent Results. Pt walked from stretcher to bed. Gait steady. Pt oriented x 3, very pleasant. Lung clear on room air. Pt has medi-port assessed left chest wall , piv right ac with maintance fluids and vanc infusing. Pt rate pain 2/10 acceptable level currently. 0000 admission data base being worked on. Wife in at bedside after hibiclens bed bath given. Explained to pt that his safety is very important to us here at [facility] and to please not get out of bed until doctor comes back around to reassess, and please call for any need. Pt able to demo use call bell. Side rails up x 3. Hob adjusted per pt for his comfort. 0130 dr Marcelino Jeffrey notified that pt face and soles feet bright red, and blanchable , pt has no itching. Also notified that while receiving levaquin thru medi-port began to shake when placed in piv , shaking stopped per report from ed. Given orders to given benadryl and to run vanc at have the ordered time to make infusion last 4 hours instead of 2 hours, Veronica SANDERSON at [facility] notified. Dr Claudia West notified that [facility] at  [facility], would like a call at [address] [place] [address] at [phone] for update. [address] RN CHARGE NURSE notified that DR Mine Harmon would like update at [address] [place] [address]. Nurse will notify next shift and provide phone numbers. 7540 pt resting with eye closed. 1530 [facility] notified of mag 1.2, and c reactive protein 13.2 , orders received for iv mag supplement. 0400 assessment completed. Oral care per elf, turns self. Urinal , call bell. Hand wipes, cup ice water at bedside. 0504 resting with eyes closed, resp unlabored. 1013 pt given cup jello, apple sauce and crackers per request hungry. 0057 Bedside shift change report given to radha willis (oncoming nurse) by jose willis (offgoing nurse).  Report included the following information SBAR, Kardex and Recent Results. Sie rails up x 3. Hob elevated 30 degrees. Call light within reach. Pt working on cell phone.

## 2017-06-26 NOTE — PROGRESS NOTES
[facility] Dosing Services      Pharmacy dosing ABX empirically for treatment sepsis of unknown etiology    Day of Therapy: 1     Labs:   Bun/Serum Creatinine - 23 mg/dl / 2.31 mg/dl   CrCl - 42.4 ml/min  WBC: 13.7  Renal fxn decline    Cultures:   Pending      Plan:   Vancomycin:   Goal trough 15-20. Decrease dose to 1.5 g IV q24h 4 hour infusion due to red man's syndrome. Check Vanco-trough on 6/28. Zosyn:  Will change to 4.5 gm IV q8h extended 4 hour protocol. Levaquin:  Continue 750 mg IV q48h. Pharmacy to follow and will make changes to dose and/or frequency based on clinical status. Thanks for the consult.

## 2017-06-27 LAB
ALBUMIN SERPL BCP-MCNC: 2 G/DL (ref 3.4–5)
ALBUMIN/GLOB SERPL: 0.6 {RATIO} (ref 0.8–1.7)
ALP SERPL-CCNC: 223 U/L (ref 45–117)
ALT SERPL-CCNC: 55 U/L (ref 16–61)
ANION GAP BLD CALC-SCNC: 8 MMOL/L (ref 3–18)
AST SERPL W P-5'-P-CCNC: 98 U/L (ref 15–37)
BACTERIA SPEC CULT: NORMAL
BASOPHILS # BLD AUTO: 0 K/UL (ref 0–0.06)
BASOPHILS # BLD: 0 % (ref 0–2)
BILIRUB SERPL-MCNC: 1 MG/DL (ref 0.2–1)
BUN SERPL-MCNC: 20 MG/DL (ref 7–18)
BUN/CREAT SERPL: 12 (ref 12–20)
CALCIUM SERPL-MCNC: 7 MG/DL (ref 8.5–10.1)
CHLORIDE SERPL-SCNC: 108 MMOL/L (ref 100–108)
CO2 SERPL-SCNC: 23 MMOL/L (ref 21–32)
CREAT SERPL-MCNC: 1.72 MG/DL (ref 0.6–1.3)
DIFFERENTIAL METHOD BLD: ABNORMAL
EOSINOPHIL # BLD: 0.1 K/UL (ref 0–0.4)
EOSINOPHIL NFR BLD: 1 % (ref 0–5)
ERYTHROCYTE [DISTWIDTH] IN BLOOD BY AUTOMATED COUNT: 16.1 % (ref 11.6–14.5)
GLOBULIN SER CALC-MCNC: 3.6 G/DL (ref 2–4)
GLUCOSE BLD STRIP.AUTO-MCNC: 110 MG/DL (ref 70–110)
GLUCOSE BLD STRIP.AUTO-MCNC: 114 MG/DL (ref 70–110)
GLUCOSE BLD STRIP.AUTO-MCNC: 130 MG/DL (ref 70–110)
GLUCOSE BLD STRIP.AUTO-MCNC: 139 MG/DL (ref 70–110)
GLUCOSE SERPL-MCNC: 217 MG/DL (ref 74–99)
HCT VFR BLD AUTO: 28.8 % (ref 36–48)
HGB BLD-MCNC: 9.5 G/DL (ref 13–16)
LYMPHOCYTES # BLD AUTO: 10 % (ref 21–52)
LYMPHOCYTES # BLD: 1 K/UL (ref 0.9–3.6)
MCH RBC QN AUTO: 27.2 PG (ref 24–34)
MCHC RBC AUTO-ENTMCNC: 33 G/DL (ref 31–37)
MCV RBC AUTO: 82.5 FL (ref 74–97)
MONOCYTES # BLD: 1.2 K/UL (ref 0.05–1.2)
MONOCYTES NFR BLD AUTO: 11 % (ref 3–10)
NEUTS SEG # BLD: 8.3 K/UL (ref 1.8–8)
NEUTS SEG NFR BLD AUTO: 78 % (ref 40–73)
PLATELET # BLD AUTO: 120 K/UL (ref 135–420)
PMV BLD AUTO: 10 FL (ref 9.2–11.8)
POTASSIUM SERPL-SCNC: 3.9 MMOL/L (ref 3.5–5.5)
PROT SERPL-MCNC: 5.6 G/DL (ref 6.4–8.2)
RBC # BLD AUTO: 3.49 M/UL (ref 4.7–5.5)
SERVICE CMNT-IMP: NORMAL
SODIUM SERPL-SCNC: 139 MMOL/L (ref 136–145)
WBC # BLD AUTO: 10.6 K/UL (ref 4.6–13.2)

## 2017-06-27 PROCEDURE — 80053 COMPREHEN METABOLIC PANEL: CPT | Performed by: HOSPITALIST

## 2017-06-27 PROCEDURE — 74011250636 HC RX REV CODE- 250/636: Performed by: FAMILY MEDICINE

## 2017-06-27 PROCEDURE — 74011000258 HC RX REV CODE- 258: Performed by: HOSPITALIST

## 2017-06-27 PROCEDURE — 36415 COLL VENOUS BLD VENIPUNCTURE: CPT | Performed by: HOSPITALIST

## 2017-06-27 PROCEDURE — 85025 COMPLETE CBC W/AUTO DIFF WBC: CPT | Performed by: HOSPITALIST

## 2017-06-27 PROCEDURE — 77030011943

## 2017-06-27 PROCEDURE — 74011250637 HC RX REV CODE- 250/637: Performed by: FAMILY MEDICINE

## 2017-06-27 PROCEDURE — 74011250636 HC RX REV CODE- 250/636: Performed by: HOSPITALIST

## 2017-06-27 PROCEDURE — 82962 GLUCOSE BLOOD TEST: CPT

## 2017-06-27 PROCEDURE — 65660000000 HC RM CCU STEPDOWN

## 2017-06-27 PROCEDURE — 74011250637 HC RX REV CODE- 250/637: Performed by: HOSPITALIST

## 2017-06-27 PROCEDURE — 74011636637 HC RX REV CODE- 636/637: Performed by: HOSPITALIST

## 2017-06-27 RX ADMIN — PANTOPRAZOLE SODIUM 40 MG: 40 GRANULE, DELAYED RELEASE ORAL at 10:26

## 2017-06-27 RX ADMIN — OXYCODONE HYDROCHLORIDE AND ACETAMINOPHEN 1 TABLET: 5; 325 TABLET ORAL at 06:05

## 2017-06-27 RX ADMIN — INSULIN DETEMIR 68 UNITS: 100 INJECTION, SOLUTION SUBCUTANEOUS at 23:08

## 2017-06-27 RX ADMIN — SODIUM CHLORIDE 1500 MG: 900 INJECTION, SOLUTION INTRAVENOUS at 01:54

## 2017-06-27 RX ADMIN — OXYCODONE HYDROCHLORIDE AND ACETAMINOPHEN 1 TABLET: 5; 325 TABLET ORAL at 20:24

## 2017-06-27 RX ADMIN — POTASSIUM CHLORIDE AND SODIUM CHLORIDE: 900; 150 INJECTION, SOLUTION INTRAVENOUS at 16:22

## 2017-06-27 RX ADMIN — PIPERACILLIN SODIUM,TAZOBACTAM SODIUM 4.5 G: 4; .5 INJECTION, POWDER, FOR SOLUTION INTRAVENOUS at 12:54

## 2017-06-27 RX ADMIN — ATORVASTATIN CALCIUM 20 MG: 20 TABLET, FILM COATED ORAL at 23:07

## 2017-06-27 RX ADMIN — PIPERACILLIN SODIUM,TAZOBACTAM SODIUM 4.5 G: 4; .5 INJECTION, POWDER, FOR SOLUTION INTRAVENOUS at 20:24

## 2017-06-27 RX ADMIN — OXYCODONE HYDROCHLORIDE AND ACETAMINOPHEN 1 TABLET: 5; 325 TABLET ORAL at 10:25

## 2017-06-27 RX ADMIN — PIPERACILLIN SODIUM,TAZOBACTAM SODIUM 4.5 G: 4; .5 INJECTION, POWDER, FOR SOLUTION INTRAVENOUS at 05:51

## 2017-06-27 RX ADMIN — ONDANSETRON 4 MG: 2 INJECTION INTRAMUSCULAR; INTRAVENOUS at 15:59

## 2017-06-27 RX ADMIN — AMLODIPINE BESYLATE 5 MG: 5 TABLET ORAL at 10:26

## 2017-06-27 RX ADMIN — DOCUSATE SODIUM 100 MG: 100 CAPSULE, LIQUID FILLED ORAL at 17:18

## 2017-06-27 RX ADMIN — HEPARIN SODIUM 5000 UNITS: 5000 INJECTION, SOLUTION INTRAVENOUS; SUBCUTANEOUS at 01:58

## 2017-06-27 RX ADMIN — DOCUSATE SODIUM 100 MG: 100 CAPSULE, LIQUID FILLED ORAL at 10:25

## 2017-06-27 RX ADMIN — Medication 500 UNITS: at 10:28

## 2017-06-27 RX ADMIN — OXYCODONE HYDROCHLORIDE AND ACETAMINOPHEN 1 TABLET: 5; 325 TABLET ORAL at 15:59

## 2017-06-27 NOTE — PROGRESS NOTES
Infectious Disease Follow-up     Admit Date: 6/25/2017    Current abx Prior abx   Vancomycin, Zosyn 6/25 - 2 Levofloxacin 6/25 - 1       ASSESSMENT - > REC:      Fever  - unclear source. - with rigors during IV abx infusion must consider mediport infection (but no h/o fevers, chills sweats PTA until day PTA)  - w/ ?worsening back pain would consider Lumbar discitis / osteomyelitis but doubt  - doubt constipation x 1 week caused fever in this case (but occasionally can cause low grade fever)  - blcx 6/25 NGTD x 2, urcx IP  - still had Tmax 100.4 overnight but may be trending down -> continue empiric Vancomycin, zosyn  -> monitor blood cultures 6/25 - If NG and afebrile by tomorrow dc abx. If + may need removal of mediport  -> d/w Dr. Minerva Bassett. When creatinine normalizes get MRI lumbar spine   MYRON  - from N/V, poor intake  - Cr 2.6 on 6/25 (0.9 on 6/22)  - improving 1.7 today (6/27) -> per IM   Stage III adenoCa colon   - s/p left hemicolectomy September 2018  - chemotherapy x 8 cycles, last May 30  - not neutropenic     DM     HTN     Sleep apnea        MICROBIOLOGY:   6/25               blcx IP x 2                        urcx                    LINES AND CATHETERS:   mediport  piv      Active Hospital Problems    Diagnosis Date Noted    Sepsis ([facility] [address] 06/25/2017    Hypokalemia 06/25/2017    Lactic acidosis 06/25/2017    Leukocytosis 06/25/2017    MYRON (acute kidney injury) ([facility] [address] 06/25/2017       Subjective: Interval notes reviewed. Back pain persists. Had low grade fever last night. One loose stool this am.  WBC has normalized. No more shaking chills.     Current Facility-Administered Medications   Medication Dose Route Frequency    0.9% sodium chloride with KCl 20 mEq/L infusion   IntraVENous CONTINUOUS    heparin (porcine) injection 5,000 Units  5,000 Units SubCUTAneous Q8H    docusate sodium (COLACE) capsule 100 mg  100 mg Oral BID    oxyCODONE-acetaminophen (PERCOCET) 5-325 mg per tablet 1 Tab  1 Tab Oral Q4H PRN    vancomycin (VANCOCIN) 1,500 mg in 0.9% sodium chloride 500 mL IVPB  1,500 mg IntraVENous Q24H    piperacillin-tazobactam (ZOSYN) 4.5 g in 0.9% sodium chloride (MBP/ADV) 100 mL MBP ### EXTENDED 4 HOUR INFUSION ###   4.5 g IntraVENous Q8H    ELECTROLYTE REPLACEMENT PROTOCOL  1 Each Other PRN    atorvastatin (LIPITOR) tablet 20 mg  20 mg Oral QHS    gabapentin (NEURONTIN) capsule 400-800 mg  400-800 mg Oral TID PRN    ELECTROLYTE REPLACEMENT PROTOCOL  1 Each Other PRN    ELECTROLYTE REPLACEMENT PROTOCOL  1 Each Other PRN    ELECTROLYTE REPLACEMENT PROTOCOL  1 Each Other PRN    ELECTROLYTE REPLACEMENT PROTOCOL  1 Each Other PRN    heparin (porcine) pf 500 Units  500 Units InterCATHeter DAILY    ondansetron (ZOFRAN) injection 4 mg  4 mg IntraVENous Q4H PRN    pantoprazole (PROTONIX) granules for oral suspension 40 mg  40 mg Oral ACB    amLODIPine (NORVASC) tablet 5 mg  5 mg Oral DAILY    insulin lispro (HUMALOG) injection   SubCUTAneous AC&HS    glucose chewable tablet 16 g  16 g Oral PRN    glucagon (GLUCAGEN) injection 1 mg  1 mg IntraMUSCular PRN    dextrose (D50) infusion 12.5-25 g  25-50 mL IntraVENous PRN    [START ON 2017] VANCOMYCIN INFORMATION NOTE   Other ONCE    sodium chloride (NS) flush 5-10 mL  5-10 mL IntraVENous PRN    insulin detemir (LEVEMIR) injection 65 Units  65 Units SubCUTAneous QHS         Objective:     Visit Vitals    /68    Pulse (!) 101    Temp 98.6 °F (37 °C)    Resp 18    Ht 5' 9\" (1.753 m)    Wt 91.9 kg (202 lb 9.6 oz)    SpO2 95%    BMI 29.92 kg/m2       Temp (24hrs), Av °F (37.8 °C), Min:98.6 °F (37 °C), Max:100.9 °F (38.3 °C)      General: Well developed, well nourished 50 y.o.  male in no acute distress. HEENT: anicteric sclerae, no oral lesions  Cardio:  regular rhythm, S1, S2 normal, no murmur, click, rub or gallop.  Mediport  Chest: inspection normal - no chest wall deformities or tenderness, respiratory effort normal  Lungs: clear to auscultation, no wheezes or rales and unlabored breathing  Abdomen: soft, non-tender.  Bowel sounds normal. No masses, no organomegaly, well-healed abdominal surgical wound  Extremities:  no redness or tenderness in the calves or thighs, no edema  Neuro: Grossly normal     Labs: Results:   Chemistry Recent Labs      06/27/17 0415 06/26/17   1511  06/26/17 0415 06/25/17 1950   GLU  217*   --   117*  148*   NA  139   --   138  131*   K  3.9  4.2  3.5  3.0*   CL  108   --   106  95*   CO2  23   --   22  25   BUN  20*   --   23*  25*   CREA  1.72*   --   2.31*  2.59*   CA  7.0*   --   6.6*  8.4*   AGAP  8   --   10  11   BUCR  12   --   10*  10*   AP  223*   --   195*  313*   TP  5.6*   --   5.5*  8.6*   ALB  2.0*   --   2.2*  3.5   GLOB  3.6   --   3.3  5.1*   AGRAT  0.6*   --   0.7*  0.7*      CBC w/Diff Recent Labs      06/27/17 0415 06/26/17 0415 06/25/17 1950   WBC  10.6  13.7*  15.3*   RBC  3.49*  4.30*  4.59*   HGB  9.5*  11.9*  12.9*   HCT  28.8*  35.6*  37.6   PLT  120*  172  195   GRANS  78*  75*  76*   LYMPH  10*  11*  11*   EOS  1  1  1            Microbiology Results  Recent Labs      06/25/17 2005 06/25/17 1950   CULT  NO GROWTH 2 DAYS  NO GROWTH 2 DAYS       Angel Paulino MD  June 27, 2017  [facility] AT THE [facility]  [phone]

## 2017-06-27 NOTE — PROGRESS NOTES
Bedside and Verbal shift change report received from [address] [place] [address] (offgoing nurse). Report included the following information SBAR, Kardex, Intake/Output, MAR and Recent Results. 2010-Shift assessment completed, pt resting in bed, no distress noted, on room air, bed locked in low position, call bell within reach, will continue to monitor. 2211-Scheduled medications administered, pt tolerated well. 0155-Scheduled medications administered. 0316-Patient sleeping, will continue to monitor. 0605-PRN percocet administered for pain, will reassess. Bedside and Verbal shift change report given to [address] [place] [address] (oncoming nurse) by Shreya Chandra RN (offgoing nurse). Report included the following information SBAR, Kardex, Intake/Output, MAR and Recent Results.

## 2017-06-27 NOTE — PROGRESS NOTES
Progress Note      Patient: Zaira Ansari               Sex: male          DOA: 6/25/2017       YOB: 1965      Age:  50 y.o.        LOS:  LOS: 2 days               Subjective:   Discussed with  Dr Allie Cordero . Pt is on vancomycin and zosyn . Source of the fever is not clear. the sources include the mediport and possibly the pt's back. the blood and urine cultures are negative so far . Objective:      Visit Vitals    /74    Pulse 91    Temp 98.8 °F (37.1 °C)    Resp 18    Ht 5' 9\" (1.753 m)    Wt 91.9 kg (202 lb 9.6 oz)    SpO2 95%    BMI 29.92 kg/m2       Physical Exam:  Pt is awake and is alert   Heart reg rate and rhythm   Lungs good breath sounds heard   Abdomen soft and no pain on palpation   Neuro nonfocal        Lab/Data Reviewed:  CMP:   Lab Results   Component Value Date/Time     06/27/2017 04:15 AM    K 3.9 06/27/2017 04:15 AM     06/27/2017 04:15 AM    CO2 23 06/27/2017 04:15 AM    AGAP 8 06/27/2017 04:15 AM     (H) 06/27/2017 04:15 AM    BUN 20 (H) 06/27/2017 04:15 AM    CREA 1.72 (H) 06/27/2017 04:15 AM    GFRAA 51 (L) 06/27/2017 04:15 AM    GFRNA 42 (L) 06/27/2017 04:15 AM    CA 7.0 (L) 06/27/2017 04:15 AM    ALB 2.0 (L) 06/27/2017 04:15 AM    TP 5.6 (L) 06/27/2017 04:15 AM    GLOB 3.6 06/27/2017 04:15 AM    AGRAT 0.6 (L) 06/27/2017 04:15 AM    SGOT 98 (H) 06/27/2017 04:15 AM    ALT 55 06/27/2017 04:15 AM     CBC:   Lab Results   Component Value Date/Time    WBC 10.6 06/27/2017 04:15 AM    HGB 9.5 (L) 06/27/2017 04:15 AM    HCT 28.8 (L) 06/27/2017 04:15 AM     (L) 06/27/2017 04:15 AM           Assessment/Plan     Principal Problem:    Sepsis ([facility] (6/25/2017)    Active Problems:    Hypokalemia (6/25/2017)      Lactic acidosis (6/25/2017)      Leukocytosis (6/25/2017)      MYRON (acute kidney injury) ([facility] (6/25/2017)        Plan:pt will have an mri of the lumbar spine when the creatinine is in a safe range . continue the antibiotics .

## 2017-06-27 NOTE — PROGRESS NOTES
Bedside and Verbal shift change report received from [address] [place] [address] (offgoing nurse). Report included the following information SBAR, Kardex, Intake/Output, MAR, Recent Results and Cardiac Rhythm NSR BBB.     2200-Humalog held, see flow sheet. 2307-Scheduled medications and prn norco administered, will continue to monitor. 0155-Pt hr 131, up in the bathroom    0300-prn norco administered per patient request, will continue to monitor. Bedside and Verbal shift change report given to Tad Perez RN (oncoming nurse) by Opal Brian RN (offgoing nurse). Report included the following information SBAR, Kardex, Intake/Output, MAR and Recent Results.

## 2017-06-27 NOTE — PROGRESS NOTES
Progress Note      Patient: Crow Jacobo               Sex: male          DOA: 6/25/2017       YOB: 1965      Age:  50 y.o.        LOS:  LOS: 1 day               Subjective:   Help of infectious disease greatly appreciated . Will discuss with dr Parris Andino . the patient is presently on vancomycin and zosyn . Of interest is that the pt has [place] working [place]  [place] of the 9/11  [place] and a possible etiology for his colon cancer is exposure to  the site . This    is certainly true for lung diseases . Objective:      Visit Vitals    /75 (BP 1 Location: Left arm, BP Patient Position: At rest)    Pulse 99    Temp 100.1 °F (37.8 °C)    Resp 20    Ht 5' 9\" (1.753 m)    Wt 89.9 kg (198 lb 3.1 oz)    SpO2 94%    BMI 29.27 kg/m2       Physical Exam:  Pt is awake and alert . Heart reg ratre and rhythm   Lungs fair breath sounds heard  mediport in place   Abdomen soft and nontender.  Scar from his previous colectomy   Neuro nonfocal       Lab/Data Reviewed:  CMP:   Lab Results   Component Value Date/Time     06/26/2017 04:15 AM    K 4.2 06/26/2017 03:11 PM     06/26/2017 04:15 AM    CO2 22 06/26/2017 04:15 AM    AGAP 10 06/26/2017 04:15 AM     (H) 06/26/2017 04:15 AM    BUN 23 (H) 06/26/2017 04:15 AM    CREA 2.31 (H) 06/26/2017 04:15 AM    GFRAA 36 (L) 06/26/2017 04:15 AM    GFRNA 30 (L) 06/26/2017 04:15 AM    CA 6.6 (L) 06/26/2017 04:15 AM    MG 2.1 06/26/2017 03:11 PM    PHOS 2.4 (L) 06/26/2017 03:11 PM    ALB 2.2 (L) 06/26/2017 04:15 AM    TP 5.5 (L) 06/26/2017 04:15 AM    GLOB 3.3 06/26/2017 04:15 AM    AGRAT 0.7 (L) 06/26/2017 04:15 AM    SGOT 107 (H) 06/26/2017 04:15 AM    ALT 42 06/26/2017 04:15 AM     CBC:   Lab Results   Component Value Date/Time    WBC 13.7 (H) 06/26/2017 04:15 AM    HGB 11.9 (L) 06/26/2017 04:15 AM    HCT 35.6 (L) 06/26/2017 04:15 AM     06/26/2017 04:15 AM           Assessment/Plan Principal Problem:    Sepsis ([facility] (6/25/2017)from an unknown source     Active Problems:    Hypokalemia (6/25/2017)      Lactic acidosis (6/25/2017)      Leukocytosis (6/25/2017)      MYRON (acute kidney injury) ([facility] (6/25/2017)        Plan: will follow closely . discuss with id

## 2017-06-27 NOTE — PROGRESS NOTES
Report received from [facility]. Patient received in bed awake, and alert with zero c/o pain and zero s/s of distress. Patient is alert x 4 and is able to make all his needs known at this time. Patient is noted with 18g PIV to the right ac running 20mEq of Kcl at 100/hr patient is noted with an accessed Mediport to the left upper chest, he is room air with zero distress. Noted on a diabetic diet. Call bell within reach, bed in low position. Patient is currently in bed with zero c/o pain and zero s/s of distress. Bedside and Verbal shift change report given to [facility] RN. (oncoming nurse) by Chan Mcbride RN (offgoing nurse). Report included the following informatiSR, BBBSBAR, Intake/Output, MAR, Med Rec Status and Cardiac Rhythm SR, BBB.

## 2017-06-27 NOTE — PROGRESS NOTES
Report received from [address] [place] [address]. Patient was transferred from the ICU, he came in presenting some syncope and abdominal pain, he was noted with sepsis, hypokalemia, and lactic acidosis. Patient has a hx of colon cancer related to 9-11 after work,as well as a hx of DM. Patient is alert x 4 and is able to make all his needs known at this time. He is noted with 18g PIV to the right ac as well as a accessed mediport to the left chest. Patient c/o pain 4/10 and requested a percocet and states medication was effective. Patient is currently in bed with zero c/o pain and zero s/s of distress. Bedside and Verbal shift change report given to sage MITCHELL (oncoming nurse) by Rashi Francisco RN (offgoing nurse). Report included the following information SBAR, OR Summary, Procedure Summary, Intake/Output, MAR and Med Rec Status.

## 2017-06-28 LAB
ALBUMIN SERPL BCP-MCNC: 2 G/DL (ref 3.4–5)
ALBUMIN/GLOB SERPL: 0.5 {RATIO} (ref 0.8–1.7)
ALP SERPL-CCNC: 238 U/L (ref 45–117)
ALT SERPL-CCNC: 47 U/L (ref 16–61)
ANION GAP BLD CALC-SCNC: 8 MMOL/L (ref 3–18)
AST SERPL W P-5'-P-CCNC: 73 U/L (ref 15–37)
BASOPHILS # BLD AUTO: 0 K/UL (ref 0–0.06)
BASOPHILS # BLD: 0 % (ref 0–2)
BILIRUB SERPL-MCNC: 0.9 MG/DL (ref 0.2–1)
BUN SERPL-MCNC: 11 MG/DL (ref 7–18)
BUN/CREAT SERPL: 11 (ref 12–20)
CALCIUM SERPL-MCNC: 7.5 MG/DL (ref 8.5–10.1)
CHLORIDE SERPL-SCNC: 108 MMOL/L (ref 100–108)
CO2 SERPL-SCNC: 24 MMOL/L (ref 21–32)
CREAT SERPL-MCNC: 1.04 MG/DL (ref 0.6–1.3)
DIFFERENTIAL METHOD BLD: ABNORMAL
EOSINOPHIL # BLD: 0.1 K/UL (ref 0–0.4)
EOSINOPHIL NFR BLD: 1 % (ref 0–5)
ERYTHROCYTE [DISTWIDTH] IN BLOOD BY AUTOMATED COUNT: 16 % (ref 11.6–14.5)
GLOBULIN SER CALC-MCNC: 3.9 G/DL (ref 2–4)
GLUCOSE BLD STRIP.AUTO-MCNC: 107 MG/DL (ref 70–110)
GLUCOSE BLD STRIP.AUTO-MCNC: 111 MG/DL (ref 70–110)
GLUCOSE BLD STRIP.AUTO-MCNC: 81 MG/DL (ref 70–110)
GLUCOSE BLD STRIP.AUTO-MCNC: 92 MG/DL (ref 70–110)
GLUCOSE SERPL-MCNC: 66 MG/DL (ref 74–99)
HCT VFR BLD AUTO: 28 % (ref 36–48)
HGB BLD-MCNC: 9.3 G/DL (ref 13–16)
LYMPHOCYTES # BLD AUTO: 8 % (ref 21–52)
LYMPHOCYTES # BLD: 0.8 K/UL (ref 0.9–3.6)
MCH RBC QN AUTO: 27.2 PG (ref 24–34)
MCHC RBC AUTO-ENTMCNC: 33.2 G/DL (ref 31–37)
MCV RBC AUTO: 81.9 FL (ref 74–97)
MONOCYTES # BLD: 0.7 K/UL (ref 0.05–1.2)
MONOCYTES NFR BLD AUTO: 8 % (ref 3–10)
NEUTS SEG # BLD: 7.8 K/UL (ref 1.8–8)
NEUTS SEG NFR BLD AUTO: 83 % (ref 40–73)
PLATELET # BLD AUTO: 131 K/UL (ref 135–420)
PMV BLD AUTO: 10.2 FL (ref 9.2–11.8)
POTASSIUM SERPL-SCNC: 3.7 MMOL/L (ref 3.5–5.5)
PROT SERPL-MCNC: 5.9 G/DL (ref 6.4–8.2)
RBC # BLD AUTO: 3.42 M/UL (ref 4.7–5.5)
SODIUM SERPL-SCNC: 140 MMOL/L (ref 136–145)
WBC # BLD AUTO: 9.3 K/UL (ref 4.6–13.2)

## 2017-06-28 PROCEDURE — 65660000000 HC RM CCU STEPDOWN

## 2017-06-28 PROCEDURE — 74011250636 HC RX REV CODE- 250/636: Performed by: HOSPITALIST

## 2017-06-28 PROCEDURE — 74011250637 HC RX REV CODE- 250/637: Performed by: FAMILY MEDICINE

## 2017-06-28 PROCEDURE — 82962 GLUCOSE BLOOD TEST: CPT

## 2017-06-28 PROCEDURE — 74011250637 HC RX REV CODE- 250/637: Performed by: HOSPITALIST

## 2017-06-28 PROCEDURE — 80053 COMPREHEN METABOLIC PANEL: CPT | Performed by: HOSPITALIST

## 2017-06-28 PROCEDURE — 85025 COMPLETE CBC W/AUTO DIFF WBC: CPT | Performed by: HOSPITALIST

## 2017-06-28 PROCEDURE — 74011636637 HC RX REV CODE- 636/637: Performed by: HOSPITALIST

## 2017-06-28 PROCEDURE — 74011250636 HC RX REV CODE- 250/636: Performed by: FAMILY MEDICINE

## 2017-06-28 PROCEDURE — 74011000258 HC RX REV CODE- 258: Performed by: HOSPITALIST

## 2017-06-28 PROCEDURE — 36415 COLL VENOUS BLD VENIPUNCTURE: CPT | Performed by: HOSPITALIST

## 2017-06-28 RX ADMIN — ONDANSETRON 4 MG: 2 INJECTION INTRAMUSCULAR; INTRAVENOUS at 07:49

## 2017-06-28 RX ADMIN — OXYCODONE HYDROCHLORIDE AND ACETAMINOPHEN 1 TABLET: 5; 325 TABLET ORAL at 22:27

## 2017-06-28 RX ADMIN — ONDANSETRON 4 MG: 2 INJECTION INTRAMUSCULAR; INTRAVENOUS at 15:17

## 2017-06-28 RX ADMIN — AMLODIPINE BESYLATE 5 MG: 5 TABLET ORAL at 10:06

## 2017-06-28 RX ADMIN — PIPERACILLIN SODIUM,TAZOBACTAM SODIUM 4.5 G: 4; .5 INJECTION, POWDER, FOR SOLUTION INTRAVENOUS at 22:29

## 2017-06-28 RX ADMIN — Medication 500 UNITS: at 10:06

## 2017-06-28 RX ADMIN — POTASSIUM CHLORIDE AND SODIUM CHLORIDE: 900; 150 INJECTION, SOLUTION INTRAVENOUS at 22:30

## 2017-06-28 RX ADMIN — PIPERACILLIN SODIUM,TAZOBACTAM SODIUM 4.5 G: 4; .5 INJECTION, POWDER, FOR SOLUTION INTRAVENOUS at 05:20

## 2017-06-28 RX ADMIN — DOCUSATE SODIUM 100 MG: 100 CAPSULE, LIQUID FILLED ORAL at 10:06

## 2017-06-28 RX ADMIN — OXYCODONE HYDROCHLORIDE AND ACETAMINOPHEN 1 TABLET: 5; 325 TABLET ORAL at 03:01

## 2017-06-28 RX ADMIN — SODIUM CHLORIDE 1500 MG: 900 INJECTION, SOLUTION INTRAVENOUS at 00:47

## 2017-06-28 RX ADMIN — OXYCODONE HYDROCHLORIDE AND ACETAMINOPHEN 1 TABLET: 5; 325 TABLET ORAL at 15:17

## 2017-06-28 RX ADMIN — INSULIN DETEMIR 68 UNITS: 100 INJECTION, SOLUTION SUBCUTANEOUS at 22:28

## 2017-06-28 RX ADMIN — ONDANSETRON 4 MG: 2 INJECTION INTRAMUSCULAR; INTRAVENOUS at 22:28

## 2017-06-28 RX ADMIN — PIPERACILLIN SODIUM,TAZOBACTAM SODIUM 4.5 G: 4; .5 INJECTION, POWDER, FOR SOLUTION INTRAVENOUS at 12:41

## 2017-06-28 RX ADMIN — DOCUSATE SODIUM 100 MG: 100 CAPSULE, LIQUID FILLED ORAL at 17:32

## 2017-06-28 RX ADMIN — ATORVASTATIN CALCIUM 20 MG: 20 TABLET, FILM COATED ORAL at 22:28

## 2017-06-28 RX ADMIN — OXYCODONE HYDROCHLORIDE AND ACETAMINOPHEN 1 TABLET: 5; 325 TABLET ORAL at 07:49

## 2017-06-28 RX ADMIN — PANTOPRAZOLE SODIUM 40 MG: 40 GRANULE, DELAYED RELEASE ORAL at 07:49

## 2017-06-28 NOTE — PROGRESS NOTES
Amina Grullon 38 care of patient. Patient alert and pleasant. C/O 6 out of 10 back  pain and nausea. RN zofran and percocet given. Call bell within reach. Will continue to monitor. 0950 Pain med effective patient, patient now denies pain. All am medication given and tolerated well    1250 Reassessment done and no changes noted. IDR rounds completed. MRI for back to be ordered    1510 PRN pain med given and tolerated for lower back pain. 1630 reassessment for pain done. Patient resting quietly and said pain was relieved. 1945 Bedside shift change report given to [address] [place] [address] (oncoming nurse) by Zoe Khan RN  (offgoing nurse). Report included the following information SBAR, Kardex, Intake/Output and MAR.

## 2017-06-28 NOTE — PROGRESS NOTES
Chart reviewed. Plan remains home when medically stable. Will cont to follow for any needs. Yana Nuno RN,ext. [phone].

## 2017-06-28 NOTE — PROGRESS NOTES
Infectious Disease Follow-up     Admit Date: 6/25/2017    Current abx Prior abx   Vancomycin, Zosyn 6/25 - 3 Levofloxacin 6/25 - 1       ASSESSMENT - > REC:      Fever  - unclear source. - with rigors during IV abx infusion 6/25 must consider mediport infection (but no h/o fevers, chills sweats PTA until day PTA)  - w/ ?worsening back pain would consider Lumbar discitis / osteomyelitis but doubt  - doubt constipation x 1 week caused fever in this case (but occasionally can cause low grade fever)  - blcx 6/25 NGTD x 2, urcx IP  - still had Tmax 100.4 overnight but may be trending down -> continue empiric Vancomycin, zosyn  -> monitor blood cultures 6/25 - If NG and afebrile by tomorrow dc abx. If + may need removal of mediport  -> d/w Dr. Latrice Foster. Since creatinine has normalized get MRI lumbar spine (back pain worse, alk phos still up)   Worsening back pain  - had intermittent Lumbar pain from old injuries PTA but steadily worsening since 3 weeks PTA  - alk phos elevated 238 with minimally elevated AST, nl ALT- > bone alk phos?  - no bacteremia but need to r/o OM/discitis vs metastasis -> MRI Lumbar spine per IM   MYRON  - from N/V, poor intake  - Cr 2.6 on 6/25 (0.9 on 6/22)  - resolved Cr 1.04 today (6/28) -> per IM   Stage III adenoCa colon   - s/p left hemicolectomy September 2018  - chemotherapy x 8 cycles, last May 30  - not neutropenic     DM     HTN     Sleep apnea        MICROBIOLOGY:   6/25               blcx IP x 2                        urcx                    LINES AND CATHETERS:   mediport  piv      Active Hospital Problems    Diagnosis Date Noted    Sepsis ([facility] [address] 06/25/2017    Hypokalemia 06/25/2017    Lactic acidosis 06/25/2017    Leukocytosis 06/25/2017    MYRON (acute kidney injury) ([facility] [address] 06/25/2017       Subjective: Interval notes reviewed. Back pain persists - may be worse. Fever trending downard. WBC remains normal.  Alkaline phosphate still elevated.     Current Facility-Administered Medications   Medication Dose Route Frequency    insulin detemir (LEVEMIR) injection 68 Units  68 Units SubCUTAneous QHS    0.9% sodium chloride with KCl 20 mEq/L infusion   IntraVENous CONTINUOUS    heparin (porcine) injection 5,000 Units  5,000 Units SubCUTAneous Q8H    docusate sodium (COLACE) capsule 100 mg  100 mg Oral BID    oxyCODONE-acetaminophen (PERCOCET) 5-325 mg per tablet 1 Tab  1 Tab Oral Q4H PRN    vancomycin (VANCOCIN) 1,500 mg in 0.9% sodium chloride 500 mL IVPB  1,500 mg IntraVENous Q24H    piperacillin-tazobactam (ZOSYN) 4.5 g in 0.9% sodium chloride (MBP/ADV) 100 mL MBP ### EXTENDED 4 HOUR INFUSION ###   4.5 g IntraVENous Q8H    ELECTROLYTE REPLACEMENT PROTOCOL  1 Each Other PRN    atorvastatin (LIPITOR) tablet 20 mg  20 mg Oral QHS    gabapentin (NEURONTIN) capsule 400-800 mg  400-800 mg Oral TID PRN    ELECTROLYTE REPLACEMENT PROTOCOL  1 Each Other PRN    ELECTROLYTE REPLACEMENT PROTOCOL  1 Each Other PRN    ELECTROLYTE REPLACEMENT PROTOCOL  1 Each Other PRN    ELECTROLYTE REPLACEMENT PROTOCOL  1 Each Other PRN    heparin (porcine) pf 500 Units  500 Units InterCATHeter DAILY    ondansetron (ZOFRAN) injection 4 mg  4 mg IntraVENous Q4H PRN    pantoprazole (PROTONIX) granules for oral suspension 40 mg  40 mg Oral ACB    amLODIPine (NORVASC) tablet 5 mg  5 mg Oral DAILY    insulin lispro (HUMALOG) injection   SubCUTAneous AC&HS    glucose chewable tablet 16 g  16 g Oral PRN    glucagon (GLUCAGEN) injection 1 mg  1 mg IntraMUSCular PRN    dextrose (D50) infusion 12.5-25 g  25-50 mL IntraVENous PRN    [START ON 2017] VANCOMYCIN INFORMATION NOTE   Other ONCE    sodium chloride (NS) flush 5-10 mL  5-10 mL IntraVENous PRN         Objective:     Visit Vitals    /75    Pulse 84    Temp 98.9 °F (37.2 °C)    Resp 18    Ht 5' 9\" (1.753 m)    Wt 91.9 kg (202 lb 9.6 oz)    SpO2 92%    BMI 29.92 kg/m2       Temp (24hrs), Av.1 °F (37.3 °C), Min:98.8 °F (37.1 °C), Max:99.8 °F (37.7 °C)      General: Well developed, well nourished 50 y.o.  male in no acute distress. HEENT: anicteric sclerae, no oral lesions  Cardio:  regular rhythm, S1, S2 normal, no murmur, click, rub or gallop. Mediport  Chest: inspection normal - no chest wall deformities or tenderness, respiratory effort normal  Lungs: clear to auscultation, no wheezes or rales and unlabored breathing  Abdomen: soft, non-tender.  Bowel sounds normal. No masses, no organomegaly, well-healed abdominal surgical wound  Extremities:  no redness or tenderness in the calves or thighs, no edema  Neuro: Grossly normal     Labs: Results:   Chemistry Recent Labs      06/28/17 0456 06/27/17 0415 06/26/17   1511  06/26/17 0415   GLU  66*  217*   --   117*   NA  140  139   --   138   K  3.7  3.9  4.2  3.5   CL  108  108   --   106   CO2  24  23   --   22   BUN  11  20*   --   23*   CREA  1.04  1.72*   --   2.31*   CA  7.5*  7.0*   --   6.6*   AGAP  8  8   --   10   BUCR  11*  12   --   10*   AP  238*  223*   --   195*   TP  5.9*  5.6*   --   5.5*   ALB  2.0*  2.0*   --   2.2*   GLOB  3.9  3.6   --   3.3   AGRAT  0.5*  0.6*   --   0.7*      CBC w/Diff Recent Labs      06/28/17 0456 06/27/17 0415 06/26/17 0415   WBC  9.3  10.6  13.7*   RBC  3.42*  3.49*  4.30*   HGB  9.3*  9.5*  11.9*   HCT  28.0*  28.8*  35.6*   PLT  131*  120*  172   GRANS  83*  78*  75*   LYMPH  8*  10*  11*   EOS  1  1  1            Microbiology Results  Recent Labs      06/25/17 2021 06/25/17 2005 06/25/17   1950   CULT  NO GROWTH 1 DAY  NO GROWTH 3 DAYS  NO GROWTH 3 DAYS       Emre Sandhu MD  June 28, 2017  [facility] AT THE [facility]  [phone]

## 2017-06-28 NOTE — ADT AUTH CERT NOTES
Utilization Review           Sepsis and Other Febrile Illness, without Focal Infection - Care Day 4 (6/28/2017) by Nathanael Thomas RN        Review Status Review Entered       Completed 6/28/2017       Details              Care Day: 4 Care Date: 6/28/2017 Level of Care:        Guideline Day 2        Clinical Status       ( ) * Hemodynamic stability       6/28/2017 12:49 PM EDT by Iza Rose                       ( ) * Fever absent or reduced       6/28/2017 12:49 PM EDT by Iza Rose         low grade 99.1                     Routes       (X) Possible IV fluids       (X) Diet as tolerated              Interventions       (X) WBC              Medications       (X) Antibiotics       (X) Possible DVT prophylaxis              6/28/2017 12:49 PM EDT by Iza Rose       Subject: Additional Clinical Information       vs: 99.1, 107, 143/79, 18, 92%orders: diabetic diet, pulse ox, cardiac monitor, vs continuous, strict I&O, neurosmeds: ns with 20kcl at 75cc/hr, heparin 5000u q 8hrs, zofran 4mg iv, percocet 5mg x 2, zosyn 4.5g iv q 8hrs, vancomycin 1500mg iv, labs:hgb 9.3, hct 28, neutrophils 83, glucose 66, calcium 7.5, ast 73, alk phos 238          Fever  - unclear source. - with rigors during IV abx infusion 6/25 must consider mediport infection (but no h/o fevers, chills sweats PTA until day PTA)  - w/ ?worsening back pain would consider Lumbar discitis / osteomyelitis but doubt  - doubt constipation x 1 week caused fever in this case (but occasionally can cause low grade fever)  - blcx 6/25 NGTD x 2, urcx IP  - still had Tmax 100.4 overnight but may be trending down      -> continue empiric Vancomycin, zosyn  ->  monitor blood cultures 6/25 - If NG and afebrile by tomorrow dc abx.   If + may need removal of mediport  -> d/w Dr. Colletta Kapur.  Since creatinine has normalized get MRI lumbar spine (back pain worse, alk phos still up)          Worsening back pain  - had intermittent Lumbar pain from old injuries PTA but steadily worsening since 3 weeks PTA  - alk phos elevated 238 with minimally elevated AST, nl ALT- > bone alk phos?  - no bacteremia but need to r/o OM/discitis vs metastasis      -> MRI Lumbar spine per IM          MYRON  - from N/V, poor intake  - Cr 2.6 on 6/25 (0.9 on 6/22)  - resolved Cr 1.04 today (6/28)      -> per IM          Stage III adenoCa colon    - s/p left hemicolectomy September 2018  - chemotherapy x 8 cycles, last May 30  - not neutropenic                                                     * Milestone                  Sepsis and Other Febrile Illness, without Focal Infection - Care Day 2 (6/26/2017) by Madi Braun RN        Review Status Review Entered       Completed 6/26/2017       Details              Care Day: 2 Care Date: 6/26/2017 Level of Care:        Guideline Day 2        Clinical Status       ( ) * Hemodynamic stability       6/26/2017 2:35 PM EDT by Samella Po         still tachycardic at 113              ( ) * Fever absent or reduced       6/26/2017 2:35 PM EDT by Samella Po         t 100. 6                     Routes       (X) Possible IV fluids       6/26/2017 2:35 PM EDT by Samella Po         ns with 20kcl at 100cc/hr              (X) Diet as tolerated       6/26/2017 2:35 PM EDT by Samella Po         diabetic diet                     Interventions       (X) WBC       6/26/2017 2:35 PM EDT by Samella Po         13. 7                     Medications       (X) Antibiotics       6/26/2017 2:35 PM EDT by Samella Po         zosyn 4.5g iv, vancomycin 1000mg iv              (X) Possible DVT prophylaxis       6/26/2017 2:35 PM EDT by Samella Po         heparin 5000U q 8 hrs                     6/26/2017 2:35 PM EDT by Samella Po       Subject: Additional Clinical Information       vs: 100.6, 113, 125/74, 19, 100%orders: diabetic diet, pulse ox, vs continuous, bedrest, strict I&O, neuros, meds: ns with 20KLC with 100cc/hr, heparin 5000U q 8hrs, percocet 5mg x 3 doses, zosyn 4.5g iv q 8hrs, mag 2g iv, vancomycin 1000mg iv, labs: wbc 13.7, hgb 11.9, hct 35.6, neutrophils 75, glucose 117, bun 23, crt 2.31, calcium 6.6, mag 1.2, ast 107, alk phos 195He was found to have a temperature of 101.1 and pulse of 117.   CXR showed no pneumonia and Urinalysis showed 11 -20 WBC.   He was started empirically on Vancomycin, Zosyn and Levofloxacin and admitted.   During the infusion of IV antibiotics he experience uncontrollable shaking chills.   Blood cultures have been taken and are pending.   Back pain has been controlled with Dilaudid          Fever  - unclear source.   - with rigors during IV abx infusion must consider mediport infection (but no h/o fevers, chills sweats PTA until day PTA)  - w/ ?worsening back pain would consider Lumbar discitis / osteomyelitis but doubt  - doubt constipation x 1 week caused fever in this case (but occasionally can cause low grade fever)      -> continue empiric Vancomycin, zosyn  -> dc Levofloxacin  -> monitor blood cultures 6/25 - If NG by tomorrow dc abx.   If + may need removal of mediport  -> MRI lumbar spine r/o discitis / OM when renal function improves          MYRON  - from N/V, poor intake  - Cr 2.6 on 6/25 (0.9 on 6/22)      -> per IM          stage III adenoCa colon   - s/p left hemicolectomy September 2018  - chemotherapy x 8 cycles, last May 30                  DM                  HTN                  Sleep apnea

## 2017-06-29 ENCOUNTER — APPOINTMENT (OUTPATIENT)
Dept: MRI IMAGING | Age: 52
DRG: 872 | End: 2017-06-29
Attending: HOSPITALIST
Payer: COMMERCIAL

## 2017-06-29 LAB
ALBUMIN SERPL BCP-MCNC: 2.2 G/DL (ref 3.4–5)
ALBUMIN/GLOB SERPL: 0.5 {RATIO} (ref 0.8–1.7)
ALP SERPL-CCNC: 232 U/L (ref 45–117)
ALT SERPL-CCNC: 41 U/L (ref 16–61)
ANION GAP BLD CALC-SCNC: 7 MMOL/L (ref 3–18)
AST SERPL W P-5'-P-CCNC: 52 U/L (ref 15–37)
BASOPHILS # BLD AUTO: 0 K/UL (ref 0–0.06)
BASOPHILS # BLD: 0 % (ref 0–2)
BILIRUB SERPL-MCNC: 0.8 MG/DL (ref 0.2–1)
BUN SERPL-MCNC: 9 MG/DL (ref 7–18)
BUN/CREAT SERPL: 10 (ref 12–20)
CALCIUM SERPL-MCNC: 8.1 MG/DL (ref 8.5–10.1)
CHLORIDE SERPL-SCNC: 106 MMOL/L (ref 100–108)
CO2 SERPL-SCNC: 27 MMOL/L (ref 21–32)
CREAT SERPL-MCNC: 0.91 MG/DL (ref 0.6–1.3)
DATE LAST DOSE: ABNORMAL
DIFFERENTIAL METHOD BLD: ABNORMAL
EOSINOPHIL # BLD: 0.1 K/UL (ref 0–0.4)
EOSINOPHIL NFR BLD: 1 % (ref 0–5)
ERYTHROCYTE [DISTWIDTH] IN BLOOD BY AUTOMATED COUNT: 16 % (ref 11.6–14.5)
GLOBULIN SER CALC-MCNC: 4.3 G/DL (ref 2–4)
GLUCOSE BLD STRIP.AUTO-MCNC: 115 MG/DL (ref 70–110)
GLUCOSE BLD STRIP.AUTO-MCNC: 154 MG/DL (ref 70–110)
GLUCOSE BLD STRIP.AUTO-MCNC: 60 MG/DL (ref 70–110)
GLUCOSE BLD STRIP.AUTO-MCNC: 92 MG/DL (ref 70–110)
GLUCOSE SERPL-MCNC: 89 MG/DL (ref 74–99)
HCT VFR BLD AUTO: 30.1 % (ref 36–48)
HGB BLD-MCNC: 10.2 G/DL (ref 13–16)
LYMPHOCYTES # BLD AUTO: 11 % (ref 21–52)
LYMPHOCYTES # BLD: 1 K/UL (ref 0.9–3.6)
MCH RBC QN AUTO: 27.5 PG (ref 24–34)
MCHC RBC AUTO-ENTMCNC: 33.9 G/DL (ref 31–37)
MCV RBC AUTO: 81.1 FL (ref 74–97)
MONOCYTES # BLD: 0.7 K/UL (ref 0.05–1.2)
MONOCYTES NFR BLD AUTO: 8 % (ref 3–10)
NEUTS SEG # BLD: 7.1 K/UL (ref 1.8–8)
NEUTS SEG NFR BLD AUTO: 80 % (ref 40–73)
PLATELET # BLD AUTO: 148 K/UL (ref 135–420)
PMV BLD AUTO: 9.5 FL (ref 9.2–11.8)
POTASSIUM SERPL-SCNC: 3.6 MMOL/L (ref 3.5–5.5)
PROT SERPL-MCNC: 6.5 G/DL (ref 6.4–8.2)
RBC # BLD AUTO: 3.71 M/UL (ref 4.7–5.5)
REPORTED DOSE,DOSE: ABNORMAL UNITS
REPORTED DOSE/TIME,TMG: 100
SODIUM SERPL-SCNC: 140 MMOL/L (ref 136–145)
VANCOMYCIN TROUGH SERPL-MCNC: 6 UG/ML (ref 10–20)
WBC # BLD AUTO: 8.8 K/UL (ref 4.6–13.2)

## 2017-06-29 PROCEDURE — 85025 COMPLETE CBC W/AUTO DIFF WBC: CPT | Performed by: HOSPITALIST

## 2017-06-29 PROCEDURE — 74011250637 HC RX REV CODE- 250/637: Performed by: FAMILY MEDICINE

## 2017-06-29 PROCEDURE — 65660000000 HC RM CCU STEPDOWN

## 2017-06-29 PROCEDURE — 74011250636 HC RX REV CODE- 250/636: Performed by: HOSPITALIST

## 2017-06-29 PROCEDURE — 74011636637 HC RX REV CODE- 636/637: Performed by: HOSPITALIST

## 2017-06-29 PROCEDURE — 74011000258 HC RX REV CODE- 258: Performed by: HOSPITALIST

## 2017-06-29 PROCEDURE — 80202 ASSAY OF VANCOMYCIN: CPT | Performed by: HOSPITALIST

## 2017-06-29 PROCEDURE — 82962 GLUCOSE BLOOD TEST: CPT

## 2017-06-29 PROCEDURE — 36415 COLL VENOUS BLD VENIPUNCTURE: CPT | Performed by: HOSPITALIST

## 2017-06-29 PROCEDURE — 72158 MRI LUMBAR SPINE W/O & W/DYE: CPT

## 2017-06-29 PROCEDURE — 77030020263 HC SOL INJ SOD CL0.9% LFCR 1000ML

## 2017-06-29 PROCEDURE — A9585 GADOBUTROL INJECTION: HCPCS | Performed by: HOSPITALIST

## 2017-06-29 PROCEDURE — 74011250637 HC RX REV CODE- 250/637: Performed by: HOSPITALIST

## 2017-06-29 PROCEDURE — 80053 COMPREHEN METABOLIC PANEL: CPT | Performed by: HOSPITALIST

## 2017-06-29 RX ORDER — PANTOPRAZOLE SODIUM 40 MG/1
40 TABLET, DELAYED RELEASE ORAL
Status: DISCONTINUED | OUTPATIENT
Start: 2017-06-30 | End: 2017-07-01 | Stop reason: HOSPADM

## 2017-06-29 RX ADMIN — DOCUSATE SODIUM 100 MG: 100 CAPSULE, LIQUID FILLED ORAL at 17:27

## 2017-06-29 RX ADMIN — GADOBUTROL 10 ML: 604.72 INJECTION INTRAVENOUS at 14:00

## 2017-06-29 RX ADMIN — AMLODIPINE BESYLATE 5 MG: 5 TABLET ORAL at 09:04

## 2017-06-29 RX ADMIN — OXYCODONE HYDROCHLORIDE AND ACETAMINOPHEN 1 TABLET: 5; 325 TABLET ORAL at 02:16

## 2017-06-29 RX ADMIN — SODIUM CHLORIDE 1250 MG: 900 INJECTION, SOLUTION INTRAVENOUS at 22:16

## 2017-06-29 RX ADMIN — ONDANSETRON 4 MG: 2 INJECTION INTRAMUSCULAR; INTRAVENOUS at 11:13

## 2017-06-29 RX ADMIN — ONDANSETRON 4 MG: 2 INJECTION INTRAMUSCULAR; INTRAVENOUS at 06:43

## 2017-06-29 RX ADMIN — ATORVASTATIN CALCIUM 20 MG: 20 TABLET, FILM COATED ORAL at 22:11

## 2017-06-29 RX ADMIN — OXYCODONE HYDROCHLORIDE AND ACETAMINOPHEN 1 TABLET: 5; 325 TABLET ORAL at 15:30

## 2017-06-29 RX ADMIN — INSULIN LISPRO 2 UNITS: 100 INJECTION, SOLUTION INTRAVENOUS; SUBCUTANEOUS at 12:19

## 2017-06-29 RX ADMIN — PIPERACILLIN SODIUM,TAZOBACTAM SODIUM 4.5 G: 4; .5 INJECTION, POWDER, FOR SOLUTION INTRAVENOUS at 19:27

## 2017-06-29 RX ADMIN — Medication 500 UNITS: at 12:51

## 2017-06-29 RX ADMIN — INSULIN DETEMIR 60 UNITS: 100 INJECTION, SOLUTION SUBCUTANEOUS at 22:11

## 2017-06-29 RX ADMIN — SODIUM CHLORIDE 1500 MG: 900 INJECTION, SOLUTION INTRAVENOUS at 05:11

## 2017-06-29 RX ADMIN — DOCUSATE SODIUM 100 MG: 100 CAPSULE, LIQUID FILLED ORAL at 09:04

## 2017-06-29 RX ADMIN — PIPERACILLIN SODIUM,TAZOBACTAM SODIUM 4.5 G: 4; .5 INJECTION, POWDER, FOR SOLUTION INTRAVENOUS at 12:19

## 2017-06-29 RX ADMIN — SODIUM CHLORIDE 2000 MG: 900 INJECTION, SOLUTION INTRAVENOUS at 12:19

## 2017-06-29 RX ADMIN — ONDANSETRON 4 MG: 2 INJECTION INTRAMUSCULAR; INTRAVENOUS at 23:41

## 2017-06-29 RX ADMIN — ONDANSETRON 4 MG: 2 INJECTION INTRAMUSCULAR; INTRAVENOUS at 02:15

## 2017-06-29 RX ADMIN — OXYCODONE HYDROCHLORIDE AND ACETAMINOPHEN 1 TABLET: 5; 325 TABLET ORAL at 19:24

## 2017-06-29 RX ADMIN — ONDANSETRON 4 MG: 2 INJECTION INTRAMUSCULAR; INTRAVENOUS at 19:24

## 2017-06-29 RX ADMIN — OXYCODONE HYDROCHLORIDE AND ACETAMINOPHEN 1 TABLET: 5; 325 TABLET ORAL at 23:41

## 2017-06-29 RX ADMIN — OXYCODONE HYDROCHLORIDE AND ACETAMINOPHEN 1 TABLET: 5; 325 TABLET ORAL at 06:42

## 2017-06-29 RX ADMIN — PIPERACILLIN SODIUM,TAZOBACTAM SODIUM 4.5 G: 4; .5 INJECTION, POWDER, FOR SOLUTION INTRAVENOUS at 05:09

## 2017-06-29 RX ADMIN — ONDANSETRON 4 MG: 2 INJECTION INTRAMUSCULAR; INTRAVENOUS at 15:30

## 2017-06-29 RX ADMIN — OXYCODONE HYDROCHLORIDE AND ACETAMINOPHEN 1 TABLET: 5; 325 TABLET ORAL at 11:13

## 2017-06-29 NOTE — PROGRESS NOTES
Infectious Disease Follow-up     Admit Date: 6/25/2017    Current abx Prior abx   Vancomycin, Zosyn 6/25-4 Levofloxacin 6/25 - 1       ASSESSMENT - > REC:      Fever  - unclear source. - with rigors during IV abx infusion 6/25 must consider mediport infection (but no h/o fevers, chills sweats PTA until day PTA)  - w/ ?worsening back pain would consider Lumbar discitis / osteomyelitis but doubt  - doubt constipation x 1 week caused fever in this case (but occasionally can cause low grade fever)  - blcx 6/25 NGTD x 2, urcx ng  - Tm past 24h still had Tmax 99.8 overnight but may be trending down -> continue empiric Vancomycin, zosyn  -> monitor blood cultures 6/25, ngtd - If NG final and afebrile likely stop abx but await MRI. If + may need removal of mediport     Worsening back pain  - had intermittent Lumbar pain from old injuries PTA but steadily worsening since 3 weeks PTA  - alk phos elevated 238 with minimally elevated AST, nl ALT- > bone alk phos?  -CRP 13 no ESR  - no bacteremia but need to r/o OM/discitis vs metastasis ->await MRI Lumbar spine per IM   MYRON  - from N/V, poor intake  - Cr 2.6 on 6/25 (0.9 on 6/22)  - resolved Cr 0.9 today (6/29) -> per IM   Stage III adenoCa colon   - s/p left hemicolectomy September 2018  - chemotherapy x 8 cycles, last May 30  - not neutropenic     DM     HTN     Sleep apnea        MICROBIOLOGY:   6/25 blcx x 2 ngtd   uctx ng                    LINES AND CATHETERS:   mediport  piv      Active Hospital Problems    Diagnosis Date Noted    Sepsis ([facility] [address] 06/25/2017    Hypokalemia 06/25/2017    Lactic acidosis 06/25/2017    Leukocytosis 06/25/2017    MYRON (acute kidney injury) ([facility] [address] 06/25/2017       Subjective: Interval notes reviewed. Back pain persists, not localized, fever improving, no adr d/w spouse and pt Cr lower again today.  Dr. Jae Cash ordered MRI, d/w them if s/o infection may need Bx aspiration surgery, spouse asking if could show tumor, pt reports longstanding back pain L4-S1, pain med late last night and worse, no adr, got ill day prior to go to [place] for follow-up scans.       Current Facility-Administered Medications   Medication Dose Route Frequency    vancomycin (VANCOCIN) 2,000 mg in 0.9% sodium chloride 500 mL IVPB  2,000 mg IntraVENous ONCE    vancomycin (VANCOCIN) 1,250 mg in 0.9% sodium chloride 250 mL IVPB  1,250 mg IntraVENous Q12H    [START ON 7/1/2017] VANCOMYCIN INFORMATION NOTE   Other ONCE    insulin detemir (LEVEMIR) injection 68 Units  68 Units SubCUTAneous QHS    0.9% sodium chloride with KCl 20 mEq/L infusion   IntraVENous CONTINUOUS    heparin (porcine) injection 5,000 Units  5,000 Units SubCUTAneous Q8H    docusate sodium (COLACE) capsule 100 mg  100 mg Oral BID    oxyCODONE-acetaminophen (PERCOCET) 5-325 mg per tablet 1 Tab  1 Tab Oral Q4H PRN    piperacillin-tazobactam (ZOSYN) 4.5 g in 0.9% sodium chloride (MBP/ADV) 100 mL MBP ### EXTENDED 4 HOUR INFUSION ###   4.5 g IntraVENous Q8H    ELECTROLYTE REPLACEMENT PROTOCOL  1 Each Other PRN    atorvastatin (LIPITOR) tablet 20 mg  20 mg Oral QHS    gabapentin (NEURONTIN) capsule 400-800 mg  400-800 mg Oral TID PRN    ELECTROLYTE REPLACEMENT PROTOCOL  1 Each Other PRN    ELECTROLYTE REPLACEMENT PROTOCOL  1 Each Other PRN    ELECTROLYTE REPLACEMENT PROTOCOL  1 Each Other PRN    ELECTROLYTE REPLACEMENT PROTOCOL  1 Each Other PRN    heparin (porcine) pf 500 Units  500 Units InterCATHeter DAILY    ondansetron (ZOFRAN) injection 4 mg  4 mg IntraVENous Q4H PRN    pantoprazole (PROTONIX) granules for oral suspension 40 mg  40 mg Oral ACB    amLODIPine (NORVASC) tablet 5 mg  5 mg Oral DAILY    insulin lispro (HUMALOG) injection   SubCUTAneous AC&HS    glucose chewable tablet 16 g  16 g Oral PRN    glucagon (GLUCAGEN) injection 1 mg  1 mg IntraMUSCular PRN    dextrose (D50) infusion 12.5-25 g  25-50 mL IntraVENous PRN    sodium chloride (NS) flush 5-10 mL  5-10 mL IntraVENous PRN         Objective:     Visit Vitals    /88    Pulse 86    Temp 97.4 °F (36.3 °C)    Resp 16    Ht 5' 9\" (1.753 m)    Wt 100.1 kg (220 lb 10.9 oz)    SpO2 99%    BMI 32.59 kg/m2       Temp (24hrs), Av.4 °F (36.9 °C), Min:97.4 °F (36.3 °C), Max:99.1 °F (37.3 °C)      General: Well developed, well nourished 50 y.o.  male in no acute distress lying in be. HEENT: anicteric sclerae, no oral lesions  Cardio:  regular rhythm, S1, S2 normal, no murmur, click, rub or gallop. L Mediport non-tender  Chest: no wheezes or rales or rhonchi  Abdomen: soft, non-tender. Bowel sounds normal. No masses, no organomegaly, well-healed midline abdominal surgical wound  Back: no localized tenderness erythema or swelling in indicated region of pain. Extremities:  no redness or tenderness in the calves or thighs, no edema  Neuro: Grossly normal   SKIN: no rash    Labs: Results:   Chemistry Recent Labs      17   024176  17   0415   GLU  89  66*  217*   NA  140  140  139   K  3.6  3.7  3.9   CL  106  108  108   CO2  27  24  23   BUN  9  11  20*   CREA  0.91  1.04  1.72*   CA  8.1*  7.5*  7.0*   AGAP  7  8  8   BUCR  10*  11*  12   AP  232*  238*  223*   TP  6.5  5.9*  5.6*   ALB  2.2*  2.0*  2.0*   GLOB  4.3*  3.9  3.6   AGRAT  0.5*  0.5*  0.6*      CBC w/Diff Recent Labs      17   02417   0456  17   0415   WBC  8.8  9.3  10.6   RBC  3.71*  3.42*  3.49*   HGB  10.2*  9.3*  9.5*   HCT  30.1*  28.0*  28.8*   PLT  148  131*  120*   GRANS  80*  83*  78*   LYMPH  11*  8*  10*   EOS  1  1  1         cxr IMPRESSION: Increased very mild left basilar atelectasis with no other new  active disease. Microbiology Results  No results for input(s): Gian Oakes in the last 72 hours.     Andrew Perez MD  2017  [facility] AT THE [facility]  [phone]

## 2017-06-29 NOTE — PROGRESS NOTES
Progress Note      Patient: Marcia Maya               Sex: male          DOA: 6/25/2017       YOB: 1965      Age:  50 y.o.        LOS:  LOS: 3 days               Subjective:   Pt's creatinine is 1.04 .will order an mri of the lumbar spine with and without contrast to see if the lumbar pine is the source of the pt's fever . .discussed with dr Leeanna Pacheco      Objective:      Visit Vitals    /85    Pulse 88    Temp 99.1 °F (37.3 °C)    Resp 18    Ht 5' 9\" (1.753 m)    Wt 100.1 kg (220 lb 10.9 oz)    SpO2 98%    BMI 32.59 kg/m2       Physical Exam:  Pt is awake and is alert   Heart reg rate and rhythm   Lungs good breath sounds heard   Abdomen soft and no pain on palpation   Back  Discomfort on palpation of the lumbar spine   Neuro nonfocal      Lab/Data Reviewed:  CMP:   Lab Results   Component Value Date/Time     06/28/2017 04:56 AM    K 3.7 06/28/2017 04:56 AM     06/28/2017 04:56 AM    CO2 24 06/28/2017 04:56 AM    AGAP 8 06/28/2017 04:56 AM    GLU 66 (L) 06/28/2017 04:56 AM    BUN 11 06/28/2017 04:56 AM    CREA 1.04 06/28/2017 04:56 AM    GFRAA >60 06/28/2017 04:56 AM    GFRNA >60 06/28/2017 04:56 AM    CA 7.5 (L) 06/28/2017 04:56 AM    ALB 2.0 (L) 06/28/2017 04:56 AM    TP 5.9 (L) 06/28/2017 04:56 AM    GLOB 3.9 06/28/2017 04:56 AM    AGRAT 0.5 (L) 06/28/2017 04:56 AM    SGOT 73 (H) 06/28/2017 04:56 AM    ALT 47 06/28/2017 04:56 AM     CBC:   Lab Results   Component Value Date/Time    WBC 9.3 06/28/2017 04:56 AM    HGB 9.3 (L) 06/28/2017 04:56 AM    HCT 28.0 (L) 06/28/2017 04:56 AM     (L) 06/28/2017 04:56 AM           Assessment/Plan     Principal Problem:    Sepsis ([address] (6/25/2017)    Active Problems:    Hypokalemia (6/25/2017)      Lactic acidosis (6/25/2017)      Leukocytosis (6/25/2017)      MYRON (acute kidney injury) ([facility] (6/25/2017)        Plan:will order an mri of the lumbar spine with and without contrast

## 2017-06-29 NOTE — ROUTINE PROCESS
2227: Due meds given. Medicated for pain & for nausea per patient request. Will continue to monitor.

## 2017-06-29 NOTE — PROGRESS NOTES
[facility] Dosing Services     Pharmacy dosing Vancomycin IV empirically for treatment of sepsis of unknown etiology     Was on a regimen of 1.5g IV q24h    Day of Therapy: 4    Labs:   Level: 6 mcg/dl (26 hours post dose)  Bun/Serum Creatinine - 9 mg/dl / 0.91 mg/dl  CrCl - >100 ml/min  WBC - 8.8    Plan: Goal trough 15-20. Re-load patient with 2g IV once  Increase maintenance dose to 1.25g IV q12h  Ordered Vanco-trough on 7/1. Pharmacy to follow and will make changes to dose and/or frequency based on clinical status.

## 2017-06-30 LAB
GLUCOSE BLD STRIP.AUTO-MCNC: 125 MG/DL (ref 70–110)
GLUCOSE BLD STRIP.AUTO-MCNC: 52 MG/DL (ref 70–110)
GLUCOSE BLD STRIP.AUTO-MCNC: 57 MG/DL (ref 70–110)
GLUCOSE BLD STRIP.AUTO-MCNC: 70 MG/DL (ref 70–110)
GLUCOSE BLD STRIP.AUTO-MCNC: 76 MG/DL (ref 70–110)
GLUCOSE BLD STRIP.AUTO-MCNC: 91 MG/DL (ref 70–110)

## 2017-06-30 PROCEDURE — 65660000000 HC RM CCU STEPDOWN

## 2017-06-30 PROCEDURE — 74011250637 HC RX REV CODE- 250/637: Performed by: HOSPITALIST

## 2017-06-30 PROCEDURE — 74011250637 HC RX REV CODE- 250/637: Performed by: FAMILY MEDICINE

## 2017-06-30 PROCEDURE — 74011000258 HC RX REV CODE- 258: Performed by: HOSPITALIST

## 2017-06-30 PROCEDURE — 74011250636 HC RX REV CODE- 250/636: Performed by: HOSPITALIST

## 2017-06-30 PROCEDURE — 82962 GLUCOSE BLOOD TEST: CPT

## 2017-06-30 PROCEDURE — 74011250636 HC RX REV CODE- 250/636: Performed by: FAMILY MEDICINE

## 2017-06-30 RX ADMIN — PANTOPRAZOLE SODIUM 40 MG: 40 TABLET, DELAYED RELEASE ORAL at 09:33

## 2017-06-30 RX ADMIN — OXYCODONE HYDROCHLORIDE AND ACETAMINOPHEN 1 TABLET: 5; 325 TABLET ORAL at 14:03

## 2017-06-30 RX ADMIN — DEXTROSE MONOHYDRATE 12.5 G: 25 INJECTION, SOLUTION INTRAVENOUS at 06:51

## 2017-06-30 RX ADMIN — ONDANSETRON 4 MG: 2 INJECTION INTRAMUSCULAR; INTRAVENOUS at 09:38

## 2017-06-30 RX ADMIN — DOCUSATE SODIUM 100 MG: 100 CAPSULE, LIQUID FILLED ORAL at 09:33

## 2017-06-30 RX ADMIN — DOCUSATE SODIUM 100 MG: 100 CAPSULE, LIQUID FILLED ORAL at 18:40

## 2017-06-30 RX ADMIN — ONDANSETRON 4 MG: 2 INJECTION INTRAMUSCULAR; INTRAVENOUS at 18:40

## 2017-06-30 RX ADMIN — PIPERACILLIN SODIUM,TAZOBACTAM SODIUM 4.5 G: 4; .5 INJECTION, POWDER, FOR SOLUTION INTRAVENOUS at 03:51

## 2017-06-30 RX ADMIN — HEPARIN SODIUM 5000 UNITS: 5000 INJECTION, SOLUTION INTRAVENOUS; SUBCUTANEOUS at 18:48

## 2017-06-30 RX ADMIN — AMLODIPINE BESYLATE 5 MG: 5 TABLET ORAL at 09:39

## 2017-06-30 RX ADMIN — OXYCODONE HYDROCHLORIDE AND ACETAMINOPHEN 1 TABLET: 5; 325 TABLET ORAL at 03:52

## 2017-06-30 RX ADMIN — SODIUM CHLORIDE 1250 MG: 900 INJECTION, SOLUTION INTRAVENOUS at 11:00

## 2017-06-30 RX ADMIN — ONDANSETRON 4 MG: 2 INJECTION INTRAMUSCULAR; INTRAVENOUS at 23:20

## 2017-06-30 RX ADMIN — ATORVASTATIN CALCIUM 20 MG: 20 TABLET, FILM COATED ORAL at 23:18

## 2017-06-30 RX ADMIN — POTASSIUM CHLORIDE AND SODIUM CHLORIDE: 900; 150 INJECTION, SOLUTION INTRAVENOUS at 06:27

## 2017-06-30 RX ADMIN — Medication 500 UNITS: at 09:44

## 2017-06-30 RX ADMIN — ONDANSETRON 4 MG: 2 INJECTION INTRAMUSCULAR; INTRAVENOUS at 03:52

## 2017-06-30 RX ADMIN — HEPARIN SODIUM 5000 UNITS: 5000 INJECTION, SOLUTION INTRAVENOUS; SUBCUTANEOUS at 09:38

## 2017-06-30 RX ADMIN — OXYCODONE HYDROCHLORIDE AND ACETAMINOPHEN 1 TABLET: 5; 325 TABLET ORAL at 18:40

## 2017-06-30 RX ADMIN — OXYCODONE HYDROCHLORIDE AND ACETAMINOPHEN 1 TABLET: 5; 325 TABLET ORAL at 09:33

## 2017-06-30 RX ADMIN — ONDANSETRON 4 MG: 2 INJECTION INTRAMUSCULAR; INTRAVENOUS at 14:03

## 2017-06-30 RX ADMIN — OXYCODONE HYDROCHLORIDE AND ACETAMINOPHEN 1 TABLET: 5; 325 TABLET ORAL at 23:20

## 2017-06-30 NOTE — MANAGEMENT PLAN
Discharge Plan    Plan remains home when medically stable      Clarisse Saeed RN BSN  Outcomes Manager  Pager # [phone]

## 2017-06-30 NOTE — PROGRESS NOTES
Progress Note      Patient: Nae Alcantar               Sex: male          DOA: 6/25/2017       YOB: 1965      Age:  50 y.o.        LOS:  LOS: 4 days               Subjective:   Results of the mri of the lumbar spine seen. Pt has significant retroperitoneal lymphadenopathy and suspect that this is the source of his backpain . Wonder if necrosis of the lymph nodes  Is responsible for his fever . barry discuss with id .         Objective:      Visit Vitals    /83 (BP 1 Location: Left arm, BP Patient Position: Head of bed elevated (Comment degrees))    Pulse 83    Temp 98.5 °F (36.9 °C)    Resp 16    Ht 5' 9\" (1.753 m)    Wt 88.7 kg (195 lb 9.6 oz)    SpO2 98%    BMI 29.74 kg/m2       Physical Exam:  Pt is awake and is alert   Heart reg rate and rhythm   Lungs good  breath sounds heard   adomen soft and no pain on palpation   Back discomfort on palpation of the lumbar region   neurio nonfocal  Lab/Data Reviewed:  CMP:   Lab Results   Component Value Date/Time     06/29/2017 02:40 AM    K 3.6 06/29/2017 02:40 AM     06/29/2017 02:40 AM    CO2 27 06/29/2017 02:40 AM    AGAP 7 06/29/2017 02:40 AM    GLU 89 06/29/2017 02:40 AM    BUN 9 06/29/2017 02:40 AM    CREA 0.91 06/29/2017 02:40 AM    GFRAA >60 06/29/2017 02:40 AM    GFRNA >60 06/29/2017 02:40 AM    CA 8.1 (L) 06/29/2017 02:40 AM    ALB 2.2 (L) 06/29/2017 02:40 AM    TP 6.5 06/29/2017 02:40 AM    GLOB 4.3 (H) 06/29/2017 02:40 AM    AGRAT 0.5 (L) 06/29/2017 02:40 AM    SGOT 52 (H) 06/29/2017 02:40 AM    ALT 41 06/29/2017 02:40 AM     CBC:   Lab Results   Component Value Date/Time    WBC 8.8 06/29/2017 02:40 AM    HGB 10.2 (L) 06/29/2017 02:40 AM    HCT 30.1 (L) 06/29/2017 02:40 AM     06/29/2017 02:40 AM           Assessment/Plan     Principal Problem:    Sepsis ([address] (6/25/2017)    Active Problems:    Hypokalemia (6/25/2017)      Lactic acidosis (6/25/2017)      Leukocytosis (6/25/2017)      MYRON (acute kidney injury) ([facility] [address] (6/25/2017)  Retroperitoneal lymphadenopathy with necrosis of the lymph nodes       Plan: continue the antibiotics . will discuss with id .  We need to get the pt back to his hem onc  Physicians

## 2017-06-30 NOTE — PROGRESS NOTES
conducted a Follow up consultation and Spiritual Assessment for Fredi Mathur, who is a 50 y.o.,male. The  provided the following Interventions:  Continued the relationship of care and support. Listened empathically. Offered prayer and assurance of continued prayer on patients behalf. Chart reviewed. The following outcomes were achieved:  Patient expressed gratitude for pastoral care visit. Assessment:  There are no further spiritual or [employer]ism issues which require Spiritual Care Services interventions at this time. Plan:  Chaplains will continue to follow and will provide pastoral care on an as needed/requested basis.  recommends bedside caregivers page  on duty if patient shows signs of acute spiritual or emotional distress.      [address] [facility]   Staff [address] [facility]   [phone]

## 2017-06-30 NOTE — PROGRESS NOTES
Progress Note      Patient: Marcia Maya               Sex: male          DOA: 6/25/2017       YOB: 1965      Age:  50 y.o.        LOS:  LOS: 5 days               Subjective:   Discussed at length with the pt and his family the pt is now off antibiotics and will watch to see if he remains afebrile . Results of the mri were noted and discussed with the family . The plan is to dc the pt and have him fly to his hem onc physicians in [place]      Objective:      Visit Vitals    /89    Pulse 96    Temp 98.3 °F (36.8 °C)    Resp 20    Ht 5' 9\" (1.753 m)    Wt 88.7 kg (195 lb 9.6 oz)    SpO2 92%    BMI 29.74 kg/m2       Physical Exam:  Pt is awake and is alert and has not had any further fevers   hert reg rate and rhythm   Lungs good breath sounds heard   abdomen soft and no pain on palpation   Back pain on palpation of the lumbar region   Neuro nonfocal      Lab/Data Reviewed:  CMP: No results found for: NA, K, CL, CO2, AGAP, GLU, BUN, CREA, GFRAA, GFRNA, CA, MG, PHOS, ALB, TBIL, TP, ALB, GLOB, AGRAT, SGOT, ALT, GPT  CBC: No results found for: WBC, HGB, HGBEXT, HCT, HCTEXT, PLT, PLTEXT, HGBEXT, HCTEXT, PLTEXT        Assessment/Plan     Principal Problem:    Sepsis ([facility] [address] (6/25/2017)    Active Problems:    Hypokalemia (6/25/2017)      Lactic acidosis (6/25/2017)      Leukocytosis (6/25/2017)      MYRON (acute kidney injury) ([address] (6/25/2017)        Plan:will watch the pt off antibiotics for another day or so to see if he becomes febrile .  Will plan on sending him up to [place]

## 2017-06-30 NOTE — ROUTINE PROCESS
6038 Bedside and Verbal shift change report given to [facility] (oncoming nurse) by Elida Begum (offgoing nurse). Report included the following information SBAR, Kardex, Intake/Output, MAR, Recent Results and Cardiac Rhythm ST with BBB.     0845 Pt resting in bed. Complaints of pain have been addressed. Will continue to monitor. 1000 Pt resting in bed. Complaints of pain have been addressed. Will continue to monitor. 1220 Report given to [facility]. Report consisted of situation, background, assessment, and recommendation.

## 2017-06-30 NOTE — PROGRESS NOTES
Bedside shift report received from [address] [place] [address] Drive: AOX4, on room air,resting in bed, IV ATB infusing well; shift assessment done; pain med given as requested- see flowsheet    0030: sleeping comfortably; needs within reach    0200: quietly sleeping; no apparent distress noted    0400: awake, assisted with needs    0631: awake, sitting at bedside, sweating profusely; denies any dizziness; blood sugar obtained= 52; 2 cups applejuice given; will recheck blood sugar    0646: BS rechecked= 57; D50 12.5 g given IV; will recheck BS; denies feeling dizzy    0701: BS rechecked= 91; verbalizes feeling better    0740: Bedside and Verbal shift change report given to Rachel Li (oncoming nurse) by Alda Aaron RN (offgoing nurse). Report included the following information SBAR, Kardex, Intake/Output, Recent Results and Cardiac Rhythm NSR.

## 2017-06-30 NOTE — PROGRESS NOTES
Infectious Disease Follow-up       We are available over weekend and plan to f/u Monday. Dr Radha Shore is on call and can be reached at [phone] if any problem or question for ID prior. Admit Date: 6/25/2017    Current abx Prior abx   None  Levofloxacin 6/25 - 1,Vancomycin, Zosyn 6/25-5       ASSESSMENT - > REC:      Fever  - unclear source. - with rigors during IV abx infusion 6/25 must consider mediport infection (but no h/o fevers, chills sweats PTA until day PTA)  - w/ ?worsening back pain would consider Lumbar discitis / osteomyelitis but doubt  - doubt constipation x 1 week caused fever in this case (but occasionally can cause low grade fever)  - blcx 6/25 NGTD x 2, urcx ng  - Tm past 24h still had Tmax 99.8 overnight but may be trending down -> on empiric Vancomycin, zosyn but as cx neg and MRI without obvious concern for infection- will stop Abx       Worsening back pain  - had intermittent Lumbar pain from old injuries PTA but steadily worsening since 3 weeks PTA  - alk phos elevated 238 with minimally elevated AST, nl ALT- > bone alk phos?  -CRP 13 no ESR  - no bacteremia but need to r/o OM/discitis vs metastasis ->MRI Lumbar spine reviewed and concerning for DJD and no discitis or OM but progression of extensive retroperitoneal metastatic adenopathy   MYRON  - from N/V, poor intake  - Cr 2.6 on 6/25 (0.9 on 6/22)  - resolved Cr 0.9 today (6/29) -> per IM   Stage III adenoCa colon   - s/p left hemicolectomy September 2018  - chemotherapy x 8 cycles, last May 30  - not neutropenic  - worsening   DM     HTN     Sleep apnea        MICROBIOLOGY:   6/25 blcx x 2 ngtd   uctx ng                    LINES AND CATHETERS:   mediport  piv      Active Hospital Problems    Diagnosis Date Noted    Sepsis ([address] 06/25/2017    Hypokalemia 06/25/2017    Lactic acidosis 06/25/2017    Leukocytosis 06/25/2017    MYRON (acute kidney injury) ([facility] 06/25/2017       Subjective: Interval notes reviewed.  Back pain persists but says under control. MRI results d/w him and no obvious discitis or OM. Pt says want to fly to [place] to see his cancer doctor there.      Current Facility-Administered Medications   Medication Dose Route Frequency    vancomycin (VANCOCIN) 1,250 mg in 0.9% sodium chloride 250 mL IVPB  1,250 mg IntraVENous Q12H    [START ON 7/1/2017] VANCOMYCIN INFORMATION NOTE   Other ONCE    pantoprazole (PROTONIX) tablet 40 mg  40 mg Oral ACB    insulin detemir (LEVEMIR) injection 60 Units  60 Units SubCUTAneous QHS    0.9% sodium chloride with KCl 20 mEq/L infusion   IntraVENous CONTINUOUS    heparin (porcine) injection 5,000 Units  5,000 Units SubCUTAneous Q8H    docusate sodium (COLACE) capsule 100 mg  100 mg Oral BID    oxyCODONE-acetaminophen (PERCOCET) 5-325 mg per tablet 1 Tab  1 Tab Oral Q4H PRN    piperacillin-tazobactam (ZOSYN) 4.5 g in 0.9% sodium chloride (MBP/ADV) 100 mL MBP ### EXTENDED 4 HOUR INFUSION ###   4.5 g IntraVENous Q8H    ELECTROLYTE REPLACEMENT PROTOCOL  1 Each Other PRN    atorvastatin (LIPITOR) tablet 20 mg  20 mg Oral QHS    gabapentin (NEURONTIN) capsule 400-800 mg  400-800 mg Oral TID PRN    ELECTROLYTE REPLACEMENT PROTOCOL  1 Each Other PRN    ELECTROLYTE REPLACEMENT PROTOCOL  1 Each Other PRN    ELECTROLYTE REPLACEMENT PROTOCOL  1 Each Other PRN    ELECTROLYTE REPLACEMENT PROTOCOL  1 Each Other PRN    heparin (porcine) pf 500 Units  500 Units InterCATHeter DAILY    ondansetron (ZOFRAN) injection 4 mg  4 mg IntraVENous Q4H PRN    amLODIPine (NORVASC) tablet 5 mg  5 mg Oral DAILY    insulin lispro (HUMALOG) injection   SubCUTAneous AC&HS    glucose chewable tablet 16 g  16 g Oral PRN    glucagon (GLUCAGEN) injection 1 mg  1 mg IntraMUSCular PRN    dextrose (D50) infusion 12.5-25 g  25-50 mL IntraVENous PRN    sodium chloride (NS) flush 5-10 mL  5-10 mL IntraVENous PRN         Objective:     Visit Vitals    /85    Pulse 82    Temp 97.9 °F (36.6 °C)    Resp 18  Ht 5' 9\" (1.753 m)    Wt 88.7 kg (195 lb 9.6 oz)    SpO2 (!) 17%    BMI 29.74 kg/m2       Temp (24hrs), Av °F (36.7 °C), Min:97.4 °F (36.3 °C), Max:98.6 °F (37 °C)      General: Well developed, well nourished 50 y.o.  male in no acute distress lying in be. HEENT: anicteric sclerae, no oral lesions  Cardio:  regular rhythm, S1, S2 normal, no murmur, click, rub or gallop. L Mediport non-tender  Chest: no wheezes or rales or rhonchi  Abdomen: soft, non-tender. Bowel sounds normal. No masses, no organomegaly, well-healed midline abdominal surgical wound  Back: no localized tenderness erythema or swelling in indicated region of pain. Extremities:  no redness or tenderness in the calves or thighs, no edema  Neuro: Grossly normal   SKIN: no rash    Labs: Results:   Chemistry Recent Labs      17   0240  17   0456   GLU  89  66*   NA  140  140   K  3.6  3.7   CL  106  108   CO2  27  24   BUN  9  11   CREA  0.91  1.04   CA  8.1*  7.5*   AGAP  7  8   BUCR  10*  11*   AP  232*  238*   TP  6.5  5.9*   ALB  2.2*  2.0*   GLOB  4.3*  3.9   AGRAT  0.5*  0.5*      CBC w/Diff Recent Labs      17   0240  17   0456   WBC  8.8  9.3   RBC  3.71*  3.42*   HGB  10.2*  9.3*   HCT  30.1*  28.0*   PLT  148  131*   GRANS  80*  83*   LYMPH  11*  8*   EOS  1  1         cxr IMPRESSION: Increased very mild left basilar atelectasis with no other new  active disease. Microbiology Results  No results for input(s): Lysle Confucianist in the last 72 hours.     Winter Bailey MD  2017  [facility] AT THE [facility]  [phone]

## 2017-06-30 NOTE — PROGRESS NOTES
Assumed care; Pt resting in bed; no distress noted; Pt denies pain; bed in low position; Side rails up x 2; Call light and personal belonging within reach. Will continue to monitor. 1230: MRI screen completed. MRI tech notified. Pt to be called at 1330. Pt notified. 1315: Off floor to MRI  1430: Returned to Unit. No distress noted. 1930: Spouse at nursing station. Requesting to speak to Physician on Mri result. Dr Анна Romeo notified.

## 2017-07-01 VITALS
TEMPERATURE: 98.6 F | RESPIRATION RATE: 18 BRPM | WEIGHT: 189.2 LBS | OXYGEN SATURATION: 97 % | DIASTOLIC BLOOD PRESSURE: 100 MMHG | HEIGHT: 69 IN | SYSTOLIC BLOOD PRESSURE: 158 MMHG | BODY MASS INDEX: 28.02 KG/M2 | HEART RATE: 103 BPM

## 2017-07-01 LAB
ALBUMIN SERPL BCP-MCNC: 2.4 G/DL (ref 3.4–5)
ALBUMIN/GLOB SERPL: 0.5 {RATIO} (ref 0.8–1.7)
ALP SERPL-CCNC: 329 U/L (ref 45–117)
ALT SERPL-CCNC: 34 U/L (ref 16–61)
ANION GAP BLD CALC-SCNC: 13 MMOL/L (ref 3–18)
AST SERPL W P-5'-P-CCNC: 49 U/L (ref 15–37)
BACTERIA SPEC CULT: NORMAL
BACTERIA SPEC CULT: NORMAL
BASOPHILS # BLD AUTO: 0 K/UL (ref 0–0.06)
BASOPHILS # BLD: 0 % (ref 0–2)
BILIRUB SERPL-MCNC: 0.7 MG/DL (ref 0.2–1)
BUN SERPL-MCNC: 8 MG/DL (ref 7–18)
BUN/CREAT SERPL: 10 (ref 12–20)
CALCIUM SERPL-MCNC: 8.5 MG/DL (ref 8.5–10.1)
CHLORIDE SERPL-SCNC: 102 MMOL/L (ref 100–108)
CO2 SERPL-SCNC: 25 MMOL/L (ref 21–32)
CREAT SERPL-MCNC: 0.84 MG/DL (ref 0.6–1.3)
DATE LAST DOSE: 0
DIFFERENTIAL METHOD BLD: ABNORMAL
EOSINOPHIL # BLD: 0.1 K/UL (ref 0–0.4)
EOSINOPHIL NFR BLD: 1 % (ref 0–5)
ERYTHROCYTE [DISTWIDTH] IN BLOOD BY AUTOMATED COUNT: 16.4 % (ref 11.6–14.5)
GLOBULIN SER CALC-MCNC: 4.5 G/DL (ref 2–4)
GLUCOSE BLD STRIP.AUTO-MCNC: 110 MG/DL (ref 70–110)
GLUCOSE BLD STRIP.AUTO-MCNC: 246 MG/DL (ref 70–110)
GLUCOSE SERPL-MCNC: 81 MG/DL (ref 74–99)
HCT VFR BLD AUTO: 31.2 % (ref 36–48)
HGB BLD-MCNC: 10.5 G/DL (ref 13–16)
LYMPHOCYTES # BLD AUTO: 15 % (ref 21–52)
LYMPHOCYTES # BLD: 1.2 K/UL (ref 0.9–3.6)
MCH RBC QN AUTO: 27.7 PG (ref 24–34)
MCHC RBC AUTO-ENTMCNC: 33.7 G/DL (ref 31–37)
MCV RBC AUTO: 82.3 FL (ref 74–97)
MONOCYTES # BLD: 0.9 K/UL (ref 0.05–1.2)
MONOCYTES NFR BLD AUTO: 12 % (ref 3–10)
NEUTS SEG # BLD: 5.5 K/UL (ref 1.8–8)
NEUTS SEG NFR BLD AUTO: 72 % (ref 40–73)
PLATELET # BLD AUTO: 207 K/UL (ref 135–420)
PMV BLD AUTO: 9.7 FL (ref 9.2–11.8)
POTASSIUM SERPL-SCNC: 3.4 MMOL/L (ref 3.5–5.5)
PROT SERPL-MCNC: 6.9 G/DL (ref 6.4–8.2)
RBC # BLD AUTO: 3.79 M/UL (ref 4.7–5.5)
REPORTED DOSE,DOSE: ABNORMAL UNITS
REPORTED DOSE/TIME,TMG: 0
SERVICE CMNT-IMP: NORMAL
SERVICE CMNT-IMP: NORMAL
SODIUM SERPL-SCNC: 140 MMOL/L (ref 136–145)
VANCOMYCIN TROUGH SERPL-MCNC: 7.5 UG/ML (ref 10–20)
WBC # BLD AUTO: 7.7 K/UL (ref 4.6–13.2)

## 2017-07-01 PROCEDURE — 80053 COMPREHEN METABOLIC PANEL: CPT | Performed by: HOSPITALIST

## 2017-07-01 PROCEDURE — 74011250637 HC RX REV CODE- 250/637: Performed by: HOSPITALIST

## 2017-07-01 PROCEDURE — 36415 COLL VENOUS BLD VENIPUNCTURE: CPT | Performed by: HOSPITALIST

## 2017-07-01 PROCEDURE — 74011250637 HC RX REV CODE- 250/637: Performed by: FAMILY MEDICINE

## 2017-07-01 PROCEDURE — 80202 ASSAY OF VANCOMYCIN: CPT | Performed by: HOSPITALIST

## 2017-07-01 PROCEDURE — 85025 COMPLETE CBC W/AUTO DIFF WBC: CPT | Performed by: HOSPITALIST

## 2017-07-01 PROCEDURE — 74011250636 HC RX REV CODE- 250/636: Performed by: HOSPITALIST

## 2017-07-01 PROCEDURE — 74011250636 HC RX REV CODE- 250/636: Performed by: FAMILY MEDICINE

## 2017-07-01 PROCEDURE — 74011636637 HC RX REV CODE- 636/637: Performed by: HOSPITALIST

## 2017-07-01 PROCEDURE — 82962 GLUCOSE BLOOD TEST: CPT

## 2017-07-01 RX ORDER — LOSARTAN POTASSIUM 50 MG/1
100 TABLET ORAL DAILY
Status: DISCONTINUED | OUTPATIENT
Start: 2017-07-01 | End: 2017-07-01 | Stop reason: HOSPADM

## 2017-07-01 RX ORDER — HEPARIN 100 UNIT/ML
500 SYRINGE INTRAVENOUS AS NEEDED
Status: DISCONTINUED | OUTPATIENT
Start: 2017-07-01 | End: 2017-07-01 | Stop reason: HOSPADM

## 2017-07-01 RX ADMIN — HEPARIN SODIUM 5000 UNITS: 5000 INJECTION, SOLUTION INTRAVENOUS; SUBCUTANEOUS at 09:14

## 2017-07-01 RX ADMIN — Medication 500 UNITS: at 09:14

## 2017-07-01 RX ADMIN — PANTOPRAZOLE SODIUM 40 MG: 40 TABLET, DELAYED RELEASE ORAL at 08:02

## 2017-07-01 RX ADMIN — Medication 500 UNITS: at 16:12

## 2017-07-01 RX ADMIN — OXYCODONE HYDROCHLORIDE AND ACETAMINOPHEN 1 TABLET: 5; 325 TABLET ORAL at 08:02

## 2017-07-01 RX ADMIN — LOSARTAN POTASSIUM 100 MG: 50 TABLET, FILM COATED ORAL at 11:51

## 2017-07-01 RX ADMIN — Medication 10 ML: at 08:02

## 2017-07-01 RX ADMIN — ONDANSETRON 4 MG: 2 INJECTION INTRAMUSCULAR; INTRAVENOUS at 13:26

## 2017-07-01 RX ADMIN — AMLODIPINE BESYLATE 5 MG: 5 TABLET ORAL at 09:13

## 2017-07-01 RX ADMIN — OXYCODONE HYDROCHLORIDE AND ACETAMINOPHEN 1 TABLET: 5; 325 TABLET ORAL at 03:32

## 2017-07-01 RX ADMIN — HEPARIN SODIUM 5000 UNITS: 5000 INJECTION, SOLUTION INTRAVENOUS; SUBCUTANEOUS at 01:30

## 2017-07-01 RX ADMIN — ONDANSETRON 4 MG: 2 INJECTION INTRAMUSCULAR; INTRAVENOUS at 08:02

## 2017-07-01 RX ADMIN — ONDANSETRON 4 MG: 2 INJECTION INTRAMUSCULAR; INTRAVENOUS at 03:32

## 2017-07-01 RX ADMIN — OXYCODONE HYDROCHLORIDE AND ACETAMINOPHEN 1 TABLET: 5; 325 TABLET ORAL at 13:27

## 2017-07-01 RX ADMIN — INSULIN LISPRO 6 UNITS: 100 INJECTION, SOLUTION INTRAVENOUS; SUBCUTANEOUS at 13:03

## 2017-07-01 RX ADMIN — DOCUSATE SODIUM 100 MG: 100 CAPSULE, LIQUID FILLED ORAL at 09:13

## 2017-07-01 NOTE — DISCHARGE INSTRUCTIONS
DISCHARGE SUMMARY from Nurse    The following personal items are in your possession at time of discharge:    Dental Appliances: None  Visual Aid: None     Home Medications: None  Jewelry: None  Clothing: At bedside, Belt, Pants, Socks, Undergarments  Other Valuables: Cell Phone             PATIENT INSTRUCTIONS:    After general anesthesia or intravenous sedation, for 24 hours or while taking prescription Narcotics:  · Limit your activities  · Do not drive and operate hazardous machinery  · Do not make important personal or business decisions  · Do  not drink alcoholic beverages  · If you have not urinated within 8 hours after discharge, please contact your surgeon on call. Report the following to your surgeon:  · Excessive pain, swelling, redness or odor of or around the surgical area  · Temperature over 100.5  · Nausea and vomiting lasting longer than 4 hours or if unable to take medications  · Any signs of decreased circulation or nerve impairment to extremity: change in color, persistent  numbness, tingling, coldness or increase pain  · Any questions        What to do at Home:  Recommended activity: Activity as tolerated. If you experience any of the following symptoms fever, shortness of breath or dizziness, please follow up with Emergency Unit or Primary Care Provider      *  Please give a list of your current medications to your Primary Care Provider. *  Please update this list whenever your medications are discontinued, doses are      changed, or new medications (including over-the-counter products) are added. *  Please carry medication information at all times in case of emergency situations. These are general instructions for a healthy lifestyle:    No smoking/ No tobacco products/ Avoid exposure to second hand smoke    Surgeon General's Warning:  Quitting smoking now greatly reduces serious risk to your health.     Obesity, smoking, and sedentary lifestyle greatly increases your risk for illness    A healthy diet, regular physical exercise & weight monitoring are important for maintaining a healthy lifestyle    You may be retaining fluid if you have a history of heart failure or if you experience any of the following symptoms:  Weight gain of 3 pounds or more overnight or 5 pounds in a week, increased swelling in our hands or feet or shortness of breath while lying flat in bed. Please call your doctor as soon as you notice any of these symptoms; do not wait until your next office visit. Recognize signs and symptoms of STROKE:    F-face looks uneven    A-arms unable to move or move unevenly    S-speech slurred or non-existent    T-time-call 911 as soon as signs and symptoms begin-DO NOT go       Back to bed or wait to see if you get better-TIME IS BRAIN. Warning Signs of HEART ATTACK     Call 911 if you have these symptoms:   Chest discomfort. Most heart attacks involve discomfort in the center of the chest that lasts more than a few minutes, or that goes away and comes back. It can feel like uncomfortable pressure, squeezing, fullness, or pain.  Discomfort in other areas of the upper body. Symptoms can include pain or discomfort in one or both arms, the back, neck, jaw, or stomach.  Shortness of breath with or without chest discomfort.  Other signs may include breaking out in a cold sweat, nausea, or lightheadedness. Don't wait more than five minutes to call 911 - MINUTES MATTER! Fast action can save your life. Calling 911 is almost always the fastest way to get lifesaving treatment. Emergency Medical Services staff can begin treatment when they arrive -- up to an hour sooner than if someone gets to the hospital by car. The discharge information has been reviewed with the patient. The patient verbalized understanding. Discharge medications reviewed with the patient and appropriate educational materials and side effects teaching were provided.               Rajesh Activation    Thank you for enrolling in [address] Please follow the instructions below to securely access your online medical record. [facility] allows you to send messages to your doctor, view your test results, renew your prescriptions, schedule appointments, and more. How Do I Sign Up? 1. In your internet browser, go to [email or web address]. 2. Click on the First Time User? Click Here link in the Sign In box. You will see the New Member Sign Up page. 3. Enter your [employer] Access Code exactly as it appears below. You will not need to use this code after youve completed the sign-up process. If you do not sign up before the expiration date, you must request a new code. [facility] Access Code: Activation code not generated  Current [facility] Status: Active     4. Enter the last four digits of your Social Security Number (xxxx) and Date of Birth (mm/dd/yyyy) as indicated and click Submit. You will be taken to the next sign-up page. 5. Create a [facility] ID. This will be your [facility] login ID and cannot be changed, so think of one that is secure and easy to remember. 6. Create a [facility] password. You can change your password at any time. 7. Enter your Password Reset Question and Answer. This can be used at a later time if you forget your password. 8. Enter your e-mail address. You will receive e-mail notification when new information is available in [address] 9. Click Sign Up. You can now view your medical record. Additional Information    Remember, [facility] is NOT to be used for urgent needs. For medical emergencies, dial 911. Now available from your iPhone and Android!     Patient armband removed and shredded

## 2017-07-01 NOTE — ROUTINE PROCESS
Bedside and Verbal shift change report given to [address] [place] [address] Jonhny (oncoming nurse) by Jaleesa Celestin (offgoing nurse). Report included the following information SBAR, Kardex, Intake/Output, MAR and Recent Results. 0945 Pt resting in bed. Complaints of pain have been addressed. Will continue to monitor. 1100 Pt resting in bed. Complaints of pain have been addressed. Will continue to monitor. 1300   Pt resting in bed. Complaints of pain have been addressed. Will continue to monitor. 1830 Pt states that he can barely eat his dinner because he is very nauseous. Zofran given. 200 Paged Dr. Lloyd because pt states that he does not want fluids anymore because he believes they are attributing to his nausea along with the antibiotics. [address] Per Dr. Lloyd fluids can be stopped. Per the [facility] all IV antibiotics have been discontinued. Fluids diconnected from pt and stopped on the [facility]. Bedside and Verbal shift change report given to Joselito Wheatley (oncoming nurse) by [address] [place] [address] Johnny (offgoing nurse). Report included the following information SBAR, Kardex, Intake/Output, MAR and Recent Results.

## 2017-07-01 NOTE — PROGRESS NOTES
1942 Bedside and Verbal shift change report given to Melecio Tracy RN (oncoming nurse) by Silvia Chirinos (offgoing nurse). Report included the following information SBAR, Kardex, MAR and Recent Results. down     2024 shift assessment done,no signs of distress noted     2318 due medications given,prn pain med and zofran given,well tolerated     0030 shift reassessment done,no changes in previous assessment,activity welll tolerated    0332 prn pain med and zofran given      0445 Shift reassessment done,no signs of distress noted     0728 Bedside and Verbal shift change report given to Allison Kearns (oncoming nurse) by  Melecio Tracy RN (offgoing nurse).  Report included the following information SBAR, Kardex, [facility] and Recent Results

## 2017-07-01 NOTE — DISCHARGE SUMMARY
Discharge Summary    Patient: Crow Jacobo               Sex: male          DOA: 6/25/2017         YOB: 1965      Age:  50 y.o.        LOS:  LOS: 6 days                Admit Date: 6/25/2017    Discharge Date: 7/1/2017    Admission Diagnoses: Sepsis ([facility]  Sepsis ([facility]  MYRON (acute kidney injury) ([facility]  Hypokalemia  Lactic acidosis  Leukocytosis    Discharge Diagnoses:    Problem List as of 7/1/2017  Date Reviewed: 8/17/2016          Codes Class Noted - Resolved    * (Principal)Sepsis ([facility] ICD-10-CM: A41.9  ICD-9-CM: 038.9, 995.91  6/25/2017 - Present        Hypokalemia ICD-10-CM: E87.6  ICD-9-CM: 276.8  6/25/2017 - Present        Lactic acidosis ICD-10-CM: E87.2  ICD-9-CM: 276.2  6/25/2017 - Present        Leukocytosis ICD-10-CM: D72.829  ICD-9-CM: 288.60  6/25/2017 - Present        MYRON (acute kidney injury) ([facility] ICD-10-CM: N17.9  ICD-9-CM: 584.9  6/25/2017 - Present        Colon carcinoma ([facility] ICD-10-CM: C18.9  ICD-9-CM: 153.9  8/17/2016 - Present        Anemia ICD-10-CM: D64.9  ICD-9-CM: 285.9  8/8/2016 - Present        GI bleeding ICD-10-CM: K92.2  ICD-9-CM: 578.9  8/8/2016 - Present        Diabetes ([facility] (Chronic) ICD-10-CM: E11.9  ICD-9-CM: 250.00  8/8/2016 - Present        Hypertension (Chronic) ICD-10-CM: I10  ICD-9-CM: 401.9  8/8/2016 - Present        Sleep apnea (Chronic) ICD-10-CM: G47.30  ICD-9-CM: 780.57  8/8/2016 - Present        Other and unspecified noninfectious gastroenteritis and colitis ICD-10-CM: K52.9  ICD-9-CM: 558.9  8/17/2013 - Present        Renal insufficiency ICD-10-CM: N28.9  ICD-9-CM: 593.9  8/17/2013 - Present        Hyperglycemia ICD-10-CM: R73.9  ICD-9-CM: 790.29  8/17/2013 - Present        DM (diabetes mellitus) ([facility] ICD-10-CM: E11.9  ICD-9-CM: 250.00  8/17/2013 - Present        Low magnesium levels ICD-10-CM: R79.0  ICD-9-CM: 790.6  8/17/2013 - Present        Nausea with vomiting ICD-10-CM: R11.2  ICD-9-CM: 787.01  8/16/2013 - Present        Rash ICD-10-CM: R21  ICD-9-CM: 782.1  8/16/2013 - Present        RESOLVED: Sepsis ([facility] ICD-10-CM: A41.9  ICD-9-CM: 995.91  8/16/2013 - 8/17/2013              Discharge Condition:  Improved    Hospital Course: pt is a 50year old male who has a diagnosis of poorly differentiated carcinoma of the colon . He is taken care of by the [facility] in [place] . The pt said he has had 8 rounds of chemitherapy . He presented to the er at [facility] with fevers of 102.5 at home . He was begun on the sepsis protocol and was admitted . He was seen by infectious disease and the pt was begun on empric iv vancomycin and zosyn and blood cultures were drawn before the antibiotics were begun . Among the possibilities which were considered were a mediport infection as he apparently had  Rigors  During an antibiotic  Infusion on 6/25 /17 . the pt complained of back pain and a lumbar infection was to be considered . This pain was increassing in intensity in the past 3 weeks . The pt 's temerature slowly subsided and he became afebrile . However the pt's lumbar osito n increased in intensity his creatinine was 2.59  And thjis was too high for an mri with contrast . Eventually with iv fluids the creatinine dropped down to 0.91 and he was then sent for an mri of the lumbar spins with contrast . This showed no evidence of osteomyelitis  But he had progression of fairly extensive  Retroperitoneal metastatic lymphadenopathy since the last  Ct scan on 8/16 . .there was no significant stenosis or impingement seen in the lumbar spine mri . thept 's cultures were all negative and he looked well except for his back pain . Infectious disease then decided to take him off the antibiotics . He remained afebrile and at that point it was decided to send him home and he would make his own way to [place] .              Consults:    Treatment Team: Attending Provider: Marcus Arellano MD; Consulting Provider: Manish Isaac MD; Care Manager: Nakita Hernandez; Utilization Review: Mable Danielson RN; Utilization Review: Mor Livingston RN    Significant Diagnostic Studies:     Discharge Medications:     Current Discharge Medication List      CONTINUE these medications which have NOT CHANGED    Details   cholecalciferol, VITAMIN D3, (VITAMIN D3) 5,000 unit tab tablet Take 5,000 Units by mouth daily. Indications: PREVENTION OF VITAMIN D DEFICIENCY      pantoprazole (PROTONIX) 40 mg tablet Take 40 mg by mouth daily. oxyCODONE-acetaminophen (PERCOCET) 5-325 mg per tablet Take 1 Tab by mouth every four (4) hours as needed for Pain. Max Daily Amount: 6 Tabs. Qty: 20 Tab, Refills: 0      docusate sodium (COLACE) 100 mg capsule Take 1 Cap by mouth two (2) times a day for 10 days. Qty: 20 Cap, Refills: 0      losartan (COZAAR) 100 mg tablet Take 100 mg by mouth daily. insulin aspart (NOVOLOG) 100 unit/mL injection by SubCUTAneous route. insulin detemir (LEVEMIR) 100 unit/mL injection by SubCUTAneous route nightly. cyclobenzaprine (FLEXERIL) 10 mg tablet       gabapentin (NEURONTIN) 400 mg capsule Take 400 mg by mouth as needed. atorvastatin (LIPITOR) 10 mg tablet Take 40 mg by mouth daily. erythromycin 500 mg tablet Take one tab at 3 PM, 5 PM, and 9 PM the day before surgery. Qty: 3 Tab, Refills: 0    Associated Diagnoses: Colon carcinoma ([address] Iron deficiency anemia due to chronic blood loss      neomycin (MYCIFRADIN) 500 mg tablet Take two tabs at 3 PM, 5 PM, and 9 PM the day before surgery. Qty: 6 Tab, Refills: 0    Associated Diagnoses: Colon carcinoma ([address] Iron deficiency anemia due to chronic blood loss      PEG 3350-Electrolytes (COLYTE) 240-22.72-6.72 -5.84 gram solution Take as directed  Qty: 4 L, Refills: 0    Associated Diagnoses: Colon carcinoma ([address] Iron deficiency anemia due to chronic blood loss      canagliflozin (INVOKANA) 300 mg tablet Take 300 mg by mouth Daily (before breakfast).       amLODIPine (NORVASC) 10 mg tablet Take 5 mg by mouth daily. STOP taking these medications       promethazine (PHENERGAN) 25 mg suppository Comments:   Reason for Stopping:               Activity:as tolerated    Diet: diabetic diet    Wound Care: none    Follow-up: pt is to see his hem onc physician in [place] .

## 2017-07-01 NOTE — PROGRESS NOTES
7090 -- Bedside and Verbal shift change report given to Myranda Wells (oncoming nurse) [facility] Report included the following information SBAR, Kardex, Procedure Summary, Intake/Output, MAR and Recent Results. Daily weigh completed.       0913 -- AM medications administered, pt tolerated with ease, will continue to monitor. 7715 -- Notified by CNA elevated blood pressure 156/101, medication administered within hour will reassess at one hour basilio. 1050 -- Blood pressure elevated 157/98,  will continue to monitor. 1120 -- Contacted MD Chan Courser on unit, pt's second blood pressure medication added to regimen, will administer medication and reassess. Advised MD doyle requested note for pt to be able to fly. 1217 -- Shift reassessment, pt condition unchanged, will continue to monitor.       1600 -- Pt is discharged.

## 2017-07-02 ENCOUNTER — HOSPITAL ENCOUNTER (EMERGENCY)
Age: 52
Discharge: HOME | End: 2017-07-02
Attending: EMERGENCY MEDICINE | Admitting: EMERGENCY MEDICINE
Payer: COMMERCIAL

## 2017-07-02 VITALS
WEIGHT: 195 LBS | BODY MASS INDEX: 28.88 KG/M2 | HEIGHT: 69 IN | RESPIRATION RATE: 15 BRPM | HEART RATE: 75 BPM | TEMPERATURE: 96.9 F | DIASTOLIC BLOOD PRESSURE: 71 MMHG | OXYGEN SATURATION: 97 % | SYSTOLIC BLOOD PRESSURE: 140 MMHG

## 2017-07-02 DIAGNOSIS — E16.2 HYPOGLYCEMIA: Primary | ICD-10-CM

## 2017-07-02 DIAGNOSIS — E87.6 HYPOKALEMIA: ICD-10-CM

## 2017-07-02 LAB
ANION GAP BLD CALC-SCNC: 9 MMOL/L (ref 3–18)
APPEARANCE UR: CLEAR
BACTERIA URNS QL MICRO: NEGATIVE /HPF
BASOPHILS # BLD AUTO: 0 K/UL (ref 0–0.06)
BASOPHILS # BLD: 0 % (ref 0–2)
BILIRUB UR QL: NEGATIVE
BUN SERPL-MCNC: 11 MG/DL (ref 7–18)
BUN/CREAT SERPL: 10 (ref 12–20)
CALCIUM SERPL-MCNC: 8.5 MG/DL (ref 8.5–10.1)
CHLORIDE SERPL-SCNC: 102 MMOL/L (ref 100–108)
CO2 SERPL-SCNC: 28 MMOL/L (ref 21–32)
COLOR UR: YELLOW
CREAT SERPL-MCNC: 1.06 MG/DL (ref 0.6–1.3)
DIFFERENTIAL METHOD BLD: ABNORMAL
EOSINOPHIL # BLD: 0.2 K/UL (ref 0–0.4)
EOSINOPHIL NFR BLD: 2 % (ref 0–5)
EPITH CASTS URNS QL MICRO: NORMAL /LPF (ref 0–5)
ERYTHROCYTE [DISTWIDTH] IN BLOOD BY AUTOMATED COUNT: 16.2 % (ref 11.6–14.5)
GLUCOSE BLD STRIP.AUTO-MCNC: 120 MG/DL (ref 70–110)
GLUCOSE BLD STRIP.AUTO-MCNC: 128 MG/DL (ref 70–110)
GLUCOSE BLD STRIP.AUTO-MCNC: 94 MG/DL (ref 70–110)
GLUCOSE SERPL-MCNC: 86 MG/DL (ref 74–99)
GLUCOSE UR STRIP.AUTO-MCNC: 500 MG/DL
HCT VFR BLD AUTO: 29.7 % (ref 36–48)
HGB BLD-MCNC: 10 G/DL (ref 13–16)
HGB UR QL STRIP: NEGATIVE
KETONES UR QL STRIP.AUTO: NEGATIVE MG/DL
LEUKOCYTE ESTERASE UR QL STRIP.AUTO: NEGATIVE
LYMPHOCYTES # BLD AUTO: 15 % (ref 21–52)
LYMPHOCYTES # BLD: 1.1 K/UL (ref 0.9–3.6)
MAGNESIUM SERPL-MCNC: 1.9 MG/DL (ref 1.6–2.6)
MCH RBC QN AUTO: 27.4 PG (ref 24–34)
MCHC RBC AUTO-ENTMCNC: 33.7 G/DL (ref 31–37)
MCV RBC AUTO: 81.4 FL (ref 74–97)
MONOCYTES # BLD: 0.9 K/UL (ref 0.05–1.2)
MONOCYTES NFR BLD AUTO: 13 % (ref 3–10)
NEUTS SEG # BLD: 5.2 K/UL (ref 1.8–8)
NEUTS SEG NFR BLD AUTO: 70 % (ref 40–73)
NITRITE UR QL STRIP.AUTO: NEGATIVE
PH UR STRIP: 5.5 [PH] (ref 5–8)
PLATELET # BLD AUTO: 196 K/UL (ref 135–420)
PMV BLD AUTO: 9.1 FL (ref 9.2–11.8)
POTASSIUM SERPL-SCNC: 2.6 MMOL/L (ref 3.5–5.5)
PROT UR STRIP-MCNC: ABNORMAL MG/DL
RBC # BLD AUTO: 3.65 M/UL (ref 4.7–5.5)
RBC #/AREA URNS HPF: NEGATIVE /HPF (ref 0–5)
SODIUM SERPL-SCNC: 139 MMOL/L (ref 136–145)
SP GR UR REFRACTOMETRY: 1.01 (ref 1–1.03)
UROBILINOGEN UR QL STRIP.AUTO: 1 EU/DL (ref 0.2–1)
WBC # BLD AUTO: 7.4 K/UL (ref 4.6–13.2)
WBC URNS QL MICRO: NEGATIVE /HPF (ref 0–4)

## 2017-07-02 PROCEDURE — 81001 URINALYSIS AUTO W/SCOPE: CPT | Performed by: EMERGENCY MEDICINE

## 2017-07-02 PROCEDURE — 96366 THER/PROPH/DIAG IV INF ADDON: CPT

## 2017-07-02 PROCEDURE — 74011250636 HC RX REV CODE- 250/636: Performed by: EMERGENCY MEDICINE

## 2017-07-02 PROCEDURE — 82962 GLUCOSE BLOOD TEST: CPT

## 2017-07-02 PROCEDURE — 74011250637 HC RX REV CODE- 250/637: Performed by: EMERGENCY MEDICINE

## 2017-07-02 PROCEDURE — 85025 COMPLETE CBC W/AUTO DIFF WBC: CPT | Performed by: EMERGENCY MEDICINE

## 2017-07-02 PROCEDURE — 80048 BASIC METABOLIC PNL TOTAL CA: CPT | Performed by: EMERGENCY MEDICINE

## 2017-07-02 PROCEDURE — 96365 THER/PROPH/DIAG IV INF INIT: CPT

## 2017-07-02 PROCEDURE — 99285 EMERGENCY DEPT VISIT HI MDM: CPT

## 2017-07-02 PROCEDURE — 83735 ASSAY OF MAGNESIUM: CPT | Performed by: EMERGENCY MEDICINE

## 2017-07-02 RX ORDER — POTASSIUM CHLORIDE 7.45 MG/ML
10 INJECTION INTRAVENOUS
Status: COMPLETED | OUTPATIENT
Start: 2017-07-02 | End: 2017-07-02

## 2017-07-02 RX ORDER — POTASSIUM CHLORIDE 20 MEQ/1
60 TABLET, EXTENDED RELEASE ORAL
Status: COMPLETED | OUTPATIENT
Start: 2017-07-02 | End: 2017-07-02

## 2017-07-02 RX ADMIN — POTASSIUM CHLORIDE 10 MEQ: 7.46 INJECTION, SOLUTION INTRAVENOUS at 09:44

## 2017-07-02 RX ADMIN — POTASSIUM CHLORIDE 60 MEQ: 1500 TABLET, FILM COATED, EXTENDED RELEASE ORAL at 08:37

## 2017-07-02 RX ADMIN — POTASSIUM CHLORIDE 10 MEQ: 7.46 INJECTION, SOLUTION INTRAVENOUS at 08:38

## 2017-07-02 NOTE — ED PROVIDER NOTES
HPI Comments: 7:19 AM Naveed Woo is a 50 y.o. male with h/o HTN, DM, and colon cancer who presents to ED via EMS complaining of diaphoresis onset this morning. Per EMS, patient had blood sugar of 27 on field. EMS gave 1 amp of D50 and sugar went to 151. Patient did not eat anything for dinner last night. At 2:00 AM he took his short-acting and long-acting insulin. He took his blood sugar this morning which read 400, so he took 22 units of Novolog. Patient denies fever and SOB. He was discharged yesterday from [facility] for sepsis of unknown source. No other concerns or symptoms at this time. PCP: Mariann Rojas MD    The history is provided by the patient. Past Medical History:   Diagnosis Date    [facility] [address]     Diabetes ([facility] [address]     Hypercholesteremia     Hypertension     Sleep apnea     C-PAP       Past Surgical History:   Procedure Laterality Date    COLONOSCOPY N/A 8/11/2016    COLONOSCOPY performed by Hira Andino MD at [facility] ENDOSCOPY    HX APPENDECTOMY      HX TONSILLECTOMY           History reviewed. No pertinent family history. Social History     Social History    Marital status:      Spouse name: N/A    Number of children: N/A    Years of education: N/A     Occupational History    Not on file. Social History Main Topics    Smoking status: Never Smoker    Smokeless tobacco: Never Used    Alcohol use No    Drug use: No    Sexual activity: Not on file     Other Topics Concern    Not on file     Social History Narrative         ALLERGIES: Review of patient's allergies indicates no known allergies. Review of Systems   Constitutional: Positive for diaphoresis. Negative for fever. HENT: Negative for sore throat. Eyes: Negative for redness and visual disturbance. Respiratory: Negative for shortness of breath and wheezing. Cardiovascular: Negative for chest pain. Gastrointestinal: Negative for abdominal pain and nausea.    Endocrine: Negative for polyuria. Genitourinary: Negative for dysuria. Musculoskeletal: Negative for arthralgias and neck stiffness. Skin: Positive for pallor. Negative for rash. Neurological: Negative for headaches. All other systems reviewed and are negative. Vitals:    07/02/17 0900 07/02/17 0915 07/02/17 0930 07/02/17 0943   BP: 138/80 (!) 153/92 (!) 162/92    Pulse: 72 72 89    Resp: 12 13 19    Temp:    96.9 °F (36.1 °C)   SpO2: 98% 96% 99%    Weight:       Height:                Physical Exam   Constitutional: He is oriented to person, place, and time. He appears well-developed and well-nourished. No distress. HENT:   Head: Normocephalic and atraumatic. Mouth/Throat: Oropharynx is clear and moist.   Eyes: Conjunctivae are normal. Pupils are equal, round, and reactive to light. No scleral icterus. Neck: Normal range of motion. Neck supple. Cardiovascular: Normal rate and intact distal pulses. Capillary refill < 3 seconds   Pulmonary/Chest: Effort normal and breath sounds normal. No respiratory distress. He has no wheezes. Power Port left chest wall. Lungs clear. Abdominal: Soft. Bowel sounds are normal. He exhibits no distension. There is no tenderness. Musculoskeletal: Normal range of motion. He exhibits no edema. Lymphadenopathy:     He has no cervical adenopathy. Neurological: He is alert and oriented to person, place, and time. No cranial nerve deficit. Strength 5/5. Sensation intact. Skin: Skin is warm and dry. He is not diaphoretic. Nursing note and vitals reviewed. MDM  Number of Diagnoses or Management Options  Hypoglycemia:   Hypokalemia:   Diagnosis management comments: ddx Hypoglycemia, other metabolic, infectious    Temp 94.5, likely associated with extreme hypoglycemia 27  Placed on warm blankets, will recheck. Pt in no distress, AOx4 after glucose by EMS.     Check labs, UA    BG 86    Gave him meal, will recheck blood sugar    K 2.6, will chk Mg    Mg 1.9, wnl    Gave  PO and IV K    BG 94    Reassessed and pt resting, no complaint. Wife at bedside    Repeat     Repeat temp 96.9    I have reassessed the patient. I have discussed the workup, results and plan with the patient and patient is in agreement. Patient is feeling better. Patient was discharge in stable condition. Patient was given outpatient follow up. Patient is to return to emergency department if any new or worsening condition. 10:33 AM July 02, 2017           Amount and/or Complexity of Data Reviewed  Clinical lab tests: ordered and reviewed  Tests in the medicine section of CPT®: ordered and reviewed  Review and summarize past medical records: yes  Independent visualization of images, tracings, or specimens: yes    Risk of Complications, Morbidity, and/or Mortality  Presenting problems: moderate  Diagnostic procedures: moderate  Management options: moderate    Patient Progress  Patient progress: improved    ED Course       Procedures    Vitals:  Patient Vitals for the past 12 hrs:   Temp Pulse Resp BP SpO2   07/02/17 0943 96.9 °F (36.1 °C) - - - -   07/02/17 0930 - 89 19 (!) 162/92 99 %   07/02/17 0915 - 72 13 (!) 153/92 96 %   07/02/17 0900 - 72 12 138/80 98 %   07/02/17 0845 - 80 14 169/87 98 %   07/02/17 0830 - 78 12 125/77 98 %   07/02/17 0815 - 80 14 161/81 97 %   07/02/17 0800 - 79 18 (!) 166/91 100 %   07/02/17 0751 (!) 94.5 °F (34.7 °C) - - - -   07/02/17 0745 - 79 22 169/85 100 %   07/02/17 0730 - 69 12 149/77 97 %   07/02/17 0711 - 72 14 145/89 98 %   SpO2 reviewed and within normal limits.      Medications ordered:   Medications   potassium chloride 10 mEq in 100 ml IVPB (10 mEq IntraVENous New Bag 7/2/17 0944)   potassium chloride (K-DUR, KLOR-CON) SR tablet 60 mEq (60 mEq Oral Given 7/2/17 0837)         Lab findings:  Recent Results (from the past 12 hour(s))   GLUCOSE, POC    Collection Time: 07/02/17  7:12 AM   Result Value Ref Range    Glucose (POC) 120 (H) 70 - 110 mg/dL   CBC WITH AUTOMATED DIFF Collection Time: 07/02/17  7:46 AM   Result Value Ref Range    WBC 7.4 4.6 - 13.2 K/uL    RBC 3.65 (L) 4.70 - 5.50 M/uL    HGB 10.0 (L) 13.0 - 16.0 g/dL    HCT 29.7 (L) 36.0 - 48.0 %    MCV 81.4 74.0 - 97.0 FL    MCH 27.4 24.0 - 34.0 PG    MCHC 33.7 31.0 - 37.0 g/dL    RDW 16.2 (H) 11.6 - 14.5 %    PLATELET 399 256 - 019 K/uL    MPV 9.1 (L) 9.2 - 11.8 FL    NEUTROPHILS 70 40 - 73 %    LYMPHOCYTES 15 (L) 21 - 52 %    MONOCYTES 13 (H) 3 - 10 %    EOSINOPHILS 2 0 - 5 %    BASOPHILS 0 0 - 2 %    ABS. NEUTROPHILS 5.2 1.8 - 8.0 K/UL    ABS. LYMPHOCYTES 1.1 0.9 - 3.6 K/UL    ABS. MONOCYTES 0.9 0.05 - 1.2 K/UL    ABS. EOSINOPHILS 0.2 0.0 - 0.4 K/UL    ABS.  BASOPHILS 0.0 0.0 - 0.06 K/UL    DF AUTOMATED     METABOLIC PANEL, BASIC    Collection Time: 07/02/17  7:46 AM   Result Value Ref Range    Sodium 139 136 - 145 mmol/L    Potassium 2.6 (LL) 3.5 - 5.5 mmol/L    Chloride 102 100 - 108 mmol/L    CO2 28 21 - 32 mmol/L    Anion gap 9 3.0 - 18 mmol/L    Glucose 86 74 - 99 mg/dL    BUN 11 7.0 - 18 MG/DL    Creatinine 1.06 0.6 - 1.3 MG/DL    BUN/Creatinine ratio 10 (L) 12 - 20      GFR est AA >60 >60 ml/min/1.73m2    GFR est non-AA >60 >60 ml/min/1.73m2    Calcium 8.5 8.5 - 10.1 MG/DL   MAGNESIUM    Collection Time: 07/02/17  7:46 AM   Result Value Ref Range    Magnesium 1.9 1.6 - 2.6 mg/dL   GLUCOSE, POC    Collection Time: 07/02/17  8:25 AM   Result Value Ref Range    Glucose (POC) 94 70 - 110 mg/dL   URINALYSIS W/ RFLX MICROSCOPIC    Collection Time: 07/02/17  9:34 AM   Result Value Ref Range    Color YELLOW      Appearance CLEAR      Specific gravity 1.015 1.005 - 1.030      pH (UA) 5.5 5.0 - 8.0      Protein TRACE (A) NEG mg/dL    Glucose 500 (A) NEG mg/dL    Ketone NEGATIVE  NEG mg/dL    Bilirubin NEGATIVE  NEG      Blood NEGATIVE  NEG      Urobilinogen 1.0 0.2 - 1.0 EU/dL    Nitrites NEGATIVE  NEG      Leukocyte Esterase NEGATIVE  NEG     GLUCOSE, POC    Collection Time: 07/02/17 10:25 AM   Result Value Ref Range Glucose (POC) 127 (H) 70 - 110 mg/dL   GLUCOSE, POC    Collection Time: 07/02/17 10:29 AM   Result Value Ref Range    Glucose (POC) 128 (H) 70 - 110 mg/dL       Orders:  Orders Placed This Encounter    CBC WITH AUTOMATED DIFF     Standing Status:   Standing     Number of Occurrences:   1    METABOLIC PANEL, BASIC     Standing Status:   Standing     Number of Occurrences:   1    MAGNESIUM     Standing Status:   Standing     Number of Occurrences:   1    URINALYSIS W/ RFLX MICROSCOPIC     Standing Status:   Standing     Number of Occurrences:   1    URINE MICROSCOPIC ONLY     Standing Status:   Standing     Number of Occurrences:   1    DIET REGULAR     Standing Status:   Standing     Number of Occurrences:   1    NURSING-MISCELLANEOUS: diabetic meal for pt please CONTINUOUS     Standing Status:   Standing     Number of Occurrences:   1     Order Specific Question:   Description of Order:     Answer:   diabetic meal for pt please    GLUCOSE, POC     Standing Status:   Standing     Number of Occurrences:   1    POC GLUCOSE     Standing Status:   Standing     Number of Occurrences:   1    GLUCOSE, POC     Standing Status:   Standing     Number of Occurrences:   1    GLUCOSE, POC     Standing Status:   Standing     Number of Occurrences:   1    GLUCOSE, POC     Standing Status:   Standing     Number of Occurrences:   1    INSERT PERIPHERAL IV ONE TIME STAT     Standing Status:   Standing     Number of Occurrences:   1    potassium chloride 10 mEq in 100 ml IVPB    potassium chloride (K-DUR, KLOR-CON) SR tablet 60 mEq       Reevaluation, Progress notes, Consult notes, or additional Procedure notes:   10:31 AM I have reassessed the patient and discussed their results and diagnosis. Pt will be discharged in stable condition. Patient is to return to emergency department if any new or worsening condition. Patient understands and verbalizes agreement with plan. Disposition:  Diagnosis:   1. Hypoglycemia    2. Hypokalemia        Disposition: Discharged    Follow-up Information     Follow up With Details Comments [address] MD Sarah Call in 1 day for primary care follow up Daron Vinh [phone] [facility] EMERGENCY DEPT Go to As needed, If symptoms worsen [address] [facility] K[facility]le [facility].  [phone]           Patient's Medications   Start Taking    No medications on file   Continue Taking    AMLODIPINE (NORVASC) 10 MG TABLET    Take 5 mg by mouth daily. ATORVASTATIN (LIPITOR) 10 MG TABLET    Take 40 mg by mouth daily. CANAGLIFLOZIN (INVOKANA) 300 MG TABLET    Take 300 mg by mouth Daily (before breakfast). CHOLECALCIFEROL, VITAMIN D3, (VITAMIN D3) 5,000 UNIT TAB TABLET    Take 5,000 Units by mouth daily. Indications: PREVENTION OF VITAMIN D DEFICIENCY    CYCLOBENZAPRINE (FLEXERIL) 10 MG TABLET        DOCUSATE SODIUM (COLACE) 100 MG CAPSULE    Take 1 Cap by mouth two (2) times a day for 10 days. ERYTHROMYCIN 500 MG TABLET    Take one tab at 3 PM, 5 PM, and 9 PM the day before surgery. GABAPENTIN (NEURONTIN) 400 MG CAPSULE    Take 400 mg by mouth as needed. INSULIN ASPART (NOVOLOG) 100 UNIT/ML INJECTION    by SubCUTAneous route. INSULIN DETEMIR (LEVEMIR) 100 UNIT/ML INJECTION    by SubCUTAneous route nightly. LOSARTAN (COZAAR) 100 MG TABLET    Take 100 mg by mouth daily. NEOMYCIN (MYCIFRADIN) 500 MG TABLET    Take two tabs at 3 PM, 5 PM, and 9 PM the day before surgery. OXYCODONE-ACETAMINOPHEN (PERCOCET) 5-325 MG PER TABLET    Take 1 Tab by mouth every four (4) hours as needed for Pain. Max Daily Amount: 6 Tabs. PANTOPRAZOLE (PROTONIX) 40 MG TABLET    Take 40 mg by mouth daily.     PEG 3350-ELECTROLYTES (COLYTE) 016-00.47-4.59 -5.84 GRAM SOLUTION    Take as directed   These Medications have changed    No medications on file   Stop Taking    No medications on file         [id number] [facility] for and in the presence of Bouchra Simpson DO Aleyda (07/02/17)      Physician Attestation  I personally performed the services described in this documentation, reviewed and edited the documentation which was dictated to the scribe in my presence, and it accurately records my words and actions.     Ashleigh Elias DO (07/02/17)      Signed by: Carl Ritchie, July 02, 2017 at 10:34 AM

## 2017-07-02 NOTE — DISCHARGE INSTRUCTIONS
Hypoglycemia: Care Instructions  Your Care Instructions  Hypoglycemia means that your blood sugar is low and your body is not getting enough fuel. Some people get low blood sugar from not eating often enough. Some medicines to treat diabetes can cause low blood sugar. People who have had surgery on their stomachs or intestines may get hypoglycemia. Problems with the pancreas, kidneys, or liver also can cause low blood sugar. A snack or drink with sugar in it will raise your blood sugar and should ease your symptoms right away. Your doctor may recommend that you change or stop your medicines until you can get your blood sugar levels under control. In the long run, you may need to change your diet and eating habits so that you get enough fuel for your body throughout the day. Follow-up care is a key part of your treatment and safety. Be sure to make and go to all appointments, and call your doctor if you are having problems. It's also a good idea to know your test results and keep a list of the medicines you take. How can you care for yourself at home? · Learn to recognize the early signs of low blood sugar. Signs include:  ¨ Nausea. ¨ Hunger. ¨ Feeling nervous, irritable, or shaky. ¨ Cold, clammy, wet skin. ¨ Sweating (when you are not exercising). ¨ A fast heartbeat. ¨ Numbness or tingling of the fingertips or lips. · If you feel an episode of low blood sugar coming on, drink fruit juice or sugared (not diet) soda, or eat sugar in the form of candy, cubes, or tablets. Cvergenx are another [employer]. · Eat small, frequent meals so that you do not get too hungry between meals. · Balance extra exercise with eating more. · Keep a written record of your low blood sugar episodes, including when you last ate and what you ate, so that you can learn what causes your blood sugar to drop.   · Make sure your family, friends, and coworkers know the symptoms of low blood sugar and know what to do to get your sugar level up. · Wear medical alert jewelry that lists your condition. You can buy this at most drugstores. When should you call for help? Call 911 anytime you think you may need emergency care. For example, call if:  · You passed out (lost consciousness). · You are confused or cannot think clearly. · Your blood sugar is very high or very low. Watch closely for changes in your health, and be sure to contact your doctor if:  · Your blood sugar stays outside the level your doctor set for you. · You have any problems. Where can you learn more? Go to http://talia-anders.info/. Enter Y964 in the search box to learn more about \"Hypoglycemia: Care Instructions. \"  Current as of: March 13, 2017  Content Version: 11.3  © 0946-6710 [employer]. Care instructions adapted under license by [employer] (which disclaims liability or warranty for this information). If you have questions about a medical condition or this instruction, always ask your healthcare professional. [place] [address] any warranty or liability for your use of this information. Hypokalemia: Care Instructions  Your Care Instructions  Hypokalemia (say \"kk-th-[id number]-ARMANDO-mani-uh\") is a low level of potassium. The heart, muscles, kidneys, and nervous system all need potassium to work well. This problem has many different causes. Kidney problems, diet, and medicines like diuretics and laxatives can cause it. So can vomiting or diarrhea. In some cases, cancer is the cause. Your doctor may do tests to find the cause of your low potassium levels. You may need medicines to bring your potassium levels back to normal. You may also need regular blood tests to check your potassium. If you have very low potassium, you may need intravenous (IV) medicines. You also may need tests to check the electrical activity of your heart.  Heart problems caused by low potassium levels can be very serious. Follow-up care is a key part of your treatment and safety. Be sure to make and go to all appointments, and call your doctor if you are having problems. It's also a good idea to know your test results and keep a list of the medicines you take. How can you care for yourself at home? · If your doctor recommends it, eat foods that have a lot of potassium. These include fresh fruits, juices, and vegetables. They also include nuts, beans, and milk. · Be safe with medicines. If your doctor prescribes medicines or potassium supplements, take them exactly as directed. Call your doctor if you have any problems with your medicines. · Get your potassium levels tested as often as your doctor tells you. When should you call for help? Call 911 anytime you think you may need emergency care. For example, call if:  · You feel like your heart is missing beats. Heart problems caused by low potassium can cause death. · You passed out (lost consciousness). · You have a seizure. Call your doctor now or seek immediate medical care if:  · You feel weak or unusually tired. · You have severe arm or leg cramps. · You have tingling or numbness. · You feel sick to your stomach, or you vomit. · You have belly cramps. · You feel bloated or constipated. · You have to urinate a lot. · You feel very thirsty most of the time. · You are dizzy or lightheaded, or you feel like you may faint. · You feel depressed, or you lose touch with reality. Watch closely for changes in your health, and be sure to contact your doctor if:  · You do not get better as expected. Where can you learn more? Go to http://talia-anders.info/. Enter G358 in the search box to learn more about \"Hypokalemia: Care Instructions. \"  Current as of: July 28, 2016  Content Version: 11.3  © 7195-8506 [employer].  Care instructions adapted under license by [employer] (which disclaims liability or warranty for this information). If you have questions about a medical condition or this instruction, always ask your healthcare professional. [place] [address] any warranty or liability for your use of this information.

## 2017-07-02 NOTE — ED NOTES
Temperature would not read orally. 94.5 rectally. Covered patient with warm blankets. No snack food or meal boxes in the break room. Reported to . Ordered patient a regular diet, informed provider. Patient is now up eating a meal tray in bed. Will recheck blood sugar.

## 2017-07-02 NOTE — ED TRIAGE NOTES
Patient arrived via EMS with low blood sugar, pale and diaphoretic. Patient has a hx of colon ca which he last received chemo for on 5/30/17, and was due to get new scans on 6/26/17 however, he was hospitalized for sepsis for 6 days at [facility]. Pt also has DM type 2, and last night about 2am before bed, blood sugar was about 400 and he took both long acting and short acting insulin. Woke up this am with a blood sugar of 27.

## 2017-07-03 LAB — GLUCOSE BLD STRIP.AUTO-MCNC: 127 MG/DL (ref 70–110)

## 2017-07-11 NOTE — ANCILLARY DISCHARGE INSTRUCTIONS
[facility]  Discharge Phone Call       After-Care Discharge Phone Call Questions: no answer     Were you able to get your prescriptions filled? Comment:      [] Yes  []No    Comment if answer is \"No\"   Are you taking your medication(s) as your doctor ordered? Do you understand the purpose of your medications? Comment:    [] Yes  []No    Comment if answer is \"No\"   Are you taking any other medications that are not on the list?  Comment:      [] Yes  []No    Comment if answer is \"Yes\"   Do you have any questions about your medications? Are you aware of potential side effects? Comment:    [] Yes  []No    Comment if answer is \"Yes\"   Did you make your follow-up appointments (if the hospital did not do this before  discharge)? Comment:    [] Yes  []No    Comment if answer is \"No\"   Is there any reason you might not be able to keep your follow-up appointments? Comment:     [] Yes  []No    Comment if answer is \"Yes\"   Do you have any questions about your care plan? Are you aware of what health problems to be alert for? Comment:    [] Yes  []No    Comment if answer is \"Yes\"   Do you have a good understanding of how you should manage your health? Comment:    [] Yes  []No    Comment if answer is \"Yes\"   Do you know which symptoms to watch for that would mean you would need to call your doctor right away? Comment:      [] Yes  []No    Comment if answer is \"No\"   Do you have any questions about the follow up process or any instructions that we have provided? Comment:    [] Yes  []No    Comment if answer is \"Yes\"   Did staff take your preferences into account?         [] Yes  []No    Comment if answer is \"Yes\"

## 2017-09-15 ENCOUNTER — HOSPITAL ENCOUNTER (INPATIENT)
Age: 52
LOS: 6 days | Discharge: HOME | DRG: 435 | End: 2017-09-21
Attending: EMERGENCY MEDICINE | Admitting: INTERNAL MEDICINE
Payer: COMMERCIAL

## 2017-09-15 DIAGNOSIS — K92.2 LOWER GI BLEED: ICD-10-CM

## 2017-09-15 DIAGNOSIS — R79.1 ELEVATED INR: ICD-10-CM

## 2017-09-15 DIAGNOSIS — D64.9 LOW HEMOGLOBIN: ICD-10-CM

## 2017-09-15 DIAGNOSIS — I81 PORTAL VEIN THROMBOSIS: Primary | ICD-10-CM

## 2017-09-15 PROBLEM — C18.9 METASTATIC COLON CANCER TO LIVER (HCC): Status: ACTIVE | Noted: 2017-09-15

## 2017-09-15 PROBLEM — C78.7 METASTATIC COLON CANCER TO LIVER (HCC): Status: ACTIVE | Noted: 2017-09-15

## 2017-09-15 LAB
ALBUMIN SERPL-MCNC: 2.6 G/DL (ref 3.4–5)
ALBUMIN/GLOB SERPL: 0.5 {RATIO} (ref 0.8–1.7)
ALP SERPL-CCNC: 388 U/L (ref 45–117)
ALT SERPL-CCNC: 15 U/L (ref 16–61)
ANION GAP SERPL CALC-SCNC: 14 MMOL/L (ref 3–18)
APTT PPP: 39.7 SEC (ref 23–36.4)
AST SERPL-CCNC: 52 U/L (ref 15–37)
BASOPHILS # BLD: 0 K/UL (ref 0–0.06)
BASOPHILS NFR BLD: 0 % (ref 0–2)
BILIRUB SERPL-MCNC: 0.8 MG/DL (ref 0.2–1)
BUN SERPL-MCNC: 28 MG/DL (ref 7–18)
BUN/CREAT SERPL: 15 (ref 12–20)
CALCIUM SERPL-MCNC: 8.8 MG/DL (ref 8.5–10.1)
CHLORIDE SERPL-SCNC: 97 MMOL/L (ref 100–108)
CO2 SERPL-SCNC: 24 MMOL/L (ref 21–32)
CREAT SERPL-MCNC: 1.83 MG/DL (ref 0.6–1.3)
DIFFERENTIAL METHOD BLD: ABNORMAL
EOSINOPHIL # BLD: 0.1 K/UL (ref 0–0.4)
EOSINOPHIL NFR BLD: 2 % (ref 0–5)
ERYTHROCYTE [DISTWIDTH] IN BLOOD BY AUTOMATED COUNT: 16.9 % (ref 11.6–14.5)
EST. AVERAGE GLUCOSE BLD GHB EST-MCNC: 212 MG/DL
GLOBULIN SER CALC-MCNC: 5 G/DL (ref 2–4)
GLUCOSE BLD STRIP.AUTO-MCNC: 264 MG/DL (ref 70–110)
GLUCOSE SERPL-MCNC: 295 MG/DL (ref 74–99)
HBA1C MFR BLD: 9 % (ref 4.2–5.6)
HCT VFR BLD AUTO: 22.8 % (ref 36–48)
HGB BLD-MCNC: 7.5 G/DL (ref 13–16)
INR PPP: 1.5 (ref 0.8–1.2)
LYMPHOCYTES # BLD: 0.8 K/UL (ref 0.9–3.6)
LYMPHOCYTES NFR BLD: 13 % (ref 21–52)
MCH RBC QN AUTO: 25.5 PG (ref 24–34)
MCHC RBC AUTO-ENTMCNC: 32.9 G/DL (ref 31–37)
MCV RBC AUTO: 77.6 FL (ref 74–97)
MONOCYTES # BLD: 0.7 K/UL (ref 0.05–1.2)
MONOCYTES NFR BLD: 11 % (ref 3–10)
NEUTS SEG # BLD: 4.7 K/UL (ref 1.8–8)
NEUTS SEG NFR BLD: 74 % (ref 40–73)
PLATELET # BLD AUTO: 266 K/UL (ref 135–420)
PMV BLD AUTO: 9.2 FL (ref 9.2–11.8)
POTASSIUM SERPL-SCNC: 4 MMOL/L (ref 3.5–5.5)
PROT SERPL-MCNC: 7.6 G/DL (ref 6.4–8.2)
PROTHROMBIN TIME: 17.6 SEC (ref 11.5–15.2)
RBC # BLD AUTO: 2.94 M/UL (ref 4.7–5.5)
SODIUM SERPL-SCNC: 135 MMOL/L (ref 136–145)
WBC # BLD AUTO: 6.3 K/UL (ref 4.6–13.2)

## 2017-09-15 PROCEDURE — 74011250636 HC RX REV CODE- 250/636: Performed by: EMERGENCY MEDICINE

## 2017-09-15 PROCEDURE — 86920 COMPATIBILITY TEST SPIN: CPT | Performed by: EMERGENCY MEDICINE

## 2017-09-15 PROCEDURE — 96374 THER/PROPH/DIAG INJ IV PUSH: CPT

## 2017-09-15 PROCEDURE — 82962 GLUCOSE BLOOD TEST: CPT

## 2017-09-15 PROCEDURE — 86900 BLOOD TYPING SEROLOGIC ABO: CPT | Performed by: EMERGENCY MEDICINE

## 2017-09-15 PROCEDURE — 77030020263 HC SOL INJ SOD CL0.9% LFCR 1000ML

## 2017-09-15 PROCEDURE — 85025 COMPLETE CBC W/AUTO DIFF WBC: CPT | Performed by: EMERGENCY MEDICINE

## 2017-09-15 PROCEDURE — 96375 TX/PRO/DX INJ NEW DRUG ADDON: CPT

## 2017-09-15 PROCEDURE — 96361 HYDRATE IV INFUSION ADD-ON: CPT

## 2017-09-15 PROCEDURE — 30233N1 TRANSFUSION OF NONAUTOLOGOUS RED BLOOD CELLS INTO PERIPHERAL VEIN, PERCUTANEOUS APPROACH: ICD-10-PCS | Performed by: EMERGENCY MEDICINE

## 2017-09-15 PROCEDURE — 74011250637 HC RX REV CODE- 250/637: Performed by: INTERNAL MEDICINE

## 2017-09-15 PROCEDURE — 74011250636 HC RX REV CODE- 250/636: Performed by: INTERNAL MEDICINE

## 2017-09-15 PROCEDURE — 83036 HEMOGLOBIN GLYCOSYLATED A1C: CPT | Performed by: INTERNAL MEDICINE

## 2017-09-15 PROCEDURE — 77030020256 HC SOL INJ NACL 0.9%  500ML

## 2017-09-15 PROCEDURE — 77030020250 HC SOL INJ D 5% LFCR 1000ML BG LF

## 2017-09-15 PROCEDURE — 99285 EMERGENCY DEPT VISIT HI MDM: CPT

## 2017-09-15 PROCEDURE — 85610 PROTHROMBIN TIME: CPT | Performed by: EMERGENCY MEDICINE

## 2017-09-15 PROCEDURE — 80053 COMPREHEN METABOLIC PANEL: CPT | Performed by: EMERGENCY MEDICINE

## 2017-09-15 PROCEDURE — 85730 THROMBOPLASTIN TIME PARTIAL: CPT | Performed by: EMERGENCY MEDICINE

## 2017-09-15 PROCEDURE — 36430 TRANSFUSION BLD/BLD COMPNT: CPT

## 2017-09-15 PROCEDURE — 65270000029 HC RM PRIVATE

## 2017-09-15 PROCEDURE — P9016 RBC LEUKOCYTES REDUCED: HCPCS | Performed by: EMERGENCY MEDICINE

## 2017-09-15 RX ORDER — ONDANSETRON 2 MG/ML
4 INJECTION INTRAMUSCULAR; INTRAVENOUS
Status: COMPLETED | OUTPATIENT
Start: 2017-09-15 | End: 2017-09-15

## 2017-09-15 RX ORDER — MORPHINE SULFATE 10 MG/ML
5 INJECTION, SOLUTION INTRAMUSCULAR; INTRAVENOUS
Status: DISCONTINUED | OUTPATIENT
Start: 2017-09-15 | End: 2017-09-21 | Stop reason: HOSPADM

## 2017-09-15 RX ORDER — CYCLOBENZAPRINE HCL 10 MG
10 TABLET ORAL 3 TIMES DAILY
Status: DISCONTINUED | OUTPATIENT
Start: 2017-09-15 | End: 2017-09-21 | Stop reason: HOSPADM

## 2017-09-15 RX ORDER — AMLODIPINE BESYLATE 5 MG/1
5 TABLET ORAL DAILY
Status: DISCONTINUED | OUTPATIENT
Start: 2017-09-16 | End: 2017-09-21 | Stop reason: HOSPADM

## 2017-09-15 RX ORDER — LANOLIN ALCOHOL/MO/W.PET/CERES
4000 CREAM (GRAM) TOPICAL DAILY
COMMUNITY

## 2017-09-15 RX ORDER — SODIUM CHLORIDE 9 MG/ML
100 INJECTION, SOLUTION INTRAVENOUS CONTINUOUS
Status: DISCONTINUED | OUTPATIENT
Start: 2017-09-15 | End: 2017-09-18

## 2017-09-15 RX ORDER — HYDROMORPHONE HYDROCHLORIDE 1 MG/ML
1 INJECTION, SOLUTION INTRAMUSCULAR; INTRAVENOUS; SUBCUTANEOUS ONCE
Status: COMPLETED | OUTPATIENT
Start: 2017-09-15 | End: 2017-09-15

## 2017-09-15 RX ORDER — ONDANSETRON 4 MG/1
8 TABLET, ORALLY DISINTEGRATING ORAL
Status: DISCONTINUED | OUTPATIENT
Start: 2017-09-15 | End: 2017-09-21 | Stop reason: HOSPADM

## 2017-09-15 RX ORDER — ATORVASTATIN CALCIUM 40 MG/1
40 TABLET, FILM COATED ORAL DAILY
Status: DISCONTINUED | OUTPATIENT
Start: 2017-09-16 | End: 2017-09-21 | Stop reason: HOSPADM

## 2017-09-15 RX ORDER — INSULIN ASPART 100 [IU]/ML
25 INJECTION, SOLUTION INTRAVENOUS; SUBCUTANEOUS
Status: DISCONTINUED | OUTPATIENT
Start: 2017-09-16 | End: 2017-09-21 | Stop reason: HOSPADM

## 2017-09-15 RX ORDER — LANOLIN ALCOHOL/MO/W.PET/CERES
CREAM (GRAM) TOPICAL
COMMUNITY

## 2017-09-15 RX ORDER — DEXTROSE 50 % IN WATER (D50W) INTRAVENOUS SYRINGE
25-50 AS NEEDED
Status: DISCONTINUED | OUTPATIENT
Start: 2017-09-15 | End: 2017-09-21 | Stop reason: HOSPADM

## 2017-09-15 RX ORDER — INSULIN ASPART 100 [IU]/ML
22 INJECTION, SOLUTION INTRAVENOUS; SUBCUTANEOUS 2 TIMES DAILY WITH MEALS
Status: DISCONTINUED | OUTPATIENT
Start: 2017-09-16 | End: 2017-09-21 | Stop reason: HOSPADM

## 2017-09-15 RX ORDER — OXYCODONE AND ACETAMINOPHEN 5; 325 MG/1; MG/1
1 TABLET ORAL
Status: DISCONTINUED | OUTPATIENT
Start: 2017-09-15 | End: 2017-09-21 | Stop reason: HOSPADM

## 2017-09-15 RX ORDER — INSULIN LISPRO 100 [IU]/ML
INJECTION, SOLUTION INTRAVENOUS; SUBCUTANEOUS
Status: DISCONTINUED | OUTPATIENT
Start: 2017-09-15 | End: 2017-09-15

## 2017-09-15 RX ORDER — PANTOPRAZOLE SODIUM 20 MG/1
40 TABLET, DELAYED RELEASE ORAL
Status: DISCONTINUED | OUTPATIENT
Start: 2017-09-15 | End: 2017-09-21 | Stop reason: HOSPADM

## 2017-09-15 RX ORDER — SODIUM CHLORIDE 9 MG/ML
125 INJECTION, SOLUTION INTRAVENOUS CONTINUOUS
Status: DISCONTINUED | OUTPATIENT
Start: 2017-09-15 | End: 2017-09-21 | Stop reason: HOSPADM

## 2017-09-15 RX ORDER — SODIUM CHLORIDE 9 MG/ML
250 INJECTION, SOLUTION INTRAVENOUS AS NEEDED
Status: DISCONTINUED | OUTPATIENT
Start: 2017-09-15 | End: 2017-09-18

## 2017-09-15 RX ORDER — MAGNESIUM SULFATE 100 %
4 CRYSTALS MISCELLANEOUS AS NEEDED
Status: DISCONTINUED | OUTPATIENT
Start: 2017-09-15 | End: 2017-09-21 | Stop reason: HOSPADM

## 2017-09-15 RX ORDER — ENOXAPARIN SODIUM 100 MG/ML
40 INJECTION SUBCUTANEOUS EVERY 24 HOURS
Status: DISCONTINUED | OUTPATIENT
Start: 2017-09-15 | End: 2017-09-17

## 2017-09-15 RX ORDER — ZOLPIDEM TARTRATE 5 MG/1
5 TABLET ORAL
Status: DISCONTINUED | OUTPATIENT
Start: 2017-09-15 | End: 2017-09-21 | Stop reason: HOSPADM

## 2017-09-15 RX ORDER — ATORVASTATIN CALCIUM 40 MG/1
40 TABLET, FILM COATED ORAL DAILY
COMMUNITY

## 2017-09-15 RX ORDER — LANOLIN ALCOHOL/MO/W.PET/CERES
1 CREAM (GRAM) TOPICAL
Status: DISCONTINUED | OUTPATIENT
Start: 2017-09-15 | End: 2017-09-21 | Stop reason: HOSPADM

## 2017-09-15 RX ORDER — GABAPENTIN 400 MG/1
800 CAPSULE ORAL 2 TIMES DAILY
Status: DISCONTINUED | OUTPATIENT
Start: 2017-09-15 | End: 2017-09-21 | Stop reason: HOSPADM

## 2017-09-15 RX ADMIN — OXYCODONE AND ACETAMINOPHEN 1 TABLET: 5; 325 TABLET ORAL at 17:37

## 2017-09-15 RX ADMIN — FERROUS SULFATE TAB 325 MG (65 MG ELEMENTAL FE) 325 MG: 325 (65 FE) TAB at 17:36

## 2017-09-15 RX ADMIN — GABAPENTIN 800 MG: 400 CAPSULE ORAL at 17:36

## 2017-09-15 RX ADMIN — PANTOPRAZOLE SODIUM 40 MG: 40 TABLET, DELAYED RELEASE ORAL at 17:36

## 2017-09-15 RX ADMIN — SODIUM CHLORIDE 100 ML/HR: 900 INJECTION, SOLUTION INTRAVENOUS at 14:36

## 2017-09-15 RX ADMIN — SODIUM CHLORIDE 125 ML/HR: 900 INJECTION, SOLUTION INTRAVENOUS at 21:48

## 2017-09-15 RX ADMIN — MORPHINE SULFATE 5 MG: 10 INJECTION INTRAMUSCULAR; INTRAVENOUS; SUBCUTANEOUS at 21:40

## 2017-09-15 RX ADMIN — ONDANSETRON 4 MG: 2 INJECTION INTRAMUSCULAR; INTRAVENOUS at 14:28

## 2017-09-15 RX ADMIN — CYCLOBENZAPRINE HYDROCHLORIDE 10 MG: 10 TABLET, FILM COATED ORAL at 21:40

## 2017-09-15 RX ADMIN — HYDROMORPHONE HYDROCHLORIDE 1 MG: 1 INJECTION, SOLUTION INTRAMUSCULAR; INTRAVENOUS; SUBCUTANEOUS at 14:28

## 2017-09-15 RX ADMIN — CYCLOBENZAPRINE HYDROCHLORIDE 10 MG: 10 TABLET, FILM COATED ORAL at 17:55

## 2017-09-15 RX ADMIN — SODIUM CHLORIDE 125 ML/HR: 900 INJECTION, SOLUTION INTRAVENOUS at 16:50

## 2017-09-15 NOTE — IP AVS SNAPSHOT
Raza Gómez 
 
 
 [address] [place] [address] 
[phone] Patient: Jin Wisdom MRN: [id number] HEZ:[id number] You are allergic to the following No active allergies Recent Documentation Height Weight BMI Smoking Status 1.753 m 82.1 kg 26.73 kg/m2 Never Smoker Emergency Contacts Name Discharge Info Relation Home Work Mobile [address] CAREGIVER [3] Spouse [3] [phone] About your hospitalization You were admitted on:  September 15, 2017 You last received care in the:  [facility] 2W NEURO MED You were discharged on:  September 21, 2017 Unit phone number:  [phone] Why you were hospitalized Your primary diagnosis was:  Metastatic Colon Cancer To Liver (Hcc) Your diagnoses also included:  Diabetes (Hcc), Hypertension, Freddie (Acute Kidney Injury) (Hcc), Dm (Diabetes Mellitus) (Hcc) Providers Seen During Your Hospitalizations Provider Role Specialty Primary office phone Mark Vann MD Attending Provider Emergency Medicine [phone] Abelino Marques DO Attending Provider Internal Medicine [phone] Simba Parra MD Attending Provider Internal Medicine [phone] Your Primary Care Physician (PCP) Primary Care Physician Office Phone Office Fax [address], 1 [facility] [phone] Follow-up Information Follow up With Details Comments Contact Info Urszula Marin MD In 3 days Dr. Tyler Menendez prefers that patient calls and schedule their own follow up appointment  [address] [facility] [address] [address] [phone] Current Discharge Medication List  
  
START taking these medications Dose & Instructions Dispensing Information Comments Morning Noon Evening Bedtime  
 fentaNYL 25 mcg/hr PATCH Commonly known as:  Erasmo Manifold Your last dose was: Your next dose is: Dose:  1 Patch 1 Patch by TransDERmal route every seventy-two (72) hours. Max Daily Amount: 1 Patch. Quantity:  5 Patch Refills:  0 CONTINUE these medications which have NOT CHANGED Dose & Instructions Dispensing Information Comments Morning Noon Evening Bedtime  
 amLODIPine 10 mg tablet Commonly known as:  Ulices Melo Your last dose was: Your next dose is:    
   
   
 Dose:  5 mg Take 5 mg by mouth daily. Refills:  0  
     
   
   
   
  
 cholecalciferol (VITAMIN D3) 5,000 unit Tab tablet Commonly known as:  VITAMIN D3 Your last dose was: Your next dose is:    
   
   
 Dose:  5000 Units Take 5,000 Units by mouth daily. Indications: PREVENTION OF VITAMIN D DEFICIENCY Refills:  0  
     
   
   
   
  
 cyclobenzaprine 10 mg tablet Commonly known as:  FLEXERIL Your last dose was: Your next dose is:    
   
   
  Refills:  0  
     
   
   
   
  
 erythromycin 500 mg tablet Your last dose was: Your next dose is: Take one tab at 3 PM, 5 PM, and 9 PM the day before surgery. Quantity:  3 Tab Refills:  0  
     
   
   
   
  
 gabapentin 400 mg capsule Commonly known as:  NEURONTIN Your last dose was: Your next dose is:    
   
   
 Dose:  800 mg Take 800 mg by mouth two (2) times a day. Refills:  0  
     
   
   
   
  
 insulin aspart 100 unit/mL injection Commonly known as:  Consuella Needy Your last dose was: Your next dose is:    
   
   
 Dose:  30 Units 30 Units by SubCUTAneous route Before breakfast, lunch, and dinner. Refills:  0  
     
   
   
   
  
 insulin detemir 100 unit/mL injection Commonly known as:  LEVEMIR Your last dose was: Your next dose is:    
   
   
 Dose:  20 Units 20 Units by SubCUTAneous route two (2) times a day. Refills:  0 Iron 325 mg (65 mg iron) tablet Generic drug:  ferrous sulfate Your last dose was: Your next dose is: Take  by mouth three (3) times daily (with meals). Refills:  0 LIPITOR 40 mg tablet Generic drug:  atorvastatin Your last dose was: Your next dose is:    
   
   
 Dose:  40 mg Take 40 mg by mouth daily. Refills:  0  
     
   
   
   
  
 losartan 100 mg tablet Commonly known as:  COZAAR Your last dose was: Your next dose is:    
   
   
 Dose:  100 mg Take 100 mg by mouth daily. Refills:  0  
     
   
   
   
  
 oxyCODONE-acetaminophen 5-325 mg per tablet Commonly known as:  PERCOCET Your last dose was: Your next dose is:    
   
   
 Dose:  1 Tab Take 1 Tab by mouth every four (4) hours as needed for Pain. Max Daily Amount: 6 Tabs. Quantity:  20 Tab Refills:  0  
     
   
   
   
  
 VITAMIN B-12 1,000 mcg tablet Generic drug:  cyanocobalamin Your last dose was: Your next dose is:    
   
   
 Dose:  4000 mcg Take 4,000 mcg by mouth daily. Refills:  0 STOP taking these medications PROTONIX 40 mg tablet Generic drug:  pantoprazole Where to Get Your Medications Information on where to get these meds will be given to you by the nurse or doctor. ! Ask your nurse or doctor about these medications  
  fentaNYL 25 mcg/hr PATCH Discharge Instructions Colon Cancer: Care Instructions Your Care Instructions Colon cancer occurs when abnormal cells grow out of control in the lower part of your intestine (your colon). If the tumor was small and had not spread, your doctor may have removed it during the colonoscopy. But you may need surgery to remove the cancer if the tumor was too big or had spread too far to be removed during a colonoscopy.  If cancer has spread to another part of your body, such as the liver, you may need more far-reaching surgery. Treatment for colon cancer may also include radiation therapy and medicines that destroy cancer cells (chemotherapy). Being treated for cancer can weaken your body, and you may feel very tired. Get the rest your body needs so that you can feel better. When you find out that you have cancer, you may feel many emotions and may need some help coping. Seek out family, friends, and counselors for support. You also can do things at home to make yourself feel better while you go through treatment. Call the [employer] (7-794.407.7097) or visit its website at [employer] [employer] (air [employer]) for more information. Follow-up care is a key part of your treatment and safety. Be sure to make and go to all appointments, and call your doctor if you are having problems. It's also a good idea to know your test results and keep a list of the medicines you take. How can you care for yourself at home? · Take your medicines exactly as prescribed. Call your doctor if you think you are having a problem with your medicine. You may get medicine for nausea and vomiting if you have these side effects. · Follow your doctor's instructions to relieve pain. Pain from cancer and surgery can almost always be controlled. Use pain medicine when you first notice pain, before it becomes severe. · Eat healthy food. If you do not feel like eating, try to eat food that has protein and extra calories to keep up your strength and prevent weight loss. Drink liquid meal replacements for extra calories and protein. Try to eat your main meal early. · Get some physical activity every day, but do not get too tired. Keep doing the hobbies you enjoy as your energy allows. · Take steps to control your stress and workload. Learn relaxation techniques. ¨ Share your feelings. Stress and tension affect our emotions. By expressing your feelings to others, you may be able to understand and cope with them. ¨ Consider joining a support group. Talking about a problem with your spouse, a good friend, or other people with similar problems is a good way to reduce tension and stress. ¨ Express yourself through art. Try writing, dance, art, or crafts to relieve stress. Some dance, writing, or art groups may be available just for people who have cancer. ¨ Be kind to your body and mind. Getting enough sleep, eating a healthy diet, and taking time to do things you enjoy can contribute to an overall feeling of balance in your life and help reduce stress. ¨ Get help if you need it. Discuss your concerns with your doctor or counselor. · If you are vomiting or have diarrhea: ¨ Drink plenty of fluids (enough so that your urine is light yellow or clear like water) to prevent dehydration. Choose water and other caffeine-free clear liquids. If you have kidney, heart, or liver disease and have to limit fluids, talk with your doctor before you increase the amount of fluids you drink. ¨ When you are able to eat, try clear soups, mild foods, and liquids until all symptoms are gone for 12 to 48 hours. Other good choices include dry toast, crackers, cooked cereal, and gelatin dessert, such as Jell-O. · If you have not already done so, prepare a list of advance directives. Advance directives are instructions to your doctor and family members about what kind of care you want if you become unable to speak or express yourself. When should you call for help? Call 911 anytime you think you may need emergency care. For example, call if: 
· You pass maroon or very bloody stools. · You vomit blood or what looks like coffee grounds. · You have sudden chest pain and shortness of breath, or you cough up blood. · You have severe belly pain. Call your doctor now or seek immediate medical care if: 
· You have signs of a blood clot, such as: 
¨ Pain in your calf, back of the knee, thigh, or groin. ¨ Redness and swelling in your leg or groin. · You have a fever. · You have nausea or vomiting. · You are very constipated or have diarrhea for several days. · Your stools are black and tarlike or have streaks of blood. Watch closely for changes in your health, and be sure to contact your doctor if: 
· Your pain is not controlled by medicine. · Your symptoms get worse or are not improving as expected. Where can you learn more? Go to http://talia-anders.info/. Enter Q052 in the search box to learn more about \"Colon Cancer: Care Instructions. \" Current as of: July 26, 2016 Content Version: 11.3 © 3749-7761 [employer]. Care instructions adapted under license by .com (which disclaims liability or warranty for this information). If you have questions about a medical condition or this instruction, always ask your healthcare professional. [address] any warranty or liability for your use of this information. Anemia: Care Instructions Your Care Instructions Anemia is a low level of red blood cells, which carry oxygen throughout your body. Many things can cause anemia. Lack of iron is one of the most common causes. Your body needs iron to make hemoglobin, a substance in red blood cells that carries oxygen from the lungs to your body's cells. Without enough iron, the body produces fewer and smaller red blood cells. As a result, your body's cells do not get enough oxygen, and you feel tired and weak. And you may have trouble concentrating. Bleeding is the most common cause of a lack of iron. You may have heavy menstrual bleeding or bleeding caused by conditions such as ulcers, hemorrhoids, or cancer. Regular use of aspirin or other anti-inflammatory medicines (such as ibuprofen) also can cause bleeding in some people. A lack of iron in your diet also can cause anemia, especially at times when the body needs more iron, such as during pregnancy, infancy, and the teen years. Your doctor may have prescribed iron pills. It may take several months of treatment for your iron levels to return to normal. Your doctor also may suggest that you eat foods that are rich in iron, such as meat and beans. There are many other causes of anemia. It is not always due to a lack of iron. Finding the specific cause of your anemia will help your doctor find the right treatment for you. Follow-up care is a key part of your treatment and safety. Be sure to make and go to all appointments, and call your doctor if you are having problems. It's also a good idea to know your test results and keep a list of the medicines you take. How can you care for yourself at home? · Take your medicines exactly as prescribed. Call your doctor if you think you are having a problem with your medicine. · If your doctor recommends iron pills, take them as directed: ¨ Try to take the pills on an empty stomach about 1 hour before or 2 hours after meals. But you may need to take iron with food to avoid an upset stomach. ¨ Do not take antacids or drink milk or caffeine drinks (such as coffee, tea, or cola) at the same time or within 2 hours of the time that you take your iron. They can make it hard for your body to absorb the iron. ¨ Vitamin C (from food or supplements) helps your body absorb iron. Try taking iron pills with a glass of orange juice or some other food that is high in vitamin C, such as citrus fruits. ¨ Iron pills may cause stomach problems, such as heartburn, nausea, diarrhea, constipation, and cramps. Be sure to drink plenty of fluids, and include fruits, vegetables, and fiber in your diet each day. Iron pills often make your bowel movements dark or green. ¨ If you forget to take an iron pill, do not take a double dose of iron the next time you take a pill. ¨ Keep iron pills out of the reach of small children. An overdose of iron can be very dangerous. · Follow your doctor's advice about eating iron-rich foods. These include red meat, shellfish, poultry, eggs, beans, raisins, whole-grain bread, and leafy green vegetables. · Steam vegetables to help them keep their iron content. When should you call for help? Call 911 anytime you think you may need emergency care. For example, call if: 
· You have symptoms of a heart attack. These may include: ¨ Chest pain or pressure, or a strange feeling in the chest. 
¨ Sweating. ¨ Shortness of breath. ¨ Nausea or vomiting. ¨ Pain, pressure, or a strange feeling in the back, neck, jaw, or upper belly or in one or both shoulders or arms. ¨ Lightheadedness or sudden weakness. ¨ A fast or irregular heartbeat. After you call 911, the  may tell you to chew 1 adult-strength or 2 to 4 low-dose aspirin. Wait for an ambulance. Do not try to drive yourself. · You passed out (lost consciousness). Call your doctor now or seek immediate medical care if: 
· You have new or increased shortness of breath. · You are dizzy or lightheaded, or you feel like you may faint. · Your fatigue and weakness continue or get worse. · You have any abnormal bleeding, such as: 
¨ Nosebleeds. ¨ Vaginal bleeding that is different (heavier, more frequent, at a different time of the month) than what you are used to. ¨ Bloody or black stools, or rectal bleeding. ¨ Bloody or pink urine. Watch closely for changes in your health, and be sure to contact your doctor if: 
· You do not get better as expected. Where can you learn more? Go to http://talia-anders.info/. Enter R301 in the search box to learn more about \"Anemia: Care Instructions. \" Current as of: October 13, 2016 Content Version: 11.3 © 3732-5950 [employer]. Care instructions adapted under license by [employer] (which disclaims liability or warranty for this information).  If you have questions about a medical condition or this instruction, always ask your healthcare professional. [place] [address] any warranty or liability for your use of this information. Patient armband removed and shredded DISCHARGE SUMMARY from Nurse The following personal items are in your possession at time of discharge: 
 
Dental Appliances: None Visual Aid: Glasses, At home Home Medications: None Jewelry: None Clothing: Undergarments, At bedside Other Valuables: At bedside, Cell Phone Personal Items Sent to Safe: none PATIENT INSTRUCTIONS: 
 
 
F-face looks uneven A-arms unable to move or move unevenly S-speech slurred or non-existent T-time-call 911 as soon as signs and symptoms begin-DO NOT go Back to bed or wait to see if you get better-TIME IS BRAIN. Warning Signs of HEART ATTACK Call 911 if you have these symptoms: 
? Chest discomfort. Most heart attacks involve discomfort in the center of the chest that lasts more than a few minutes, or that goes away and comes back. It can feel like uncomfortable pressure, squeezing, fullness, or pain. ? Discomfort in other areas of the upper body. Symptoms can include pain or discomfort in one or both arms, the back, neck, jaw, or stomach. ? Shortness of breath with or without chest discomfort. ? Other signs may include breaking out in a cold sweat, nausea, or lightheadedness. Don't wait more than five minutes to call [address]! Fast action can save your life. Calling 911 is almost always the fastest way to get lifesaving treatment. Emergency Medical Services staff can begin treatment when they arrive  up to an hour sooner than if someone gets to the hospital by car. The discharge information has been reviewed with the patient. The patient verbalized understanding. Discharge medications reviewed with the patient and appropriate educational materials and side effects teaching were provided. Discharge Orders None DanceTrippin Announcement We are excited to announce that we are making your provider's discharge notes available to you in DanceTrippin. You will see these notes when they are completed and signed by the physician that discharged you from your recent hospital stay. If you have any questions or concerns about any information you see in DanceTrippin, please call the Health Information Department where you were seen or reach out to your Primary Care Provider for more information about your plan of care. Introducing [facility]! Dear Elan Sen: Thank you for requesting a DanceTrippin account. Our records indicate that you already have an active DanceTrippin account. You can access your account anytime at [email or web address] Did you know that you can access your hospital and ER discharge instructions at any time in DanceTrippin? You can also review all of your test results from your hospital stay or ER visit. Additional Information If you have questions, please visit the Frequently Asked Questions section of the DanceTrippin website at [email or web address] Remember, DanceTrippin is NOT to be used for urgent needs. For medical emergencies, dial 911. Now available from your iPhone and Android! General Information Please provide this summary of care documentation to your next provider. Patient Signature:  ____________________________________________________________ Date:  ____________________________________________________________  
  
Brittany Vasquez Provider Signature:  ____________________________________________________________ Date:  ____________________________________________________________

## 2017-09-15 NOTE — PROGRESS NOTES
Patient received from ED, A&Ox4, in bed, awake. Pt does not appear to be in distress and denies any pain and/or discomfort. Safety measures taken include bed in lowest locked position, call bell within reach, and personal items at bedside within reach. Pt verbalizes understanding of use of call bell and the need to call for assistance to ensure safety. 1745: Paged Dr. Lee in regards to patient not wanting to take his Humalog and wanting to take his Novalog. He said it was fine and to have patient bring it in and verify it. 1955: Bedside and Verbal shift change report given to Ines RN and Karuna KHANNA (oncoming nurse) by Raffi Adams, Nurse Amol Capellan (offgoing nurse). Report included the following information SBAR, Kardex, Intake/Output, MAR and Recent Results.

## 2017-09-15 NOTE — IP AVS SNAPSHOT
Elsy Medrano 
 
 
 [address] [place] [address] 
[phone] Patient: Ann Ward MRN: [id number] BIZ:[id number] Current Discharge Medication List  
  
START taking these medications Dose & Instructions Dispensing Information Comments Morning Noon Evening Bedtime  
 fentaNYL 25 mcg/hr PATCH Commonly known as:  Ariella Stokes Your last dose was: Your next dose is:    
   
   
 Dose:  1 Patch 1 Patch by TransDERmal route every seventy-two (72) hours. Max Daily Amount: 1 Patch. Quantity:  5 Patch Refills:  0 CONTINUE these medications which have NOT CHANGED Dose & Instructions Dispensing Information Comments Morning Noon Evening Bedtime  
 amLODIPine 10 mg tablet Commonly known as:  Judy Díaz Your last dose was: Your next dose is:    
   
   
 Dose:  5 mg Take 5 mg by mouth daily. Refills:  0  
     
   
   
   
  
 cholecalciferol (VITAMIN D3) 5,000 unit Tab tablet Commonly known as:  VITAMIN D3 Your last dose was: Your next dose is:    
   
   
 Dose:  5000 Units Take 5,000 Units by mouth daily. Indications: PREVENTION OF VITAMIN D DEFICIENCY Refills:  0  
     
   
   
   
  
 cyclobenzaprine 10 mg tablet Commonly known as:  FLEXERIL Your last dose was: Your next dose is:    
   
   
  Refills:  0  
     
   
   
   
  
 erythromycin 500 mg tablet Your last dose was: Your next dose is: Take one tab at 3 PM, 5 PM, and 9 PM the day before surgery. Quantity:  3 Tab Refills:  0  
     
   
   
   
  
 gabapentin 400 mg capsule Commonly known as:  NEURONTIN Your last dose was: Your next dose is:    
   
   
 Dose:  800 mg Take 800 mg by mouth two (2) times a day. Refills:  0  
     
   
   
   
  
 insulin aspart 100 unit/mL injection Commonly known as:  Franck Carroll Your last dose was: Your next dose is:    
   
   
 Dose:  30 Units 30 Units by SubCUTAneous route Before breakfast, lunch, and dinner. Refills:  0  
     
   
   
   
  
 insulin detemir 100 unit/mL injection Commonly known as:  LEVEMIR Your last dose was: Your next dose is:    
   
   
 Dose:  20 Units 20 Units by SubCUTAneous route two (2) times a day. Refills:  0 Iron 325 mg (65 mg iron) tablet Generic drug:  ferrous sulfate Your last dose was: Your next dose is: Take  by mouth three (3) times daily (with meals). Refills:  0 LIPITOR 40 mg tablet Generic drug:  atorvastatin Your last dose was: Your next dose is:    
   
   
 Dose:  40 mg Take 40 mg by mouth daily. Refills:  0  
     
   
   
   
  
 losartan 100 mg tablet Commonly known as:  COZAAR Your last dose was: Your next dose is:    
   
   
 Dose:  100 mg Take 100 mg by mouth daily. Refills:  0  
     
   
   
   
  
 oxyCODONE-acetaminophen 5-325 mg per tablet Commonly known as:  PERCOCET Your last dose was: Your next dose is:    
   
   
 Dose:  1 Tab Take 1 Tab by mouth every four (4) hours as needed for Pain. Max Daily Amount: 6 Tabs. Quantity:  20 Tab Refills:  0  
     
   
   
   
  
 VITAMIN B-12 1,000 mcg tablet Generic drug:  cyanocobalamin Your last dose was: Your next dose is:    
   
   
 Dose:  4000 mcg Take 4,000 mcg by mouth daily. Refills:  0 STOP taking these medications PROTONIX 40 mg tablet Generic drug:  pantoprazole Where to Get Your Medications Information on where to get these meds will be given to you by the nurse or doctor. !  Ask your nurse or doctor about these medications  
  fentaNYL 25 mcg/hr PATCH

## 2017-09-15 NOTE — PROGRESS NOTES
Problem: Falls - Risk of  Goal: *Absence of Falls  Document Maryse Fall Risk and appropriate interventions in the flowsheet.   Outcome: Progressing Towards Goal  Fall Risk Interventions:

## 2017-09-15 NOTE — ED PROVIDER NOTES
HPI Comments: 1:32 PM        Marcia Maya is a 50 y.o. Male with PMHx of Colon Cancer, Sleep Apnea and HTN presents to the ED with a chief complaint of abd pain for 2 weeks. Pt states a CT was performed at [facility] this morning. Patient's wife states CT had portal vein thrombosis. Pt states abdominal cramps for 2 weeks that got worse yesterday and today. Pt states \"my abdomen looks like I'm pregnant and I thought it was a stomach bug.\" Pt reports not being able to hold down solids and liquids. Pt reports history of colon cancer and his wife states there was metastasis to liver and lungs. Pt denies fever, chills, cough, chest pain, SOB, N/V/D, diaphoresis or any other symptoms and complaints. The history is provided by the patient and the spouse. No  was used. Past Medical History:   Diagnosis Date    [facility] ([facility]     Diabetes ([facility]     Hypercholesteremia     Hypertension     Sleep apnea     C-PAP       Past Surgical History:   Procedure Laterality Date    COLONOSCOPY N/A 8/11/2016    COLONOSCOPY performed by Mable Mascorro MD at [address] [place] [address] ENDOSCOPY    HX APPENDECTOMY      HX TONSILLECTOMY           History reviewed. No pertinent family history. Social History     Social History    Marital status:      Spouse name: N/A    Number of children: N/A    Years of education: N/A     Occupational History    Not on file. Social History Main Topics    Smoking status: Never Smoker    Smokeless tobacco: Never Used    Alcohol use No    Drug use: No    Sexual activity: Not on file     Other Topics Concern    Not on file     Social History Narrative         ALLERGIES: Review of patient's allergies indicates no known allergies. Review of Systems   Constitutional: Positive for appetite change. Negative for activity change and fatigue. HENT: Negative for congestion and rhinorrhea. Eyes: Negative for visual disturbance.    Cardiovascular: Negative for palpitations. Skin: Negative for rash. Neurological: Negative for dizziness, weakness and light-headedness. Vitals:    09/15/17 1515 09/15/17 1530 09/15/17 1545 09/15/17 1600   BP: 117/66 117/67 116/68 120/66   Pulse:       Resp:       Temp:       SpO2: 96% 96% 95% 96%   Weight:       Height:                Physical Exam   Constitutional: He is oriented to person, place, and time. He appears well-developed and well-nourished. No distress. HENT:   Head: Normocephalic and atraumatic. Mouth/Throat: Oropharynx is clear and moist.   Eyes: Conjunctivae and EOM are normal. Pupils are equal, round, and reactive to light. No scleral icterus. Neck: Normal range of motion. Neck supple. Cardiovascular: Normal rate, regular rhythm and normal heart sounds. No murmur heard. Pulmonary/Chest: Effort normal and breath sounds normal. No respiratory distress. Abdominal: Soft. Bowel sounds are normal. He exhibits distension, fluid wave and ascites. Genitourinary: Rectal exam shows guaiac positive stool. Musculoskeletal: He exhibits edema. Edema:   2+ on right leg. 1+ on left leg. Lymphadenopathy:     He has no cervical adenopathy. Neurological: He is alert and oriented to person, place, and time. Coordination normal.   Skin: Skin is warm and dry. No rash noted. Psychiatric: He has a normal mood and affect. His behavior is normal.   Nursing note and vitals reviewed. MDM  Number of Diagnoses or Management Options  Elevated INR:   Low hemoglobin:   Lower GI bleed:   Portal vein thrombosis:   Diagnosis management comments: Referred to ED by PCP for portal vein thrombosis h/o colon ca with mets now with liver involvement ascites and lower ext edema increased liver mass by CT today.      Noted anemia rectal exam guaiac + noted mildly elevated inr believe due to liver failure will not start anticoagulation at present transfusion ordered Discussed with Dr Natalie Rdz and Dr Lee for admission        Amount and/or Complexity of Data Reviewed  Clinical lab tests: reviewed and ordered  Tests in the radiology section of CPT®: ordered and reviewed  Independent visualization of images, tracings, or specimens: yes    Risk of Complications, Morbidity, and/or Mortality  Presenting problems: high  Diagnostic procedures: moderate  Management options: moderate      ED Course       Procedures      Vitals:  Patient Vitals for the past 12 hrs:   Temp Pulse Resp BP SpO2   09/15/17 1600 - - - 120/66 96 %   09/15/17 1545 - - - 116/68 95 %   09/15/17 1530 - - - 117/67 96 %   09/15/17 1515 - - - 117/66 96 %   09/15/17 1500 - - - 108/59 96 %   09/15/17 1445 - - - 114/67 95 %   09/15/17 1430 - - - 115/72 97 %   09/15/17 1417 - - - - 99 %   09/15/17 1415 - - - 122/81 -   09/15/17 1310 - - - - 100 %   09/15/17 1301 97.9 °F (36.6 °C) (!) 102 18 (!) 140/93 99 %       Medications Ordered:  Medications   0.9% sodium chloride infusion (100 mL/hr IntraVENous Continued On Admission 9/15/17 1500)   0.9% sodium chloride infusion 250 mL (not administered)   amLODIPine (NORVASC) tablet 5 mg (not administered)   atorvastatin (LIPITOR) tablet 40 mg (not administered)   cyclobenzaprine (FLEXERIL) tablet 10 mg (not administered)   gabapentin (NEURONTIN) capsule 800 mg (not administered)   ferrous sulfate tablet 325 mg (not administered)   pantoprazole (PROTONIX) tablet 40 mg (not administered)   oxyCODONE-acetaminophen (PERCOCET) 5-325 mg per tablet 1 Tab (not administered)   0.9% sodium chloride infusion (125 mL/hr IntraVENous New Bag 9/15/17 1650)   morphine injection 5 mg (not administered)   ondansetron (ZOFRAN ODT) tablet 8 mg (not administered)   zolpidem (AMBIEN) tablet 5 mg (not administered)   insulin lispro (HUMALOG) injection (not administered)   glucose chewable tablet 16 g (not administered)   glucagon (GLUCAGEN) injection 1 mg (not administered)   dextrose (D50W) injection syrg 12.5-25 g (not administered)   enoxaparin (LOVENOX) injection 40 mg (not administered)   HYDROmorphone (PF) (DILAUDID) injection 1 mg (1 mg IntraVENous Given 9/15/17 1428)   ondansetron (ZOFRAN) injection 4 mg (4 mg IntraVENous Given 9/15/17 1428)       Lab Findings:  Recent Results (from the past 12 hour(s))   CBC WITH AUTOMATED DIFF    Collection Time: 09/15/17  1:55 PM   Result Value Ref Range    WBC 6.3 4.6 - 13.2 K/uL    RBC 2.94 (L) 4.70 - 5.50 M/uL    HGB 7.5 (L) 13.0 - 16.0 g/dL    HCT 22.8 (L) 36.0 - 48.0 %    MCV 77.6 74.0 - 97.0 FL    MCH 25.5 24.0 - 34.0 PG    MCHC 32.9 31.0 - 37.0 g/dL    RDW 16.9 (H) 11.6 - 14.5 %    PLATELET 581 639 - 413 K/uL    MPV 9.2 9.2 - 11.8 FL    NEUTROPHILS 74 (H) 40 - 73 %    LYMPHOCYTES 13 (L) 21 - 52 %    MONOCYTES 11 (H) 3 - 10 %    EOSINOPHILS 2 0 - 5 %    BASOPHILS 0 0 - 2 %    ABS. NEUTROPHILS 4.7 1.8 - 8.0 K/UL    ABS. LYMPHOCYTES 0.8 (L) 0.9 - 3.6 K/UL    ABS. MONOCYTES 0.7 0.05 - 1.2 K/UL    ABS. EOSINOPHILS 0.1 0.0 - 0.4 K/UL    ABS. BASOPHILS 0.0 0.0 - 0.06 K/UL    DF AUTOMATED     METABOLIC PANEL, COMPREHENSIVE    Collection Time: 09/15/17  1:55 PM   Result Value Ref Range    Sodium 135 (L) 136 - 145 mmol/L    Potassium 4.0 3.5 - 5.5 mmol/L    Chloride 97 (L) 100 - 108 mmol/L    CO2 24 21 - 32 mmol/L    Anion gap 14 3.0 - 18 mmol/L    Glucose 295 (H) 74 - 99 mg/dL    BUN 28 (H) 7.0 - 18 MG/DL    Creatinine 1.83 (H) 0.6 - 1.3 MG/DL    BUN/Creatinine ratio 15 12 - 20      GFR est AA 48 (L) >60 ml/min/1.73m2    GFR est non-AA 39 (L) >60 ml/min/1.73m2    Calcium 8.8 8.5 - 10.1 MG/DL    Bilirubin, total 0.8 0.2 - 1.0 MG/DL    ALT (SGPT) 15 (L) 16 - 61 U/L    AST (SGOT) 52 (H) 15 - 37 U/L    Alk.  phosphatase 388 (H) 45 - 117 U/L    Protein, total 7.6 6.4 - 8.2 g/dL    Albumin 2.6 (L) 3.4 - 5.0 g/dL    Globulin 5.0 (H) 2.0 - 4.0 g/dL    A-G Ratio 0.5 (L) 0.8 - 1.7     PROTHROMBIN TIME + INR    Collection Time: 09/15/17  1:55 PM   Result Value Ref Range    Prothrombin time 17.6 (H) 11.5 - 15.2 sec    INR 1.5 (H) 0.8 - 1.2     PTT    Collection Time: 09/15/17  1:55 PM   Result Value Ref Range    aPTT 39.7 (H) 23.0 - 36.4 SEC       EKG Interpretation by ED physician:      X-ray, CT or radiology findings or impressions:  No orders to display       Progress notes, consult notes, or additional procedure notes:  3:33 PM Consult: I discussed care with Dr. Lee (Hospitalist). It was a standard discussion including patient history, chief complaint, available diagnostic results, and predicted treatment course. Dr. Lee agrees with admission. 4:09 PM Consult: I discussed care with Dr. Man Wilson (Gastroenterologist). It was a standard discussion including patient history, chief complaint, available diagnostic results, and predicted treatment course. Dr. Man Wilson says patient is not a candidate for Colonoscopy. Refer to Oncology. Disposition: admit    Follow-up Information     None           Current Discharge Medication List      CONTINUE these medications which have NOT CHANGED    Details   atorvastatin (LIPITOR) 40 mg tablet Take 40 mg by mouth daily. ferrous sulfate (IRON) 325 mg (65 mg iron) tablet Take  by mouth three (3) times daily (with meals). cyanocobalamin (VITAMIN B-12) 1,000 mcg tablet Take 4,000 mcg by mouth daily. cholecalciferol, VITAMIN D3, (VITAMIN D3) 5,000 unit tab tablet Take 5,000 Units by mouth daily. Indications: PREVENTION OF VITAMIN D DEFICIENCY      pantoprazole (PROTONIX) 40 mg tablet Take 40 mg by mouth daily. oxyCODONE-acetaminophen (PERCOCET) 5-325 mg per tablet Take 1 Tab by mouth every four (4) hours as needed for Pain. Max Daily Amount: 6 Tabs. Qty: 20 Tab, Refills: 0      erythromycin 500 mg tablet Take one tab at 3 PM, 5 PM, and 9 PM the day before surgery. Qty: 3 Tab, Refills: 0    Associated Diagnoses: Colon carcinoma ([address] Iron deficiency anemia due to chronic blood loss      losartan (COZAAR) 100 mg tablet Take 100 mg by mouth daily.       amLODIPine (NORVASC) 10 mg tablet Take 5 mg by mouth daily. insulin aspart (NOVOLOG) 100 unit/mL injection 30 Units by SubCUTAneous route Before breakfast, lunch, and dinner. insulin detemir (LEVEMIR) 100 unit/mL injection 20 Units by SubCUTAneous route two (2) times a day. cyclobenzaprine (FLEXERIL) 10 mg tablet       gabapentin (NEURONTIN) 400 mg capsule Take 800 mg by mouth two (2) times a day. Scribe Attestation      Mauricio Nowak acting as a scribe for and in the presence of Carole Menendez MD      September 15, 2017 at 1:38 PM       Provider Attestation:      I personally performed the services described in the documentation, reviewed the documentation, as recorded by the scribe in my presence, and it accurately and completely records my words and actions.  September 15, 2017 at 1:38 PM - Carole Menendze MD

## 2017-09-15 NOTE — H&P
History and Physical    Patient: Noe Arteaga               Sex: male          DOA: 9/15/2017       YOB: 1965      Age:  50 y.o. Assessment/Plan     Anemia - guiac positive. Acute on chronic 7.5 . Probable slow GI bleed from cancer. Transfuse 1u PRBC  Liver lesion- probable progressed metastatic colon CA - to liver 10cm mass. Has had chemo in past. GI   New ascites and significant abdominal lymphadenomapthy - unsure of utility of cytology on abdominal fluid. -VOA to consult for any palliative treatment options. PVT - likely due to large liver mass. GI consulted recommend no treatment, anticoagulation would be risky   MYRON- 1->1.8 -IVF no renal involvement seen on CT. IVF @ 150cc/hr  DM-gluose 295. On odd home regimen of 30u novolog AC but not using levemir due to am lows. Follows with endo  MANNY -CPAP   Hx of Colon CA with L colectomy and 8 rounds of chemotherapy- dates unk. was thought to have resolved Colon Ca. Moderate malnutrition due to emil loss and caloric intake    Code status: Full-discussed 0% chance of survival.   PPX:  DVT: LMWH   GI: None indicated    HPI:     Chief Complaint   Patient presents with    Abdominal Pain    Other       Noe Arteaga is a 50 y.o. male with PMHX of Colon CA s/p hemicolectomy, DM, MANNY who presents from home after recent CT abd. He reports GI flu like illness for 2 weeks with poor appetite and worse abdominal swelling. He had a CT abd today at [facility] and was sent to ER immediately once results were seen. He denies any black or bloody stools. however has had flu like illness since with persistent N/V/D and anorexia. Denies fevers. Planned liver biopsy Aug 30th for known small liver lesions which has been on hold due to presumed illness.   He gets all his cancer care in [place] with [facility]\" They had a bad interaction with Dr Lynne Palomo and wish to avoid him at all cost.  Recently admitted 6/25 to 7/1 for sepsis. In ER vitals normal. Hg 7.5, Cr 1.8, Guiac positive. GI consulted but had nothing further to offer. 1u PRBC ordered IVF started. Family deciding whether to go back to [place] or see an oncologist locally. Reveiwed the CT report with the patient that has unfortunate findings of abdominal lymphadenopathy, ascites, larger liver lesion, and portal vein thrombosis. They seemed shocked that any cancer could be present. Past Medical History:   Diagnosis Date    [facility] ([facility]     Diabetes ([facility]     Hypercholesteremia     Hypertension     Sleep apnea     C-PAP       Prior to Admission Medications   Prescriptions Last Dose Informant Patient Reported? Taking? PEG 3350-Electrolytes (COLYTE) 240-22.72-6.72 -5.84 gram solution   No No   Sig: Take as directed   amLODIPine (NORVASC) 10 mg tablet   Yes No   Sig: Take 5 mg by mouth daily. atorvastatin (LIPITOR) 10 mg tablet   Yes No   Sig: Take 40 mg by mouth daily. canagliflozin (INVOKANA) 300 mg tablet   Yes No   Sig: Take 300 mg by mouth Daily (before breakfast). cholecalciferol, VITAMIN D3, (VITAMIN D3) 5,000 unit tab tablet   Yes No   Sig: Take 5,000 Units by mouth daily. Indications: PREVENTION OF VITAMIN D DEFICIENCY   cyclobenzaprine (FLEXERIL) 10 mg tablet   Yes No   erythromycin 500 mg tablet   No No   Sig: Take one tab at 3 PM, 5 PM, and 9 PM the day before surgery. gabapentin (NEURONTIN) 400 mg capsule   Yes No   Sig: Take 400 mg by mouth as needed. insulin aspart (NOVOLOG) 100 unit/mL injection   Yes No   Sig: by SubCUTAneous route. insulin detemir (LEVEMIR) 100 unit/mL injection   Yes No   Sig: by SubCUTAneous route nightly. losartan (COZAAR) 100 mg tablet   Yes No   Sig: Take 100 mg by mouth daily. neomycin (MYCIFRADIN) 500 mg tablet   No No   Sig: Take two tabs at 3 PM, 5 PM, and 9 PM the day before surgery.    oxyCODONE-acetaminophen (PERCOCET) 5-325 mg per tablet   No No   Sig: Take 1 Tab by mouth every four (4) hours as needed for Pain. Max Daily Amount: 6 Tabs. pantoprazole (PROTONIX) 40 mg tablet   Yes No   Sig: Take 40 mg by mouth daily. Facility-Administered Medications: None       Social History:  Social History     Social History    Marital status:      Spouse name: N/A    Number of children: N/A    Years of education: N/A     Occupational History    Not on file. Social History Main Topics    Smoking status: Never Smoker    Smokeless tobacco: Never Used    Alcohol use No    Drug use: No    Sexual activity: Not on file     Other Topics Concern    Not on file     Social History Narrative       Family History:  History reviewed. No pertinent family history. Surgical History:  Past Surgical History:   Procedure Laterality Date    COLONOSCOPY N/A 8/11/2016    COLONOSCOPY performed by Yung Robb MD at [address] HX APPENDECTOMY      HX TONSILLECTOMY         Review of Systems  Constitutional:  + fever + weight loss  HEENT:  No headache or visual changes  Cardiovascular:  No chest pain or diaphoresis  Respiratory:  No coughing, wheezing, or shortness of breath. GI:  HPI  :  No hematuria or dysuria  Skin:  No rashes or moles  Neuro:  No seizures or syncope  Hematological:  No bruising or bleeding  Endocrine:  + diabetes or thyroid disease    Physical Exam:      Visit Vitals    BP (!) 140/93 (BP 1 Location: Right arm, BP Patient Position: At rest)    Pulse (!) 102    Temp 97.9 °F (36.6 °C)    Resp 18    Ht 5' 9\" (1.753 m)    Wt 83 kg (183 lb)    SpO2 99%    BMI 27.02 kg/m2       Physical Exam:  Gen:  No distress, alert, pale  HEENT:  Normal cephalic atraumatic, extra-occular movements are intact. Neck:  Supple, No JVD  Lungs:  Clear bilaterally, no wheeze, no rales, normal effort  Heart:  Regular Rate and Rhythm, normal S1 and S2, no edema  Abdomen:  Soft, mild tenderness, distended abd with trace ascites wave, normal bowel sounds, no guarding.   Extremities:  Well perfused, no cyanosis or edema  Neurological:  Awake and alert, CN's are intact, normal strength throughout extremities  Skin:  No rashes or moles  Psych:  Normal thought process, does not appear anxious    Laboratory Studies: All lab results for the last 24 hours reviewed. Recent Results (from the past 12 hour(s))   CBC WITH AUTOMATED DIFF    Collection Time: 09/15/17  1:55 PM   Result Value Ref Range    WBC 6.3 4.6 - 13.2 K/uL    RBC 2.94 (L) 4.70 - 5.50 M/uL    HGB 7.5 (L) 13.0 - 16.0 g/dL    HCT 22.8 (L) 36.0 - 48.0 %    MCV 77.6 74.0 - 97.0 FL    MCH 25.5 24.0 - 34.0 PG    MCHC 32.9 31.0 - 37.0 g/dL    RDW 16.9 (H) 11.6 - 14.5 %    PLATELET 703 007 - 093 K/uL    MPV 9.2 9.2 - 11.8 FL    NEUTROPHILS 74 (H) 40 - 73 %    LYMPHOCYTES 13 (L) 21 - 52 %    MONOCYTES 11 (H) 3 - 10 %    EOSINOPHILS 2 0 - 5 %    BASOPHILS 0 0 - 2 %    ABS. NEUTROPHILS 4.7 1.8 - 8.0 K/UL    ABS. LYMPHOCYTES 0.8 (L) 0.9 - 3.6 K/UL    ABS. MONOCYTES 0.7 0.05 - 1.2 K/UL    ABS. EOSINOPHILS 0.1 0.0 - 0.4 K/UL    ABS. BASOPHILS 0.0 0.0 - 0.06 K/UL    DF AUTOMATED     METABOLIC PANEL, COMPREHENSIVE    Collection Time: 09/15/17  1:55 PM   Result Value Ref Range    Sodium 135 (L) 136 - 145 mmol/L    Potassium 4.0 3.5 - 5.5 mmol/L    Chloride 97 (L) 100 - 108 mmol/L    CO2 24 21 - 32 mmol/L    Anion gap 14 3.0 - 18 mmol/L    Glucose 295 (H) 74 - 99 mg/dL    BUN 28 (H) 7.0 - 18 MG/DL    Creatinine 1.83 (H) 0.6 - 1.3 MG/DL    BUN/Creatinine ratio 15 12 - 20      GFR est AA 48 (L) >60 ml/min/1.73m2    GFR est non-AA 39 (L) >60 ml/min/1.73m2    Calcium 8.8 8.5 - 10.1 MG/DL    Bilirubin, total 0.8 0.2 - 1.0 MG/DL    ALT (SGPT) 15 (L) 16 - 61 U/L    AST (SGOT) 52 (H) 15 - 37 U/L    Alk.  phosphatase 388 (H) 45 - 117 U/L    Protein, total 7.6 6.4 - 8.2 g/dL    Albumin 2.6 (L) 3.4 - 5.0 g/dL    Globulin 5.0 (H) 2.0 - 4.0 g/dL    A-G Ratio 0.5 (L) 0.8 - 1.7     PROTHROMBIN TIME + INR    Collection Time: 09/15/17  1:55 PM   Result Value Ref Range    Prothrombin time 17.6 (H) 11.5 - 15.2 sec    INR 1.5 (H) 0.8 - 1.2     PTT    Collection Time: 09/15/17  1:55 PM   Result Value Ref Range    aPTT 39.7 (H) 23.0 - 36.4 SEC       Rad:  [facility] CT Abd - 10cm liver lesion, PVT, lymphadenopathy involving most of abdomen with new onset ascites.

## 2017-09-16 LAB
ABO + RH BLD: NORMAL
ANION GAP SERPL CALC-SCNC: 13 MMOL/L (ref 3–18)
BLD PROD TYP BPU: NORMAL
BLOOD GROUP ANTIBODIES SERPL: NORMAL
BPU ID: NORMAL
BUN SERPL-MCNC: 21 MG/DL (ref 7–18)
BUN/CREAT SERPL: 15 (ref 12–20)
CALCIUM SERPL-MCNC: 7.6 MG/DL (ref 8.5–10.1)
CALLED TO:,BCALL1: NORMAL
CHLORIDE SERPL-SCNC: 103 MMOL/L (ref 100–108)
CO2 SERPL-SCNC: 22 MMOL/L (ref 21–32)
CREAT SERPL-MCNC: 1.42 MG/DL (ref 0.6–1.3)
CROSSMATCH RESULT,%XM: NORMAL
ERYTHROCYTE [DISTWIDTH] IN BLOOD BY AUTOMATED COUNT: 16.6 % (ref 11.6–14.5)
GLUCOSE BLD STRIP.AUTO-MCNC: 104 MG/DL (ref 70–110)
GLUCOSE BLD STRIP.AUTO-MCNC: 112 MG/DL (ref 70–110)
GLUCOSE BLD STRIP.AUTO-MCNC: 147 MG/DL (ref 70–110)
GLUCOSE BLD STRIP.AUTO-MCNC: 204 MG/DL (ref 70–110)
GLUCOSE SERPL-MCNC: 229 MG/DL (ref 74–99)
HCT VFR BLD AUTO: 28 % (ref 36–48)
HGB BLD-MCNC: 9.1 G/DL (ref 13–16)
MCH RBC QN AUTO: 25.9 PG (ref 24–34)
MCHC RBC AUTO-ENTMCNC: 32.5 G/DL (ref 31–37)
MCV RBC AUTO: 79.5 FL (ref 74–97)
PLATELET # BLD AUTO: 256 K/UL (ref 135–420)
PMV BLD AUTO: 9.6 FL (ref 9.2–11.8)
POTASSIUM SERPL-SCNC: 4.2 MMOL/L (ref 3.5–5.5)
RBC # BLD AUTO: 3.52 M/UL (ref 4.7–5.5)
SODIUM SERPL-SCNC: 138 MMOL/L (ref 136–145)
SPECIMEN EXP DATE BLD: NORMAL
STATUS OF UNIT,%ST: NORMAL
UNIT DIVISION, %UDIV: 0
WBC # BLD AUTO: 5.7 K/UL (ref 4.6–13.2)

## 2017-09-16 PROCEDURE — 82962 GLUCOSE BLOOD TEST: CPT

## 2017-09-16 PROCEDURE — 65270000029 HC RM PRIVATE

## 2017-09-16 PROCEDURE — 85027 COMPLETE CBC AUTOMATED: CPT | Performed by: INTERNAL MEDICINE

## 2017-09-16 PROCEDURE — 74011250637 HC RX REV CODE- 250/637: Performed by: INTERNAL MEDICINE

## 2017-09-16 PROCEDURE — 74011250636 HC RX REV CODE- 250/636: Performed by: INTERNAL MEDICINE

## 2017-09-16 PROCEDURE — 80048 BASIC METABOLIC PNL TOTAL CA: CPT | Performed by: INTERNAL MEDICINE

## 2017-09-16 PROCEDURE — 77030020263 HC SOL INJ SOD CL0.9% LFCR 1000ML

## 2017-09-16 PROCEDURE — 36415 COLL VENOUS BLD VENIPUNCTURE: CPT | Performed by: INTERNAL MEDICINE

## 2017-09-16 RX ADMIN — ATORVASTATIN CALCIUM 40 MG: 40 TABLET, FILM COATED ORAL at 09:56

## 2017-09-16 RX ADMIN — ENOXAPARIN SODIUM 40 MG: 40 INJECTION SUBCUTANEOUS at 18:22

## 2017-09-16 RX ADMIN — GABAPENTIN 800 MG: 400 CAPSULE ORAL at 09:57

## 2017-09-16 RX ADMIN — ONDANSETRON 8 MG: 4 TABLET, ORALLY DISINTEGRATING ORAL at 21:35

## 2017-09-16 RX ADMIN — CYCLOBENZAPRINE HYDROCHLORIDE 10 MG: 10 TABLET, FILM COATED ORAL at 21:35

## 2017-09-16 RX ADMIN — SODIUM CHLORIDE 125 ML/HR: 900 INJECTION, SOLUTION INTRAVENOUS at 22:47

## 2017-09-16 RX ADMIN — FERROUS SULFATE TAB 325 MG (65 MG ELEMENTAL FE) 325 MG: 325 (65 FE) TAB at 16:54

## 2017-09-16 RX ADMIN — FERROUS SULFATE TAB 325 MG (65 MG ELEMENTAL FE) 325 MG: 325 (65 FE) TAB at 11:29

## 2017-09-16 RX ADMIN — CYCLOBENZAPRINE HYDROCHLORIDE 10 MG: 10 TABLET, FILM COATED ORAL at 09:56

## 2017-09-16 RX ADMIN — INSULIN ASPART 25 UNITS: 100 INJECTION, SOLUTION INTRAVENOUS; SUBCUTANEOUS at 09:58

## 2017-09-16 RX ADMIN — MORPHINE SULFATE 5 MG: 10 INJECTION INTRAMUSCULAR; INTRAVENOUS; SUBCUTANEOUS at 21:29

## 2017-09-16 RX ADMIN — PANTOPRAZOLE SODIUM 40 MG: 40 TABLET, DELAYED RELEASE ORAL at 16:54

## 2017-09-16 RX ADMIN — CYCLOBENZAPRINE HYDROCHLORIDE 10 MG: 10 TABLET, FILM COATED ORAL at 16:54

## 2017-09-16 RX ADMIN — MORPHINE SULFATE 5 MG: 10 INJECTION INTRAMUSCULAR; INTRAVENOUS; SUBCUTANEOUS at 05:39

## 2017-09-16 RX ADMIN — MORPHINE SULFATE 5 MG: 10 INJECTION INTRAMUSCULAR; INTRAVENOUS; SUBCUTANEOUS at 12:42

## 2017-09-16 RX ADMIN — ONDANSETRON 8 MG: 4 TABLET, ORALLY DISINTEGRATING ORAL at 11:29

## 2017-09-16 RX ADMIN — SODIUM CHLORIDE 125 ML/HR: 900 INJECTION, SOLUTION INTRAVENOUS at 14:08

## 2017-09-16 RX ADMIN — SODIUM CHLORIDE 125 ML/HR: 900 INJECTION, SOLUTION INTRAVENOUS at 05:33

## 2017-09-16 RX ADMIN — MORPHINE SULFATE 5 MG: 10 INJECTION INTRAMUSCULAR; INTRAVENOUS; SUBCUTANEOUS at 16:57

## 2017-09-16 RX ADMIN — AMLODIPINE BESYLATE 5 MG: 5 TABLET ORAL at 09:56

## 2017-09-16 RX ADMIN — PANTOPRAZOLE SODIUM 40 MG: 40 TABLET, DELAYED RELEASE ORAL at 09:56

## 2017-09-16 RX ADMIN — FERROUS SULFATE TAB 325 MG (65 MG ELEMENTAL FE) 325 MG: 325 (65 FE) TAB at 09:56

## 2017-09-16 NOTE — PROGRESS NOTES
conducted an initial consultation and Spiritual Assessment for Fredi Mathur, who is a 50 y.o.,male. Patients Primary Language is: [place]. According to the patients EMR Jain Affiliation is: No Roman Catholic. The reason the Patient came to the hospital is:   Patient Active Problem List    Diagnosis Date Noted    Metastatic colon cancer to liver ([facility] 09/15/2017    Sepsis ([facility] 06/25/2017    Hypokalemia 06/25/2017    Lactic acidosis 06/25/2017    Leukocytosis 06/25/2017    MYRON (acute kidney injury) ([facility] 06/25/2017    Colon carcinoma ([facility] 08/17/2016    Anemia 08/08/2016    GI bleeding 08/08/2016    Diabetes ([facility] 08/08/2016    Hypertension 08/08/2016    Sleep apnea 08/08/2016    Other and unspecified noninfectious gastroenteritis and colitis 08/17/2013    Renal insufficiency 08/17/2013    Hyperglycemia 08/17/2013    DM (diabetes mellitus) ([facility] 08/17/2013    Low magnesium levels 08/17/2013    Nausea with vomiting 08/16/2013    Rash 08/16/2013        The  provided the following Interventions:  Initiated a relationship of care and support. Explored issues of sage, belief, spirituality and Mandaen/ritual needs while hospitalized. Listened empathically. Provided information about Spiritual Care Services. Offered prayer and assurance of continued prayers on patient's behalf. Chart reviewed. The following outcomes were achieved:  Patient shared limited information about both their medical narrative and spiritual journey/beliefs. Patient processed feeling about current hospitalization. Patient expressed gratitude for 's visit. Assessment:  Patient does not have any Mandaen/cultural needs that will affect patients preferences in health care. There are no further spiritual or Mandaen issues which require intervention at this time.      Plan:  Chaplains will continue to follow and will provide pastoral care on an as needed/requested basis.   recommends bedside caregivers page  on duty if patient shows signs of acute spiritual or emotional distress. Mariza Padron M.Div.   [facility]  [phone]

## 2017-09-16 NOTE — PROGRESS NOTES
Problem: Falls - Risk of  Goal: *Absence of Falls  Document Maryse Fall Risk and appropriate interventions in the flowsheet. 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

## 2017-09-16 NOTE — PROGRESS NOTES
Daily Progress Note: 9/16/2017 2:15 PM   Admit Date: 9/15/2017    Patient seen in follow up for multiple medical problems as listed below:  Patient Active Problem List   Diagnosis Code    Nausea with vomiting R11.2    Rash R21    Other and unspecified noninfectious gastroenteritis and colitis K52.9    Renal insufficiency N28.9    Hyperglycemia R73.9    DM (diabetes mellitus) ([facility] E11.9    Low magnesium levels R79.0    Anemia D64.9    GI bleeding K92.2    Diabetes ([facility] E11.9    Hypertension I10    Sleep apnea G47.30    Colon carcinoma ([facility] C18.9    [facility]eps[facility] A41.9    Hypokalemia E87.6    Lactic acidosis E87.2    Leukocytosis D72.829    MYRON (acute kidney injury) ([facility] N17.9    Metastatic colon cancer to liver (HCC) C18.9, C78.7       Assesment     50 y.o. male with PMHX of Colon CA s/p hemicolectomy, DM, MANNY who presents from home after recent CT abd. He reports GI flu like illness for 2 weeks with poor appetite and worse abdominal swelling. In ER vitals normal. Hg 7.5, Cr 1.8, Guiac positive. GI consulted but had nothing further to offer. 1u PRBC ordered IVF started. Family deciding whether to go back to [place] or see an oncologist locally, they have decided to reach out to [facility]. 15. Reveiwed the CT report with the patient that has unfortunate findings of abdominal lymphadenopathy, ascites, larger liver lesion, and portal vein thrombosis on 9/15. Anemia - guiac positive. Acute on chronic 7.5 . Probable slow GI bleed from cancer. Transfuse 1u PRBC now 9.1 on 9/16  Liver lesion- probable progressed metastatic colon CA - to liver 10cm mass. Has had chemo in past. GI   New ascites and significant abdominal lymphadenomapthy - unsure of utility of cytology on abdominal fluid. -VOA to consult for any palliative treatment options. PVT - likely due to large liver mass.  GI consulted recommend no treatment, anticoagulation would be risky   MYRON- 1->1.8 -IVF no renal involvement seen on CT. IVF @ 150cc/hr. Cr to 1.4  DM-gluose 295. On odd home regimen of 30u novolog AC but not using levemir due to am lows. Follows with endo  MANNY -CPAP   Hx of Colon CA with L colectomy and 8 rounds of chemotherapy- dates unk. was thought to have resolved Colon Ca. Moderate malnutrition due to emil loss and caloric intake     Code status: Full-discussed 0% chance of survival.   PPX:  DVT: LMWH   GI: None indicated    DVT Protocol Active: yes  Code Status:  Full Code     Disposition: unk    Subjective:     CC: Abdominal Pain and Other    Interval History: Awaiting any oncology plans. Hg up to 9.1 Cr to 1.4. Some nausea with breakfast    ROS: 11 point ROS negative except for anorexia and abd swelling    Objective:     Visit Vitals    /73    Pulse 80    Temp 98.5 °F (36.9 °C)    Resp 18    Ht 5' 9\" (1.753 m)    Wt 84.6 kg (186 lb 8.2 oz)    SpO2 90%    BMI 27.54 kg/m2       Temp (24hrs), Av.3 °F (36.8 °C), Min:97.9 °F (36.6 °C), Max:98.9 °F (37.2 °C)        Intake/Output Summary (Last 24 hours) at 17 1415  Last data filed at 17 1255   Gross per 24 hour   Intake          1598.33 ml   Output               30 ml   Net          1568.33 ml       Gen: AOx3, NAD  HEENT:  VAZQUEZ, EOMI. Neck: No Bruits/JVD   Lungs:   CTAB. Good respiratory effort  Heart:   RR S1 S2 without M/R/G  Abdomen: distended, global tenderness, BSX4,   Extremities:   trace LE edema. No cyanosis.   Skin:  no jaundice/lesions      Data Review:     Meds/Labs/Tests reviewed    Current Shift:   07 - 1900  In: -   Out: 30   Last three shifts:  1901 -  07  In: 1598.3 [I.V.:1598.3]  Out: -   Recent Labs      17   0453  09/15/17   1355   WBC  5.7  6.3   RBC  3.52*  2.94*   HGB  9.1*  7.5*   HCT  28.0*  22.8*   PLT  256  266   GRANS   --   74*   LYMPH   --   13*   EOS   --   2       Recent Labs      17   0453  09/15/17   1355   BUN  21*  28*   CREA  1.42*  1.83*   CA  7.6*  8.8   ALB   --   2.6*   K  4.2 4.0   NA  138  135*   CL  103  97*   CO2  22  24   GLU  229*  295*        Lab Results   Component Value Date/Time    Glucose 229 09/16/2017 04:53 AM    Glucose 295 09/15/2017 01:55 PM    Glucose 86 07/02/2017 07:46 AM    Glucose 81 07/01/2017 04:12 AM    Glucose 89 06/29/2017 02:40 AM          Care coordination with Nursing/Consultants/staff: 15  Prior history, labs, and charting reviewed: 15    Procedures/Imaging:  OSH CT Abd 9/15    Total time spent with chart review, patient examination/education, discussion with staff on case,documentation and medication management / adjustment  :  30 Minutes      Dr Brandan Redmond  [phone]

## 2017-09-16 NOTE — PROGRESS NOTES
49 yo with colon ca s/p hemicolectomy. Also hx of DM, MANNY. Recent imaging studies reveal liver mets and portal vein thrombosis. Patient is known to physicians at [address] [facility] [address] where he received adjuvant chemotherapy. Now admitted with increasing abdo distension. Pmhx  Colon ca  DM  HTN    PE    [facility] RR  pulm clear  Abdo distended    Imp/    1/ Likely met colon cancer  2/ Liver mets  3/ Ascites  4/ Portal vein thrombosis    Recommendation    1/ Patient needs CT guided biopsy of liver lesion  2/ He informs me that he would like treatment at [address] [facility] in [place]  3/ Ideally he needs to be anticoagulated, but requires biopsy. Can consider Lovenox as renal function improving. We would need to hold Lovenox 24 hours prior to biopsy.   4/ Ok to discharge patient so that he can follow up ASAP with his o/p oncologist    Available as needed    Please anand my cell with questions    Henrik Casillas MD  [phone]

## 2017-09-16 NOTE — PROGRESS NOTES
[facility]   Discharge Planning/ Assessment    Reasons for Intervention: Chart reviewed and pt verified demographics. He has [facility] and Dr Raheem Love is his PCP, last visit 2 days ago. He lives with wife Rachel Arce [phone] will drive and participate in dc process. He is independent with ADL, never had home health. He does have CPAP at home. Plan is  Home.     High Risk Criteria  [x] Yes  []No   Physician Referral  [] Yes  [x]No        Date    Nursing Referral  [] Yes  [x]No        Date    Patient/Family Request  [] Yes  [x]No        Date       Resources:    Medicare  [] Yes  [x]No   Medicaid  [] Yes  [x]No   No Resources  [] Yes  [x]No   Private Insurance  [x] Yes  []No    Name/Phone Number    Other  [] Yes  [x]No        (i.e. Workman's Comp)         Prior Services:    Prior Services  [] Yes  [x]No   Home Health  [] Yes  [x]No   [address] [place]  [] Yes  [x]No        [facility] [address] Program  [] Yes  [x]No       Meals on Wheels  [] Yes  [x]No   Office on Aging  [] Yes  [x]No   Transportation Services  [] Yes  [x]No   Nursing Home  [] Yes  [x]No        Nursing Home Name    [address]  [] Yes  [x]No        [address] Name    Other       Information Source:      Information obtained from  [x] Patient  [] Parent   [] [address]   [] Child  [] Spouse   [] Significant Other/Partner   [] Friend      [] EMS    [] Nursing Home Chart          [] Other:   Chart Review  [x] Yes  []No     Family/Support System:    Patient lives with  [] Alone    [x] Spouse   [] Significant Other  [] Children  [] Caretaker   [] Parent  [] Sibling     [] Other       Other Support System:    Is the patient responsible for care of others  [] Yes  [x]No   Information of person caring for patient on  discharge    Managers financial affairs independently  [x] Yes  []No   If no, explain:      Status Prior to Admission:    Mental Status  [x] Awake  [] Alert  [] Oriented  [] Quiet/Calm [] Lethargic/Sedated   [] Disoriented  [] Restless/Anxious  [] Combative   Personal Care  [] Dependent  [x] Independent Personal Care  [] Requires Assistance   Meal Preparation Ability  [x] Independent   [] Standby Assistance   [] Minimal Assistance   [] Moderate Assistance  [] Maximum Assistance     [] Total Assistance   Chores  [x] Independent with Chores   [] N/A Nursing Home Resident   [] Requires Assistance   Bowel/Bladder  [x] Continent  [] Catheter  [] Incontinent  [] Ostomy Self-Care    [] Urine Diversion Self-Care  [] Maximum Assistance     [] Total Assistance   Number of Persons needed for assistance    DME at home  [] [address], Henrietta Render  [] [address] [facility], [address]ight   [] Commode    [] Bathroom/Grab Bars  [] Hospital Bed  [] Nebulizer  [] Oxygen           [] Raised Toilet Seat  [] Shower Chair  [] Side Rails for Bed   [] Tub Transfer Bench   [] Karen Girt  [] Mellisa Gonzalez, Standard      [] Other:   Vendor      Treatment Presently Receiving:    Current Treatments  [] Chemotherapy  [] Dialysis  [] Insulin  [] IVAB [x] IVF   [] O2  [] PCA   [] PT   [] RT   [] Tube Feedings   [] Wound Care     Psychosocial Evaluation:    Verbalized Knowledge of Disease Process  [] Patient  []Family   Coping with Disease Process  [] Patient  []Family   Requires Further Counseling Coping with Disease Process  [] Patient  []Family     Identified Projected Needs:    Home Health Aid  [] Yes  [x]No   Transportation  [] Yes  [x]No   Education  [] Yes  [x]No        Specific Education     Financial Counseling  [] Yes  [x]No   Inability to Care for Self/Will Require 24 hour care  [] Yes  [x]No   Pain Management  [] Yes  [x]No   Home Infusion Therapy  [] Yes  [x]No   Oxygen Therapy  [] Yes  [x]No   DME  [] Yes  [x]No   Long Term Care Placement  [] Yes  [x]No   Rehab  [] Yes  [x]No   Physical Therapy  [] Yes  [x]No   Needs Anticipated At This Time  [] Yes  [x]No     Intra-Hospital Referral:    [facility]  [] Yes  [x]No   Life   [] Yes  [x]No   Patient Representative  [] Yes  [x]No   Staff for Teaching Needs  [] Yes  [x]No   Specialty Teaching Needs     Diabetic Educator  [] Yes  [x]No   Referral for Diabetic Educator Needed  [] Yes  [x]No  If Yes, place order for Nutritionist or Diabetic Consult     Tentative Discharge Plan:    Home with No Services  [x] Yes  []No   Home with [address]  [] Yes  [x]No        If Yes, specify type    Home Care Program  [] Yes  [x]No        If Yes, specify type    Meals on Wheels  [] Yes  [x]No   Office of Aging  [] Yes  [x]No   NHP  [] Yes  [x]No   Return to the Nursing Home  [] Yes  [x]No   Rehab Therapy  [] Yes  [x]No   Acute Rehab  [] Yes  [x]No   Subacute Rehab  [] Yes  [x]No   Private Care  [] Yes  [x]No   Substance Abuse Referral  [] Yes  [x]No   Transportation  [] Yes  [x]No   Chore Service  [] Yes  [x]No   Inpatient Hospice  [] Yes  [x]No   OP RT  [] Yes  [x] No   OP Hemo  [] Yes  [x] No   OP PT  [] Yes  [x]No   Support Group  [] Yes  [x]No   Reach to Recovery  [] Yes  [x]No   OP Oncology Clinic  [] Yes  [x]No   Clinic Appointment  [] Yes  [x]No   DME  [] Yes  [x]No   Comments    Name of D/C Planner or  Given to Patient or Family Coit Jeaning. Herrera Du   Phone Number         Extension [phone]   Date 09/16/17   Time    If you are discharged home, whom do you designate to participate in your discharge plan and receive any information needed?      Enter name of designee wife        Phone # of designee         Address of designee         Updated         Patient refused to designate any           individual

## 2017-09-16 NOTE — PROGRESS NOTES
Pts plan of care according to Dr. Trenton Garrett is to be discharged tomorrow on 9/17/17 to drive up to [place] for tx at the [facility]. .. Lovenox needs to be held 24hrs prior to a liver biopsy that would be performed possibly tomorrow or Monday. Pt and his wife also requested for the Lovenox to be held until they fully understand the pts new plan of care and schedule for the liver biopsy.

## 2017-09-16 NOTE — ACP (ADVANCE CARE PLANNING)
Patient has designated ________wife________________ to participate in his/her discharge plan and to receive any needed information.      Name: Cinthia Flor  Address:  Phone MBCLRS:[phone]

## 2017-09-16 NOTE — PROGRESS NOTES
Paged Dr James Duke concerning the patients diet. Pt is currently NPO and was awaiting a GI consult. Dr Marcy Cota signed off because GI cannot do anything for the pts current condition. Paged Dr. James Duke for for diet order change.

## 2017-09-16 NOTE — PROGRESS NOTES
Pharmacy Verification of Non-formulary Medication    Patient insisted on using own home medication: Novolog Flexpen. Spoke with Ani Bhavik and informed her that patient insisted that he use his own Novolog flexpen from home. Novolog Flexpen is not on the formulary and is substituted to Humalog per P&T-approved protocol. Per Kathy Mendieta, pharmacy may verify and dietitian on duty will resolve issue as soon as able. Spoke with pharmacy managers to make an exception as patient does not have flexpen with a pharmacy ([facility]) label for authentication. In addition, patient stated that he is taking different mealtime doses from what I received from [facility] pharmacy. Last filled at [facility] on 2017. Per [facility] (Pharmacist: Lili Sierra):     Si units with breakfast and lunch and 25 units with dinner. Will verify order per sig received from [facility].     Please follow blood glucose levels and adjust as appropriate.      Thanks,  Alicia Mcclain, PHARMD

## 2017-09-16 NOTE — PROGRESS NOTES
Bedside and Verbal shift change report given to NOA (oncoming nurse) by Yasemin Arias (offgoing nurse). Report included the following information SBAR and MAR. Pt resting in bed and reports no needs at this time. Pt did request to use his own insulin pen located in pharmacy.

## 2017-09-16 NOTE — PROGRESS NOTES
Problem: Falls - Risk of  Goal: *Absence of Falls  Document Maryse Fall Risk and appropriate interventions in the flowsheet.    Outcome: Progressing Towards Goal  Fall Risk Interventions:              Medication Interventions: Patient to call before getting OOB

## 2017-09-16 NOTE — PROGRESS NOTES
1950 Patient received awake,alert and oriented. PRBC,s infusing well. No adverse reaction. Report received from [employer]. results,i and o  2115 Resting comfortably. PRbc,s completed,vss. Complaining of pain. 2140 Medicated for pain  2240 Good relief from analgesia. 0000 Sleeping  0100 Resting comfortably  0300 Sleeping  0500 sleeping  0630 resting comfortably  0753 Report given to AJ, including SBAR,kardex,mar,results.

## 2017-09-16 NOTE — PROGRESS NOTES
Pt was vomiting and complaining of extreme pain in his lower back. The amount was measured and pain medications were given IV.

## 2017-09-17 LAB
ANION GAP SERPL CALC-SCNC: 11 MMOL/L (ref 3–18)
BUN SERPL-MCNC: 14 MG/DL (ref 7–18)
BUN/CREAT SERPL: 13 (ref 12–20)
CALCIUM SERPL-MCNC: 8.6 MG/DL (ref 8.5–10.1)
CHLORIDE SERPL-SCNC: 107 MMOL/L (ref 100–108)
CO2 SERPL-SCNC: 22 MMOL/L (ref 21–32)
CREAT SERPL-MCNC: 1.1 MG/DL (ref 0.6–1.3)
ERYTHROCYTE [DISTWIDTH] IN BLOOD BY AUTOMATED COUNT: 16.7 % (ref 11.6–14.5)
GLUCOSE BLD STRIP.AUTO-MCNC: 112 MG/DL (ref 70–110)
GLUCOSE BLD STRIP.AUTO-MCNC: 118 MG/DL (ref 70–110)
GLUCOSE BLD STRIP.AUTO-MCNC: 180 MG/DL (ref 70–110)
GLUCOSE BLD STRIP.AUTO-MCNC: 212 MG/DL (ref 70–110)
GLUCOSE SERPL-MCNC: 126 MG/DL (ref 74–99)
HCT VFR BLD AUTO: 25.6 % (ref 36–48)
HGB BLD-MCNC: 8.3 G/DL (ref 13–16)
MCH RBC QN AUTO: 25.9 PG (ref 24–34)
MCHC RBC AUTO-ENTMCNC: 32.4 G/DL (ref 31–37)
MCV RBC AUTO: 80 FL (ref 74–97)
PLATELET # BLD AUTO: 230 K/UL (ref 135–420)
PMV BLD AUTO: 9.2 FL (ref 9.2–11.8)
POTASSIUM SERPL-SCNC: 3.7 MMOL/L (ref 3.5–5.5)
RBC # BLD AUTO: 3.2 M/UL (ref 4.7–5.5)
SODIUM SERPL-SCNC: 140 MMOL/L (ref 136–145)
WBC # BLD AUTO: 6.9 K/UL (ref 4.6–13.2)

## 2017-09-17 PROCEDURE — 36415 COLL VENOUS BLD VENIPUNCTURE: CPT | Performed by: INTERNAL MEDICINE

## 2017-09-17 PROCEDURE — 74011000258 HC RX REV CODE- 258: Performed by: INTERNAL MEDICINE

## 2017-09-17 PROCEDURE — 74011250637 HC RX REV CODE- 250/637: Performed by: INTERNAL MEDICINE

## 2017-09-17 PROCEDURE — 82962 GLUCOSE BLOOD TEST: CPT

## 2017-09-17 PROCEDURE — 74011250636 HC RX REV CODE- 250/636: Performed by: INTERNAL MEDICINE

## 2017-09-17 PROCEDURE — 65270000029 HC RM PRIVATE

## 2017-09-17 PROCEDURE — 77030020263 HC SOL INJ SOD CL0.9% LFCR 1000ML

## 2017-09-17 PROCEDURE — 85027 COMPLETE CBC AUTOMATED: CPT | Performed by: INTERNAL MEDICINE

## 2017-09-17 PROCEDURE — 80048 BASIC METABOLIC PNL TOTAL CA: CPT | Performed by: INTERNAL MEDICINE

## 2017-09-17 RX ORDER — SODIUM CHLORIDE 9 MG/ML
250 INJECTION, SOLUTION INTRAVENOUS AS NEEDED
Status: DISCONTINUED | OUTPATIENT
Start: 2017-09-17 | End: 2017-09-18

## 2017-09-17 RX ADMIN — MORPHINE SULFATE 5 MG: 10 INJECTION INTRAMUSCULAR; INTRAVENOUS; SUBCUTANEOUS at 13:49

## 2017-09-17 RX ADMIN — PANTOPRAZOLE SODIUM 40 MG: 40 TABLET, DELAYED RELEASE ORAL at 08:47

## 2017-09-17 RX ADMIN — CYCLOBENZAPRINE HYDROCHLORIDE 10 MG: 10 TABLET, FILM COATED ORAL at 09:51

## 2017-09-17 RX ADMIN — SODIUM CHLORIDE 125 ML/HR: 900 INJECTION, SOLUTION INTRAVENOUS at 20:44

## 2017-09-17 RX ADMIN — MORPHINE SULFATE 5 MG: 10 INJECTION INTRAMUSCULAR; INTRAVENOUS; SUBCUTANEOUS at 03:28

## 2017-09-17 RX ADMIN — ONDANSETRON 8 MG: 4 TABLET, ORALLY DISINTEGRATING ORAL at 08:46

## 2017-09-17 RX ADMIN — AMLODIPINE BESYLATE 5 MG: 5 TABLET ORAL at 09:51

## 2017-09-17 RX ADMIN — ATORVASTATIN CALCIUM 40 MG: 40 TABLET, FILM COATED ORAL at 09:51

## 2017-09-17 RX ADMIN — MORPHINE SULFATE 5 MG: 10 INJECTION INTRAMUSCULAR; INTRAVENOUS; SUBCUTANEOUS at 08:37

## 2017-09-17 RX ADMIN — GABAPENTIN 800 MG: 400 CAPSULE ORAL at 17:09

## 2017-09-17 RX ADMIN — CYCLOBENZAPRINE HYDROCHLORIDE 10 MG: 10 TABLET, FILM COATED ORAL at 17:09

## 2017-09-17 RX ADMIN — MORPHINE SULFATE 5 MG: 10 INJECTION INTRAMUSCULAR; INTRAVENOUS; SUBCUTANEOUS at 20:41

## 2017-09-17 RX ADMIN — PHYTONADIONE 10 MG: 10 INJECTION, EMULSION INTRAMUSCULAR; INTRAVENOUS; SUBCUTANEOUS at 09:10

## 2017-09-17 RX ADMIN — CYCLOBENZAPRINE HYDROCHLORIDE 10 MG: 10 TABLET, FILM COATED ORAL at 21:16

## 2017-09-17 RX ADMIN — INSULIN ASPART 22 UNITS: 100 INJECTION, SOLUTION INTRAVENOUS; SUBCUTANEOUS at 13:02

## 2017-09-17 RX ADMIN — PANTOPRAZOLE SODIUM 40 MG: 40 TABLET, DELAYED RELEASE ORAL at 17:09

## 2017-09-17 RX ADMIN — GABAPENTIN 800 MG: 400 CAPSULE ORAL at 09:51

## 2017-09-17 NOTE — PROGRESS NOTES
1938: Assumed patient care. Received report from [facility] (offgoing nurse). Report included SBAR, Kardex, and MAR. Patient in no signs of distress and no pain. Bed in lowest setting ,call light and possessions within reach. 0740: Bedside and Verbal shift change report given to Eh Everett RN (oncoming nurse) by Marlene Conklin RN (offgoing nurse). Report included the following information SBAR, Kardex and MAR.

## 2017-09-17 NOTE — PROGRESS NOTES
Patient received at 1600 from [facility], A&Ox4, in bed, awake. Pt does not appear to be in distress and denies any pain and/or discomfort. Safety measures take include bed in lowest locked position, call bell within reach, and personal items at bedside within reach. Pt verbalizes understanding of use of call bell and the need to call for assistance to ensure safety. 1600: Pt brought to 2101 1915: Bedside and Verbal shift change report given to Herb Ortiz RN (oncoming nurse) by Nurse Shauna Mittal (offgoing nurse). Report included the following information SBAR, Kardex, Intake/Output, MAR and Recent Results.

## 2017-09-17 NOTE — PROGRESS NOTES
Daily Progress Note: 9/17/2017 2:15 PM   Admit Date: 9/15/2017    Patient seen in follow up for multiple medical problems as listed below:  Patient Active Problem List   Diagnosis Code    Nausea with vomiting R11.2    Rash R21    Other and unspecified noninfectious gastroenteritis and colitis K52.9    Renal insufficiency N28.9    Hyperglycemia R73.9    DM (diabetes mellitus) ([facility] E11.9    Low magnesium levels R79.0    Anemia D64.9    GI bleeding K92.2    Diabetes ([facility] E11.9    Hypertension I10    Sleep apnea G47.30    Colon carcinoma ([facility] C18.9    [facility]eps[facility] A41.9    Hypokalemia E87.6    Lactic acidosis E87.2    Leukocytosis D72.829    MYRON (acute kidney injury) [facility] N17.9    Metastatic colon cancer to liver (HCC) C18.9, C78.7       Assesment     50 y.o. male with PMHX of Colon CA s/p hemicolectomy, DM, MANNY who presents from home after recent CT abd. He reports GI flu like illness for 2 weeks with poor appetite and worse abdominal swelling. In ER vitals normal. Hg 7.5, Cr 1.8, Guiac positive. GI consulted but had nothing further to offer. 1u PRBC ordered IVF started. Family deciding whether to go back to [place] or see an oncologist locally, they have decided to reach out to [facility]. Reveiwed the CT report with the patient that has unfortunate findings of abdominal lymphadenopathy, ascites, larger liver lesion, and portal vein thrombosis on 9/15. Planned CT guided liver biopsy and diagnostic paracentesis 9/18 with IR then patient will follow with [address] [place] [address]. Hg has fallen from 9.1 to 8.3 on 9/17 may need transfusion again prior to discharge. Oncology recommending full dose LMWH on discharge perhaps lovenox 1.5mgkg daily or arixtra could be used however must weigh the risk of GI bleed. Anemia - guiac positive. Acute on chronic 7.5 . Probable slow GI bleed from cancer. Transfuse 1u PRBC now 9.1 on 9/16 now 8.3  Liver lesion- probable metastatic colon CA - to liver. 10cm mass.  Has had chemo in past. Need biopsy   New ascites and significant abdominal lymphadenomapthy - unsure of utility of cytology on abdominal fluid. -VOA to consult recommend biopsy, can try for diagnostic paracentesis   PVT - likely due to large liver mass. GI consulted recommend no treatment, anticoagulation would be risky   MYRON- 1->1.8 -IVF no renal involvement seen on CT. IVF @ 150cc/hr. Cr to 1.4  DM-gluose 295. On odd home regimen of 30u novolog AC but not using levemir due to am lows. Follows with endo  MANNY -CPAP   Hx of Colon CA with L colectomy and 8 rounds of chemotherapy- dates unk. was thought to have resolved Colon Ca. Moderate malnutrition due to emil loss and caloric intake     Code status: Full-discussed 0% chance of survival.   PPX:  DVT: LMWH   GI: None indicated    DVT Protocol Active: yes  Code Status:  Full Code     Disposition: home Monday after IR. Needs     Subjective:     CC: Abdominal Pain and Other    Interval History: slight fall in Hg. Planned CT guided liver biopsy and possible diagnostic para tomorrow with IR. Discussed with on call IR.  NPO at midnight. Hold lovenox. Plan for FFP to start at midnight. Will also add Vit K x1    ROS: 11 point ROS negative except for anorexia and abd swelling, + back pain    Objective:     Visit Vitals    /71    Pulse (!) 111    Temp 98.4 °F (36.9 °C)    Resp 18    Ht 5' 9\" (1.753 m)    Wt 85 kg (187 lb 8 oz)    SpO2 90%    BMI 27.69 kg/m2       Temp (24hrs), Av.7 °F (37.1 °C), Min:98.4 °F (36.9 °C), Max:99 °F (37.2 °C)        Intake/Output Summary (Last 24 hours) at 17 0907  Last data filed at 17 0700   Gross per 24 hour   Intake             1500 ml   Output              130 ml   Net             1370 ml       Gen: AOx3, NAD  HEENT:  VAZQUEZ, EOMI. Neck: No Bruits/JVD   Lungs:   CTAB. Good respiratory effort  Heart:   RR S1 S2 without M/R/G  Abdomen: distended, global tenderness, BSX4,   Extremities:   trace LE edema. No cyanosis.   Skin:  no jaundice/lesions      Data Review:     Meds/Labs/Tests reviewed    Current Shift:     Last three shifts:  09/15 1901 - 09/17 0700  In: 3098.3 [I.V.:3098.3]  Out: 130   Recent Labs      09/17/17   0457  09/16/17   0453  09/15/17   1355   WBC  6.9  5.7  6.3   RBC  3.20*  3.52*  2.94*   HGB  8.3*  9.1*  7.5*   HCT  25.6*  28.0*  22.8*   PLT  230  256  266   GRANS   --    --   74*   LYMPH   --    --   13*   EOS   --    --   2       Recent Labs      09/17/17 0457 09/16/17 0453  09/15/17   1355   BUN  14  21*  28*   CREA  1.10  1.42*  1.83*   CA  8.6  7.6*  8.8   ALB   --    --   2.6*   K  3.7  4.2  4.0   NA  140  138  135*   CL  107  103  97*   CO2  22  22  24   GLU  126*  229*  295*        Lab Results   Component Value Date/Time    Glucose 126 09/17/2017 04:57 AM    Glucose 229 09/16/2017 04:53 AM    Glucose 295 09/15/2017 01:55 PM    Glucose 86 07/02/2017 07:46 AM    Glucose 81 07/01/2017 04:12 AM          Care coordination with Nursing/Consultants/staff: 15  Prior history, labs, and charting reviewed: 15    Procedures/Imaging:  OSH CT Abd 9/15    Total time spent with chart review, patient examination/education, discussion with staff on case,documentation and medication management / adjustment  :  40 Minutes      Dr Echevarria Carl [facility]  [phone]

## 2017-09-17 NOTE — PROGRESS NOTES
Problem: Falls - Risk of  Goal: *Absence of Falls  Document Maryse Fall Risk and appropriate interventions in the flowsheet.    Outcome: Progressing Towards Goal  Fall Risk Interventions:              Medication Interventions: Teach patient to arise slowly

## 2017-09-18 ENCOUNTER — APPOINTMENT (OUTPATIENT)
Dept: ULTRASOUND IMAGING | Age: 52
DRG: 435 | End: 2017-09-18
Attending: INTERNAL MEDICINE
Payer: COMMERCIAL

## 2017-09-18 LAB
ALBUMIN FLD-MCNC: 1.2 G/DL
ANION GAP SERPL CALC-SCNC: 13 MMOL/L (ref 3–18)
APPEARANCE FLD: ABNORMAL
BUN SERPL-MCNC: 14 MG/DL (ref 7–18)
BUN/CREAT SERPL: 12 (ref 12–20)
CALCIUM SERPL-MCNC: 7.9 MG/DL (ref 8.5–10.1)
CHLORIDE SERPL-SCNC: 106 MMOL/L (ref 100–108)
CO2 SERPL-SCNC: 21 MMOL/L (ref 21–32)
COLOR FLD: YELLOW
CREAT SERPL-MCNC: 1.15 MG/DL (ref 0.6–1.3)
EOSINOPHIL NFR FLD MANUAL: 0 %
ERYTHROCYTE [DISTWIDTH] IN BLOOD BY AUTOMATED COUNT: 17.1 % (ref 11.6–14.5)
GLUCOSE BLD STRIP.AUTO-MCNC: 224 MG/DL (ref 70–110)
GLUCOSE BLD STRIP.AUTO-MCNC: 266 MG/DL (ref 70–110)
GLUCOSE BLD STRIP.AUTO-MCNC: 298 MG/DL (ref 70–110)
GLUCOSE SERPL-MCNC: 265 MG/DL (ref 74–99)
HCT VFR BLD AUTO: 23.8 % (ref 36–48)
HGB BLD-MCNC: 7.7 G/DL (ref 13–16)
INR PPP: 1.4 (ref 0.8–1.2)
LDH FLD L TO P-CCNC: 331 U/L
LYMPHOCYTES NFR FLD: 66 %
MACROPHAGES NFR FLD: 19 %
MCH RBC QN AUTO: 26 PG (ref 24–34)
MCHC RBC AUTO-ENTMCNC: 32.4 G/DL (ref 31–37)
MCV RBC AUTO: 80.4 FL (ref 74–97)
MONOCYTES NFR FLD: 0 %
NEUTROPHILS NFR FLD: 15 %
NEUTS BAND # FLD: 0 %
NUC CELL # FLD: 1650 /CU MM
PLATELET # BLD AUTO: 213 K/UL (ref 135–420)
PMV BLD AUTO: 9.3 FL (ref 9.2–11.8)
POTASSIUM SERPL-SCNC: 4 MMOL/L (ref 3.5–5.5)
PROTHROMBIN TIME: 16.7 SEC (ref 11.5–15.2)
RBC # BLD AUTO: 2.96 M/UL (ref 4.7–5.5)
RBC # FLD: 1250 /CU MM
SODIUM SERPL-SCNC: 140 MMOL/L (ref 136–145)
SPECIMEN SOURCE FLD: ABNORMAL
SPECIMEN SOURCE FLD: NORMAL
SPECIMEN SOURCE FLD: NORMAL
WBC # BLD AUTO: 6.4 K/UL (ref 4.6–13.2)

## 2017-09-18 PROCEDURE — 65270000029 HC RM PRIVATE

## 2017-09-18 PROCEDURE — 30233K1 TRANSFUSION OF NONAUTOLOGOUS FROZEN PLASMA INTO PERIPHERAL VEIN, PERCUTANEOUS APPROACH: ICD-10-PCS | Performed by: INTERNAL MEDICINE

## 2017-09-18 PROCEDURE — 83615 LACTATE (LD) (LDH) ENZYME: CPT | Performed by: INTERNAL MEDICINE

## 2017-09-18 PROCEDURE — 0W9G3ZZ DRAINAGE OF PERITONEAL CAVITY, PERCUTANEOUS APPROACH: ICD-10-PCS | Performed by: RADIOLOGY

## 2017-09-18 PROCEDURE — 74011000250 HC RX REV CODE- 250: Performed by: RADIOLOGY

## 2017-09-18 PROCEDURE — 74011250636 HC RX REV CODE- 250/636: Performed by: RADIOLOGY

## 2017-09-18 PROCEDURE — 77030020263 HC SOL INJ SOD CL0.9% LFCR 1000ML

## 2017-09-18 PROCEDURE — P9059 PLASMA, FRZ BETWEEN 8-24HOUR: HCPCS | Performed by: INTERNAL MEDICINE

## 2017-09-18 PROCEDURE — 74011250636 HC RX REV CODE- 250/636: Performed by: INTERNAL MEDICINE

## 2017-09-18 PROCEDURE — 80048 BASIC METABOLIC PNL TOTAL CA: CPT | Performed by: INTERNAL MEDICINE

## 2017-09-18 PROCEDURE — 88334 PATH CONSLTJ SURG CYTO XM EA: CPT | Performed by: RADIOLOGY

## 2017-09-18 PROCEDURE — 85027 COMPLETE CBC AUTOMATED: CPT | Performed by: INTERNAL MEDICINE

## 2017-09-18 PROCEDURE — 85610 PROTHROMBIN TIME: CPT | Performed by: INTERNAL MEDICINE

## 2017-09-18 PROCEDURE — 82042 OTHER SOURCE ALBUMIN QUAN EA: CPT | Performed by: INTERNAL MEDICINE

## 2017-09-18 PROCEDURE — 88112 CYTOPATH CELL ENHANCE TECH: CPT | Performed by: INTERNAL MEDICINE

## 2017-09-18 PROCEDURE — 77010033678 HC OXYGEN DAILY

## 2017-09-18 PROCEDURE — 99152 MOD SED SAME PHYS/QHP 5/>YRS: CPT

## 2017-09-18 PROCEDURE — 82962 GLUCOSE BLOOD TEST: CPT

## 2017-09-18 PROCEDURE — 88307 TISSUE EXAM BY PATHOLOGIST: CPT | Performed by: RADIOLOGY

## 2017-09-18 PROCEDURE — 88333 PATH CONSLTJ SURG CYTO XM 1: CPT | Performed by: RADIOLOGY

## 2017-09-18 PROCEDURE — 36430 TRANSFUSION BLD/BLD COMPNT: CPT

## 2017-09-18 PROCEDURE — 76942 ECHO GUIDE FOR BIOPSY: CPT

## 2017-09-18 PROCEDURE — C1729 CATH, DRAINAGE: HCPCS

## 2017-09-18 PROCEDURE — 77030020256 HC SOL INJ NACL 0.9%  500ML

## 2017-09-18 PROCEDURE — 89051 BODY FLUID CELL COUNT: CPT | Performed by: INTERNAL MEDICINE

## 2017-09-18 PROCEDURE — 36415 COLL VENOUS BLD VENIPUNCTURE: CPT | Performed by: INTERNAL MEDICINE

## 2017-09-18 PROCEDURE — 88305 TISSUE EXAM BY PATHOLOGIST: CPT | Performed by: INTERNAL MEDICINE

## 2017-09-18 PROCEDURE — 0FB13ZX EXCISION OF RIGHT LOBE LIVER, PERCUTANEOUS APPROACH, DIAGNOSTIC: ICD-10-PCS | Performed by: RADIOLOGY

## 2017-09-18 PROCEDURE — 74011250637 HC RX REV CODE- 250/637: Performed by: INTERNAL MEDICINE

## 2017-09-18 RX ORDER — LIDOCAINE HYDROCHLORIDE 10 MG/ML
1-5 INJECTION INFILTRATION; PERINEURAL
Status: COMPLETED | OUTPATIENT
Start: 2017-09-18 | End: 2017-09-18

## 2017-09-18 RX ORDER — MIDAZOLAM HYDROCHLORIDE 1 MG/ML
.5-2 INJECTION, SOLUTION INTRAMUSCULAR; INTRAVENOUS
Status: DISCONTINUED | OUTPATIENT
Start: 2017-09-18 | End: 2017-09-21 | Stop reason: HOSPADM

## 2017-09-18 RX ORDER — LIDOCAINE HYDROCHLORIDE 10 MG/ML
9 INJECTION INFILTRATION; PERINEURAL
Status: COMPLETED | OUTPATIENT
Start: 2017-09-18 | End: 2017-09-18

## 2017-09-18 RX ORDER — FENTANYL CITRATE 50 UG/ML
25-100 INJECTION, SOLUTION INTRAMUSCULAR; INTRAVENOUS
Status: DISCONTINUED | OUTPATIENT
Start: 2017-09-18 | End: 2017-09-21 | Stop reason: HOSPADM

## 2017-09-18 RX ADMIN — FENTANYL CITRATE 50 MCG: 50 INJECTION, SOLUTION INTRAMUSCULAR; INTRAVENOUS at 13:08

## 2017-09-18 RX ADMIN — CYCLOBENZAPRINE HYDROCHLORIDE 10 MG: 10 TABLET, FILM COATED ORAL at 21:26

## 2017-09-18 RX ADMIN — MORPHINE SULFATE 5 MG: 10 INJECTION INTRAMUSCULAR; INTRAVENOUS; SUBCUTANEOUS at 20:23

## 2017-09-18 RX ADMIN — SODIUM BICARBONATE 1 ML: 0.2 INJECTION, SOLUTION INTRAVENOUS at 12:57

## 2017-09-18 RX ADMIN — PANTOPRAZOLE SODIUM 40 MG: 40 TABLET, DELAYED RELEASE ORAL at 16:36

## 2017-09-18 RX ADMIN — FENTANYL CITRATE 50 MCG: 50 INJECTION, SOLUTION INTRAMUSCULAR; INTRAVENOUS at 12:18

## 2017-09-18 RX ADMIN — CYCLOBENZAPRINE HYDROCHLORIDE 10 MG: 10 TABLET, FILM COATED ORAL at 08:05

## 2017-09-18 RX ADMIN — FENTANYL CITRATE 50 MCG: 50 INJECTION, SOLUTION INTRAMUSCULAR; INTRAVENOUS at 12:47

## 2017-09-18 RX ADMIN — MIDAZOLAM HYDROCHLORIDE 1 MG: 1 INJECTION, SOLUTION INTRAMUSCULAR; INTRAVENOUS at 12:49

## 2017-09-18 RX ADMIN — LIDOCAINE HYDROCHLORIDE 5 ML: 10 INJECTION, SOLUTION INFILTRATION; PERINEURAL at 13:19

## 2017-09-18 RX ADMIN — GABAPENTIN 800 MG: 400 CAPSULE ORAL at 08:05

## 2017-09-18 RX ADMIN — MORPHINE SULFATE 5 MG: 10 INJECTION INTRAMUSCULAR; INTRAVENOUS; SUBCUTANEOUS at 16:36

## 2017-09-18 RX ADMIN — SODIUM BICARBONATE 1 ML: 0.2 INJECTION, SOLUTION INTRAVENOUS at 12:08

## 2017-09-18 RX ADMIN — AMLODIPINE BESYLATE 5 MG: 5 TABLET ORAL at 08:05

## 2017-09-18 RX ADMIN — CYCLOBENZAPRINE HYDROCHLORIDE 10 MG: 10 TABLET, FILM COATED ORAL at 16:36

## 2017-09-18 RX ADMIN — MORPHINE SULFATE 5 MG: 10 INJECTION INTRAMUSCULAR; INTRAVENOUS; SUBCUTANEOUS at 04:14

## 2017-09-18 RX ADMIN — PANTOPRAZOLE SODIUM 40 MG: 40 TABLET, DELAYED RELEASE ORAL at 08:05

## 2017-09-18 RX ADMIN — MIDAZOLAM HYDROCHLORIDE 1 MG: 1 INJECTION, SOLUTION INTRAMUSCULAR; INTRAVENOUS at 12:58

## 2017-09-18 RX ADMIN — SODIUM CHLORIDE 125 ML/HR: 900 INJECTION, SOLUTION INTRAVENOUS at 20:23

## 2017-09-18 RX ADMIN — MORPHINE SULFATE 5 MG: 10 INJECTION INTRAMUSCULAR; INTRAVENOUS; SUBCUTANEOUS at 08:46

## 2017-09-18 RX ADMIN — LIDOCAINE HYDROCHLORIDE 9 ML: 10 INJECTION, SOLUTION INFILTRATION; PERINEURAL at 12:57

## 2017-09-18 RX ADMIN — FENTANYL CITRATE 50 MCG: 50 INJECTION, SOLUTION INTRAMUSCULAR; INTRAVENOUS at 13:02

## 2017-09-18 RX ADMIN — GABAPENTIN 800 MG: 400 CAPSULE ORAL at 17:29

## 2017-09-18 RX ADMIN — ATORVASTATIN CALCIUM 40 MG: 40 TABLET, FILM COATED ORAL at 08:05

## 2017-09-18 NOTE — DIABETES MGMT
Diabetes Patient/Family Education Record    Factors That  May Influence Patients Ability  to Learn or  Comply With  Recommendations:    []   Language barrier    []   Cultural needs   []   Motivation    []   Cognitive limitation    []   Physical   []   Education    []   Physiological factors   []   Hearing/vision/speaking impairment   []   Caodaism beliefs    []   Financial factors   [x]  Other: terminal illness   []  No factors identified at this time.      Person Instructed:   [x]   Patient   [x]   Family (wife/daughter)   []  Other     Preference for Learning:   [x]   Verbal   []   Written   []  Demonstration     Level of Comprehension & Competence:    [x]  Good                                      [] Fair                                     []  Poor                             []  Needs Reinforcement   [x]  Teachback completed    Education Component:   [x]  Medication management, including how to administer insulin (if appropriate) and potential medication interactions    []  Nutritional management including the role of carbohydrate intake   []  Exercise   []  Signs, symptoms, and treatment of hyperglycemia and hypoglycemia   [] Treatment of hyperglycemia and hypoglycemia   [x]  Importance of blood glucose monitoring and how to obtain a blood glucose meter patient's wife stating that patient checks  BG at home 4 times daily   []  Instruction on use of blood glucose meter   []  Discuss the importance of HbA1C monitoring and provide patient with  results   []  Sick day guidelines   []  Proper use and disposal of lancets, needles, syringes or insulin pens (if appropriate)   []  Potential long-term complications (retinopathy, kidney disease, neuropathy, heart disease, stroke, vascular disease, foot care)   [] Provide emergency contact number and contact number for more information    [x]  Goal:  Patient/family will demonstrate understanding of Diabetes Self Management Skills by: (date) __9/25/17_____  Plan for post-discharge education or self-management support:    [] Outpatient class schedule provided            [x] Patient Declined    [] Scheduled for outpatient classes (date) _______

## 2017-09-18 NOTE — PROGRESS NOTES
Waiting for US paracentesis to complete draining now.    Pt family aware  Notified pathology that they will be needed for liver mass biopsy

## 2017-09-18 NOTE — PROGRESS NOTES
TRANSFER - OUT REPORT:    Verbal report given to Anthony Hebert RN(name) on Fiona Butts  being transferred to [facility](unit) for routine progression of care       Report consisted of patients Situation, Background, Assessment and   Recommendations(SBAR). Information from the following report(s) SBAR and Procedure Summary was reviewed with the receiving nurse. Lines:   Peripheral IV 09/15/17 Left Hand (Active)   Site Assessment Clean, dry, & intact 9/17/2017  7:15 PM   Phlebitis Assessment 0 9/17/2017  7:15 PM   Infiltration Assessment 0 9/17/2017  7:15 PM   Dressing Status Clean, dry, & intact 9/17/2017  7:15 PM   Dressing Type Tape;Transparent 9/17/2017  7:15 PM   Hub Color/Line Status Pink;Capped 9/17/2017  7:15 PM   Action Taken Open ports on tubing capped 9/17/2017  5:34 PM   Alcohol Cap Used Yes 9/17/2017  7:15 PM       Peripheral IV 09/15/17 Right Antecubital (Active)   Site Assessment Clean, dry, & intact 9/17/2017  7:15 PM   Phlebitis Assessment 0 9/17/2017  7:15 PM   Infiltration Assessment 0 9/17/2017  7:15 PM   Dressing Status Clean, dry, & intact 9/17/2017  7:15 PM   Dressing Type Tape;Transparent 9/17/2017  7:15 PM   Hub Color/Line Status Green;Capped 9/17/2017  7:15 PM   Action Taken Open ports on tubing capped 9/17/2017  5:34 PM   Alcohol Cap Used Yes 9/17/2017  7:15 PM        Opportunity for questions and clarification was provided.       Patient transported with:   O2 @ 2 liters  Tech

## 2017-09-18 NOTE — DIABETES MGMT
NUTRITIONAL ASSESSMENT GLYCEMIC CONTROL/ PLAN OF CARE     Javier Chambers           50 y.o.           9/15/2017                 1. Portal vein thrombosis    2. Low hemoglobin    3. Lower GI bleed    4. Elevated INR         INTERVENTIONS/PLAN:   1. Should blood glucose remain elevated, would suggest starting with 4 units Levemir q 12 hours. 2.  Will monitor feeding status, labs and weights. ASSESSMENT:   Nutritional Status:  Pt is 117% ideal wt; BMI (calculated): 27.7 kg/m2 but difficult to assess weight status with fluid fluctuations from ascites. Diet history reveals pt with poor po intake PTA. Nutrition Diagnoses:   Inadequate oral food and beverage intake due to N/V/GI protal vein thrombosis and liver mass as evidenced by pt admitted with N/V and now with NPO orders. Altered nutrition related labs due to diabetes as evidenced by BG of 266, 224      Diabetes Management:   Pt known from previous admission in 2016 and 2017 when he was taking Levemir but no longer takes it apparently due to hypoglycemia. Pt now with poor po intake and is currently  NPO. Pt hs not taken any scheduled meal time insulin today with NPO status. Recent blood glucose:    9/18/17:  266, 224 - pt has been NPO and therefore not receiving scheduled meal time Humalog). 9/17/17:  112,  212, 118, 189 - pt self administered 22 units lispro  Within target range (non-ICU: <140; ICU<180): [] Yes   [x]  No    Current Insulin regimen:   Humalog pen (pt brought from home and with orders for pt to self administrate) 22 units lispro 2 times daily with meals (appears to be at breakfast and lunch) and 25 units at dinner   Home medication/insulin regimen:   30 units Humalog AC (pens)  HbA1c:  9.0% - ave BG has been ~ 210 mg/dL over past 3 months.   Adequate glycemic control PTA:  [] Yes  [x] No       SUBJECTIVE/OBJECTIVE:   Information obtained from: chart review, pt, pt's wife  Pt reports he was unable to eat for several weeks PTA due to \"all of this fluid\". Pt denies having food allergies or problem with chewing or swallowing. Pt with PMHX of Colon CA s/p hemicolectomy and chemo, T2DM, MANNY who presented  To ED after recent CT abd, liver mass, portal vein thrombosis and is s/p U/S guided paracentesis and U/S liver biopsy 9/18/17. Pt reported GI flu like illness for 2 weeks with poor appetite and worse abdominal swelling. States he has tried chocolate Ensure and would be willing to try it but in different flavors.      Diet: NPO    Patient Vitals for the past 100 hrs:   % Diet Eaten   09/17/17 1302 80 %   09/17/17 0955 75 %         Medications: [x]                Reviewed   Pertinent:  Lipitor, FeSO4  IVF:  NS at 125 ml/hr    Most Recent POC Glucose: Recent Labs      09/18/17   0454  09/17/17   0457  09/16/17   0453   GLU  265*  126*  229*         Labs:   Lab Results   Component Value Date/Time    Hemoglobin A1c 9.0 09/15/2017 01:55 PM     Lab Results   Component Value Date/Time    Hemoglobin A1c 9.0 09/15/2017 01:55 PM    Hemoglobin A1c 7.6 08/08/2016 01:10 PM    Hemoglobin A1c 9.3 08/16/2013 09:12 AM     Lab Results   Component Value Date/Time    Sodium 140 09/18/2017 04:54 AM    Potassium 4.0 09/18/2017 04:54 AM    Chloride 106 09/18/2017 04:54 AM    CO2 21 09/18/2017 04:54 AM    Anion gap 13 09/18/2017 04:54 AM    Glucose 265 09/18/2017 04:54 AM    BUN 14 09/18/2017 04:54 AM    Creatinine 1.15 09/18/2017 04:54 AM    Calcium 7.9 09/18/2017 04:54 AM    Magnesium 1.9 07/02/2017 07:46 AM    Phosphorus 2.4 06/26/2017 03:11 PM    Albumin 2.6 09/15/2017 01:55 PM       Anthropometrics: IBW : 72.6 kg (160 lb), % IBW (Calculated): 117.19 %, BMI (calculated): 27.7  Wt Readings from Last 1 Encounters:   09/17/17 85 kg (187 lb 8 oz)    Ht Readings from Last 1 Encounters:   09/17/17 5' 9\" (1.753 m)     8/10/16 weight - 88.0 kg  6/26/17 weight - 89.9 kg    Estimated Nutrition Needs:  1864 Kcals/day, Protein (g): 128 g Fluid (ml): 2600 ml  Based on:   [x]          Actual BW    []          ABW   []            Adjusted BW        Nutrition Diagnoses:   See above         Nutrition Interventions:  None at this time - NPO  Goal:   Blood glucose will be within target range of  mg/dL by 9/21/17. Pt will consume >75% meals by 9/23/17.         Nutrition Monitoring and Evaluation      [x]     Monitor po intake on meal rounds when diet advanced  [x]     Continue inpatient monitoring and intervention  []     Other:      Nutrition Risk:  [x]   High     []  Moderate    []  Minimal/Uncompromised    Sandy Ventura, [address], [facility]   Office:  [address] [place] [address] Pager:  [phone]

## 2017-09-18 NOTE — ROUTINE PROCESS
Bedside and Verbal shift change report given to Nancy Arellano RN (oncoming nurse) by Ana María Wilcox RN   (offgoing nurse). Report included the following information SBAR, Intake/Output, MAR, Recent Results and Med Rec Status.

## 2017-09-18 NOTE — DIABETES MGMT
GLYCEMIC CONTROL:  Pt now receiving mechanical soft diet with Ensure TID. Pt has consumed ~ 150 calories at dinner but is still eating. Discussed home diabetes management where pt takes Novolog 30 units TID with meals and additional corrective Novolog 1 unit for every 20 mg/dL > 120 mg/dL. Pt then stating that he sometimes takes only the corrective Novolog due to recent poor po intake. He states he was once taking Levemir, as much as 40 units BID but stopped taking it due to \"dangerously low blood sugars\". Pt appears to have inconsistent pattern of taking his Novolog. He wishes to use his Novolog pens from home and is determining his dose based on the above dosing information. Discussed hospital protocol for Levemir and how it works as a basal insulin. Pt appears to have better understanding of the role of basal insulin. For now, pt will use his Novolog from home with doses based on his home dosing \"method\" and RN to administer. D/w RN. Will order POC glucose testing ACHS. Should blood glucose continue to be elevated, suggest adding 4 units Levemir q 12 hours. Pt appears to understand. Will add strawberry Ensure TID.     Dillard Mcardle, RD, CDE   Office:  [facility] Pager:  [phone]

## 2017-09-18 NOTE — ROUTINE PROCESS
0115 Pt in bed 1 unit FFP started as ordered  0200 No distress noted  0300 No distress noted  0400 FFP completed, no distress noted  0415 Pt in bed medicated for back with morphine as ordered  0500 Pt sleeping no distress noted

## 2017-09-18 NOTE — PROGRESS NOTES
Progress Note      Patient: Sharita Corrales               Sex: male          DOA: 9/15/2017       YOB: 1965      Age:  50 y.o.        LOS:  LOS: 3 days               Subjective:   Pt seen today and he is to b sent down to radiology for a biopsy of his liver mass and he will also have a paracentesis for comfort measures . He has a poor prognosis . He is somewhat unsure if he will go up to [place] for more chemotherapy. .his blood glucose is elevated today . Objective:      Visit Vitals    /69 (BP 1 Location: Left arm, BP Patient Position: At rest)    Pulse (!) 106    Temp 98.1 °F (36.7 °C)    Resp 16    Ht 5' 9\" (1.753 m)    Wt 85 kg (187 lb 8 oz)    SpO2 92%    BMI 27.69 kg/m2       Physical Exam:  Pt is feeling no pain .  He will go to radiology today for a biopsy   heart reg rate and rhythm   Lungs  Good air flow   Abdomen distended due to hepatomegaly and ascites    Extremities  milf peripheral edema   Neuro intact       Lab/Data Reviewed:  CMP:   Lab Results   Component Value Date/Time     09/18/2017 04:54 AM    K 4.0 09/18/2017 04:54 AM     09/18/2017 04:54 AM    CO2 21 09/18/2017 04:54 AM    AGAP 13 09/18/2017 04:54 AM     (H) 09/18/2017 04:54 AM    BUN 14 09/18/2017 04:54 AM    CREA 1.15 09/18/2017 04:54 AM    GFRAA >60 09/18/2017 04:54 AM    GFRNA >60 09/18/2017 04:54 AM    CA 7.9 (L) 09/18/2017 04:54 AM     CBC:   Lab Results   Component Value Date/Time    WBC 6.4 09/18/2017 04:54 AM    HGB 7.7 (L) 09/18/2017 04:54 AM    HCT 23.8 (L) 09/18/2017 04:54 AM     09/18/2017 04:54 AM           Assessment/Plan     Principal Problem:    Metastatic colon cancer to liver ([facility] (9/15/2017)the patient has a 10 cm liver mass     Active Problems:    DM (diabetes mellitus) ([facility] (8/17/2013)      Diabetes ([address] (8/8/2016)      Hypertension (8/8/2016)      MYRON (acute kidney injury) ([facility] (6/25/2017)        Plan:pt will have a biopsy of his large liver mass and will also have a paracentesis   Will discuss the possibility of further chemotherapy with the patient .

## 2017-09-18 NOTE — PROGRESS NOTES
Problem: Falls - Risk of  Goal: *Absence of Falls  Document Maryse Fall Risk and appropriate interventions in the flowsheet.    Outcome: Progressing Towards Goal  Fall Risk Interventions:              Medication Interventions: Teach patient to arise slowly, Patient to call before getting OOB

## 2017-09-18 NOTE — PROCEDURES
Vascular & Interventional Radiology Brief Procedure Note    Interventional Radiologist: Pernell Olguin MD    Pre-operative Diagnosis:  Metastatic colon ca, ascites, liver mass    Post-operative Diagnosis: Same as pre-op dx    Procedure(s) Performed:  U/S guided paracentesis, U/S guided liver biopsy    Anesthesia:  Local and Moderate Sedation    Findings:  4700cc slightly cloudy yellow fluid, sent to lab as requested. 17/18g biopsy specimens x 3 obtained of right hepatic lobe mass, at least 8 cm. Adequate specimen per pathologist on site.     Complications: None    Estimated Blood Loss:  minimal    Tubes and Drains: None    Specimens: as above    Condition: stable          [place]  [address] [place] [address],[facility]  9/18/2017

## 2017-09-18 NOTE — PROGRESS NOTES
0715: bedside report given by Jared Lopez RN. Patient is sleeping at this time, no signs of distress. Will return for full assessment. 1122: patient off unit to ultrasound with transport and wife. No signs of distress upon leaving unit. 1:49 patient received back to unit. Drowsy, but orientated. Nasal canula at 2L. Right side dressings are clean, dry, and intact. Will continue to monitor. Bedside and Verbal shift change report given to Kvng Ortez RN (oncoming nurse) by Erasmo Yeboah RN (offgoing nurse). Report included the following information SBAR, Procedure Summary, MAR, Recent Results and Med Rec Status.

## 2017-09-19 ENCOUNTER — APPOINTMENT (OUTPATIENT)
Dept: CT IMAGING | Age: 52
DRG: 435 | End: 2017-09-19
Attending: HOSPITALIST
Payer: COMMERCIAL

## 2017-09-19 LAB
ALBUMIN SERPL-MCNC: 2 G/DL (ref 3.4–5)
ALBUMIN/GLOB SERPL: 0.5 {RATIO} (ref 0.8–1.7)
ALP SERPL-CCNC: 376 U/L (ref 45–117)
ALT SERPL-CCNC: 12 U/L (ref 16–61)
ANION GAP SERPL CALC-SCNC: 11 MMOL/L (ref 3–18)
AST SERPL-CCNC: 51 U/L (ref 15–37)
BASOPHILS # BLD: 0 K/UL (ref 0–0.06)
BASOPHILS NFR BLD: 0 % (ref 0–2)
BILIRUB SERPL-MCNC: 0.6 MG/DL (ref 0.2–1)
BLD PROD TYP BPU: NORMAL
BPU ID: NORMAL
BUN SERPL-MCNC: 11 MG/DL (ref 7–18)
BUN/CREAT SERPL: 11 (ref 12–20)
CALCIUM SERPL-MCNC: 7.6 MG/DL (ref 8.5–10.1)
CALLED TO:,BCALL1: NORMAL
CHLORIDE SERPL-SCNC: 105 MMOL/L (ref 100–108)
CO2 SERPL-SCNC: 23 MMOL/L (ref 21–32)
CREAT SERPL-MCNC: 1.01 MG/DL (ref 0.6–1.3)
DIFFERENTIAL METHOD BLD: ABNORMAL
EOSINOPHIL # BLD: 0.2 K/UL (ref 0–0.4)
EOSINOPHIL NFR BLD: 3 % (ref 0–5)
ERYTHROCYTE [DISTWIDTH] IN BLOOD BY AUTOMATED COUNT: 17.2 % (ref 11.6–14.5)
GLOBULIN SER CALC-MCNC: 4.3 G/DL (ref 2–4)
GLUCOSE BLD STRIP.AUTO-MCNC: 155 MG/DL (ref 70–110)
GLUCOSE BLD STRIP.AUTO-MCNC: 196 MG/DL (ref 70–110)
GLUCOSE BLD STRIP.AUTO-MCNC: 275 MG/DL (ref 70–110)
GLUCOSE BLD STRIP.AUTO-MCNC: 93 MG/DL (ref 70–110)
GLUCOSE SERPL-MCNC: 337 MG/DL (ref 74–99)
HCT VFR BLD AUTO: 25.6 % (ref 36–48)
HGB BLD-MCNC: 8.2 G/DL (ref 13–16)
INR PPP: 1.4 (ref 0.8–1.2)
LYMPHOCYTES # BLD: 1 K/UL (ref 0.9–3.6)
LYMPHOCYTES NFR BLD: 16 % (ref 21–52)
MCH RBC QN AUTO: 25.8 PG (ref 24–34)
MCHC RBC AUTO-ENTMCNC: 32 G/DL (ref 31–37)
MCV RBC AUTO: 80.5 FL (ref 74–97)
MONOCYTES # BLD: 0.6 K/UL (ref 0.05–1.2)
MONOCYTES NFR BLD: 9 % (ref 3–10)
NEUTS SEG # BLD: 4.4 K/UL (ref 1.8–8)
NEUTS SEG NFR BLD: 72 % (ref 40–73)
PLATELET # BLD AUTO: 212 K/UL (ref 135–420)
PMV BLD AUTO: 9.3 FL (ref 9.2–11.8)
POTASSIUM SERPL-SCNC: 3.8 MMOL/L (ref 3.5–5.5)
PROT SERPL-MCNC: 6.3 G/DL (ref 6.4–8.2)
PROTHROMBIN TIME: 16.9 SEC (ref 11.5–15.2)
RBC # BLD AUTO: 3.18 M/UL (ref 4.7–5.5)
SODIUM SERPL-SCNC: 139 MMOL/L (ref 136–145)
STATUS OF UNIT,%ST: NORMAL
UNIT DIVISION, %UDIV: 0
WBC # BLD AUTO: 6.1 K/UL (ref 4.6–13.2)

## 2017-09-19 PROCEDURE — 85025 COMPLETE CBC W/AUTO DIFF WBC: CPT | Performed by: INTERNAL MEDICINE

## 2017-09-19 PROCEDURE — 80053 COMPREHEN METABOLIC PANEL: CPT | Performed by: INTERNAL MEDICINE

## 2017-09-19 PROCEDURE — 77030020263 HC SOL INJ SOD CL0.9% LFCR 1000ML

## 2017-09-19 PROCEDURE — 65270000029 HC RM PRIVATE

## 2017-09-19 PROCEDURE — 74011250636 HC RX REV CODE- 250/636: Performed by: INTERNAL MEDICINE

## 2017-09-19 PROCEDURE — 82962 GLUCOSE BLOOD TEST: CPT

## 2017-09-19 PROCEDURE — 74177 CT ABD & PELVIS W/CONTRAST: CPT

## 2017-09-19 PROCEDURE — 74011636320 HC RX REV CODE- 636/320: Performed by: HOSPITALIST

## 2017-09-19 PROCEDURE — 85610 PROTHROMBIN TIME: CPT | Performed by: INTERNAL MEDICINE

## 2017-09-19 PROCEDURE — 74011250637 HC RX REV CODE- 250/637: Performed by: INTERNAL MEDICINE

## 2017-09-19 PROCEDURE — 36415 COLL VENOUS BLD VENIPUNCTURE: CPT | Performed by: INTERNAL MEDICINE

## 2017-09-19 RX ADMIN — MORPHINE SULFATE 5 MG: 10 INJECTION INTRAMUSCULAR; INTRAVENOUS; SUBCUTANEOUS at 00:31

## 2017-09-19 RX ADMIN — GABAPENTIN 800 MG: 400 CAPSULE ORAL at 08:50

## 2017-09-19 RX ADMIN — CYCLOBENZAPRINE HYDROCHLORIDE 10 MG: 10 TABLET, FILM COATED ORAL at 08:50

## 2017-09-19 RX ADMIN — GABAPENTIN 800 MG: 400 CAPSULE ORAL at 18:00

## 2017-09-19 RX ADMIN — AMLODIPINE BESYLATE 5 MG: 5 TABLET ORAL at 08:50

## 2017-09-19 RX ADMIN — IOPAMIDOL 96 ML: 612 INJECTION, SOLUTION INTRAVENOUS at 19:20

## 2017-09-19 RX ADMIN — MORPHINE SULFATE 5 MG: 10 INJECTION INTRAMUSCULAR; INTRAVENOUS; SUBCUTANEOUS at 20:04

## 2017-09-19 RX ADMIN — SODIUM CHLORIDE 125 ML/HR: 900 INJECTION, SOLUTION INTRAVENOUS at 05:02

## 2017-09-19 RX ADMIN — ONDANSETRON 8 MG: 4 TABLET, ORALLY DISINTEGRATING ORAL at 17:36

## 2017-09-19 RX ADMIN — MORPHINE SULFATE 5 MG: 10 INJECTION INTRAMUSCULAR; INTRAVENOUS; SUBCUTANEOUS at 15:22

## 2017-09-19 RX ADMIN — PANTOPRAZOLE SODIUM 40 MG: 40 TABLET, DELAYED RELEASE ORAL at 16:19

## 2017-09-19 RX ADMIN — SODIUM CHLORIDE 125 ML/HR: 900 INJECTION, SOLUTION INTRAVENOUS at 23:14

## 2017-09-19 RX ADMIN — CYCLOBENZAPRINE HYDROCHLORIDE 10 MG: 10 TABLET, FILM COATED ORAL at 16:19

## 2017-09-19 RX ADMIN — MORPHINE SULFATE 5 MG: 10 INJECTION INTRAMUSCULAR; INTRAVENOUS; SUBCUTANEOUS at 05:02

## 2017-09-19 RX ADMIN — SODIUM CHLORIDE 125 ML/HR: 900 INJECTION, SOLUTION INTRAVENOUS at 13:54

## 2017-09-19 RX ADMIN — INSULIN ASPART 22 UNITS: 100 INJECTION, SOLUTION INTRAVENOUS; SUBCUTANEOUS at 08:53

## 2017-09-19 RX ADMIN — INSULIN ASPART 25 UNITS: 100 INJECTION, SOLUTION INTRAVENOUS; SUBCUTANEOUS at 18:01

## 2017-09-19 RX ADMIN — MORPHINE SULFATE 5 MG: 10 INJECTION INTRAMUSCULAR; INTRAVENOUS; SUBCUTANEOUS at 09:16

## 2017-09-19 RX ADMIN — PANTOPRAZOLE SODIUM 40 MG: 40 TABLET, DELAYED RELEASE ORAL at 08:50

## 2017-09-19 RX ADMIN — CYCLOBENZAPRINE HYDROCHLORIDE 10 MG: 10 TABLET, FILM COATED ORAL at 22:12

## 2017-09-19 RX ADMIN — ATORVASTATIN CALCIUM 40 MG: 40 TABLET, FILM COATED ORAL at 08:50

## 2017-09-19 NOTE — PROGRESS NOTES
Problem: Falls - Risk of  Goal: *Absence of Falls  Document Maryse Fall Risk and appropriate interventions in the flowsheet.    Outcome: Progressing Towards Goal  Fall Risk Interventions:              Medication Interventions: Patient to call before getting OOB           History of Falls Interventions: Door open when patient unattended

## 2017-09-19 NOTE — PROGRESS NOTES
Patient received in bed awake. Patient A&Ox4; c/o Abd pain 6/10; see MAR for previous action. No distress noted. Frequently use items within reach. Bed locked in low position. Call bell within reach and Patient verbalized understanding of use for assistance and needs. 9645- Patient has own medication insulin pen and to [facility] has Novolog 22 units sq scheduled for breakfast said that he will be giving himself an additional 8 units for s/s equal Novolog 30 units sq. Patient made aware of Novolog 22 units scheduled for breakfast, Novolog 22 units scheduled for lunch and Novolog 25 scheduled for dinner. Patient continue to say that he would be self injecting 30 units. Dr. Karlos Paniagua to [facility] station made aware. 1150- Patient lying in bed with cell phone in hand. Patient was asked how was his pain level and if the prn Morphine was beneficial said \"I'm okay, it's about a four or five right now, as long as I lay still\", \"I don't like taking a lot of pain medicine. \"     [phone]- Dr. Karlos Paniagua was called and made aware that the Patho results are posted; voiced understanding said \"the biopsy, thank you very much. \"    [phone]- Patient made aware of  and refused his Novolog 22 units scheduled for lunch said \"I don't want to take when it's that low, it in danger of chasing my sugar. \"    3332- Patient c/o back pain 8/10. VS taken and . See MAR for prn Morphine to be given. Call bell w/in reach. 1616- Patient awake with family at bedside. Back pain level decreased to 5/10. Call bell w/in reach. 1730-  Patient sitting up at edge of bed, c/o nausea; see MAR for prn Zofran to be given. Visitors at bedside. Call bell w/in reach. 1800- F/u on prn Zofran given. Patient remains sitting up at edge of bed, said that nausea continues \"slightly. \" Call bell w/in reach.

## 2017-09-19 NOTE — ROUTINE PROCESS
Bedside and Verbal shift change report given to Ines, RN and [facility], RN (oncoming nurse) by Richard Mary RN, BSN (offgoing nurse). Report given with SBAR, Kardex, Intake/Output, MAR and Recent Results.

## 2017-09-19 NOTE — MED STUDENT NOTES
Progress Note    Patient: Josep Mota MRN: [id number]  SSN: [id number]    YOB: 1965  Age: 50 y.o. Sex: male      Admit Date: 9/15/2017    LOS: 4 days     Subjective:     Patient received paracentesis and liver biopsy yesterday, which was positive for metastatic adenocarcinoma. Patient had previously been receiving chemotherapy in [place]. Discussed options for future chemotherapy. Patient currently experiencing abdominal pain 5/10, laying still. Uses morphine PRN.      Objective:     Vitals:    09/18/17 1406 09/18/17 1420 09/19/17 0503 09/19/17 0846   BP: 121/59 115/59 115/63 145/85   Pulse: (!) 107  100 (!) 105   Resp: 17 16 20 18   Temp:   98.4 °F (36.9 °C) 98.9 °F (37.2 °C)   SpO2: 95% 93% 95% 92%   Weight:   81.2 kg (179 lb)    Height:            Intake and Output:  Current Shift:    Last three shifts: 09/17 1901 - 09/19 0700  In: 4441.8 [P.O.:180; I.V.:3908.3]  Out: 600 [Urine:600]    Physical Exam:   GENERAL: alert, cooperative, mild distress, appears stated age  LUNG: clear to auscultation bilaterally  HEART: regular rate and rhythm, S1, S2 normal, no murmur, click, rub or gallop  ABDOMEN: abnormal findings:  distended  EXTREMITIES:  extremities normal, atraumatic, no cyanosis or edema    Lab/Data Review:  CMP:   Lab Results   Component Value Date/Time     09/19/2017 04:00 AM    K 3.8 09/19/2017 04:00 AM     09/19/2017 04:00 AM    CO2 23 09/19/2017 04:00 AM    AGAP 11 09/19/2017 04:00 AM     (H) 09/19/2017 04:00 AM    BUN 11 09/19/2017 04:00 AM    CREA 1.01 09/19/2017 04:00 AM    GFRAA >60 09/19/2017 04:00 AM    GFRNA >60 09/19/2017 04:00 AM    CA 7.6 (L) 09/19/2017 04:00 AM    ALB 2.0 (L) 09/19/2017 04:00 AM    TP 6.3 (L) 09/19/2017 04:00 AM    GLOB 4.3 (H) 09/19/2017 04:00 AM    AGRAT 0.5 (L) 09/19/2017 04:00 AM    SGOT 51 (H) 09/19/2017 04:00 AM    ALT 12 (L) 09/19/2017 04:00 AM     CBC:   Lab Results   Component Value Date/Time    WBC 6.1 09/19/2017 04:00 AM    HGB 8.2 (L) 09/19/2017 04:00 AM    HCT 25.6 (L) 09/19/2017 04:00 AM     09/19/2017 04:00 AM     COAGS:   Lab Results   Component Value Date/Time    PTP 16.9 (H) 09/19/2017 04:00 AM    INR 1.4 (H) 09/19/2017 04:00 AM     Liver Panel:   Lab Results   Component Value Date/Time    ALB 2.0 (L) 09/19/2017 04:00 AM    TP 6.3 (L) 09/19/2017 04:00 AM    GLOB 4.3 (H) 09/19/2017 04:00 AM    AGRAT 0.5 (L) 09/19/2017 04:00 AM    SGOT 51 (H) 09/19/2017 04:00 AM    ALT 12 (L) 09/19/2017 04:00 AM     (H) 09/19/2017 04:00 AM        Assessment:     Principal Problem:    Metastatic colon cancer to liver ([facility] (9/15/2017)    Active Problems:    DM (diabetes mellitus) ([address] (8/17/2013)      Diabetes ([address] (8/8/2016)      Hypertension (8/8/2016)      MYRON (acute kidney injury) ([address] (6/25/2017)        Plan: Will discuss future chemotherapy options. Follow-up with oncology. Signed By: Paul Akhtar     September 19, 2017          *ATTENTION:  This note has been created by a medical student for educational purposes only. Please do not refer to the content of this note for clinical decision-making, billing, or other purposes. Please see attending physicians note to obtain clinical information on this patient. *

## 2017-09-19 NOTE — DIABETES MGMT
GLYCEMIC CONTROL AND NUTRITION    Assessment/Recommendations:  Lab glucose this am 337 mg/dL. Levemir 10 units added daily  Patient using home Novolog with meals  Will continue inpatient monitoring. Most recent blood glucose values:    Current A1C of 9.0 % is equivalent to average blood glucose of 212 mg/dl over the past 2-3 months. Current hospital diabetes medications:   Using insulin pens from home:  novolog 22 units with breakfast and lunch. Novolog 25 units with dinner.   Home diabetes medications:  30 units Humalog AC (pens  Diet:    Dental soft with supplements  Education:  __x_Refer to Diabetes Education Record             ____Education not indicated at this time      Teri Cheung RN

## 2017-09-19 NOTE — PROGRESS NOTES
Liver biopsy competed yesterday as well as a paracentesis. Patient had been receiving chemotherapy  in [place]. Oncology to follow. Discharge plan is to return home.   Jeet Valles RN

## 2017-09-19 NOTE — PROGRESS NOTES
Bedside shift report received from [address] [place] [address] 321 By N: Farshad Hampton, on room air, resting on his right side, with regular, nonlabored breathing; IVF infusing well; shift assessment completed; no complaints voiced at this time    2023: Morphine 5 mg given IV for c/o 8/10 backpain; no other complaints voiced    2230: quietly resting, no further complaints voiced    0031: awake, requested for pain med; Morphine 5 mg given IV- see flowsheet; - said he will start Insulin in am when breakfast comes    0130: quietly sleeping; no apparent distress noted    0300: quietly sleeping; no apparent distress    0502: Morphine 5 mg given IV as requested for pain- see flowsheet    0600: verbalized feeling better; IVF infusing well    0715: Bedside and Verbal shift change report given to Joby Benavides RN (oncoming nurse) by Radha Dawkins RN (offgoing nurse). Report included the following information SBAR, Kardex and Recent Results.

## 2017-09-19 NOTE — PROGRESS NOTES
Progress Note      Patient: Mohini Rogers               Sex: male          DOA: 9/15/2017       YOB: 1965      Age:  50 y.o.        LOS:  LOS: 4 days               Subjective:   Pt is s/p biopsy of the liver mass . The path report shows that he has  Metastatic adenocarcinoma similar to a colonic primary . The pt was told that he needs to make a decision and resume chemotherapy . His ascites are also malignant  adenocarcinoma       Objective:      Visit Vitals    /71 (BP 1 Location: Right arm, BP Patient Position: Sitting)    Pulse (!) 111    Temp 98.5 °F (36.9 °C)    Resp 18    Ht 5' 9\" (1.753 m)    Wt 81.2 kg (179 lb)    SpO2 97%    BMI 26.43 kg/m2       Physical Exam:  Pt is awake and feels comfortable   heart reg rate and rhythm   Lungs good breath sounds heard   Abdomen ascites present . tenderness of the liver   Neuro nonfocal      Lab/Data Reviewed:  CMP:   Lab Results   Component Value Date/Time     09/19/2017 04:00 AM    K 3.8 09/19/2017 04:00 AM     09/19/2017 04:00 AM    CO2 23 09/19/2017 04:00 AM    AGAP 11 09/19/2017 04:00 AM     (H) 09/19/2017 04:00 AM    BUN 11 09/19/2017 04:00 AM    CREA 1.01 09/19/2017 04:00 AM    GFRAA >60 09/19/2017 04:00 AM    GFRNA >60 09/19/2017 04:00 AM    CA 7.6 (L) 09/19/2017 04:00 AM    ALB 2.0 (L) 09/19/2017 04:00 AM    TP 6.3 (L) 09/19/2017 04:00 AM    GLOB 4.3 (H) 09/19/2017 04:00 AM    AGRAT 0.5 (L) 09/19/2017 04:00 AM    SGOT 51 (H) 09/19/2017 04:00 AM    ALT 12 (L) 09/19/2017 04:00 AM     CBC:   Lab Results   Component Value Date/Time    WBC 6.1 09/19/2017 04:00 AM    HGB 8.2 (L) 09/19/2017 04:00 AM    HCT 25.6 (L) 09/19/2017 04:00 AM     09/19/2017 04:00 AM           Assessment/Plan     Principal Problem:    Metastatic colon cancer to liver ([facility] (9/15/2017)    Active Problems:    DM (diabetes mellitus) ([facility] (8/17/2013)      Diabetes ([facility] (8/8/2016)      Hypertension (8/8/2016)      MYRON (acute kidney injury) ([facility] (6/25/2017)        Plan: pt was encouraged to resume chemotherapy either in [place] or in [facility] he promised to discuss this with his wife

## 2017-09-20 LAB
GLUCOSE BLD STRIP.AUTO-MCNC: 192 MG/DL (ref 70–110)
GLUCOSE BLD STRIP.AUTO-MCNC: 201 MG/DL (ref 70–110)
GLUCOSE BLD STRIP.AUTO-MCNC: 248 MG/DL (ref 70–110)
GLUCOSE BLD STRIP.AUTO-MCNC: 91 MG/DL (ref 70–110)
GLUCOSE BLD STRIP.AUTO-MCNC: 94 MG/DL (ref 70–110)
INR PPP: 1.4 (ref 0.8–1.2)
PROTHROMBIN TIME: 16.7 SEC (ref 11.5–15.2)

## 2017-09-20 PROCEDURE — 85610 PROTHROMBIN TIME: CPT | Performed by: INTERNAL MEDICINE

## 2017-09-20 PROCEDURE — 36415 COLL VENOUS BLD VENIPUNCTURE: CPT | Performed by: INTERNAL MEDICINE

## 2017-09-20 PROCEDURE — 74011250636 HC RX REV CODE- 250/636: Performed by: INTERNAL MEDICINE

## 2017-09-20 PROCEDURE — 82962 GLUCOSE BLOOD TEST: CPT

## 2017-09-20 PROCEDURE — 65270000029 HC RM PRIVATE

## 2017-09-20 PROCEDURE — 74011636637 HC RX REV CODE- 636/637: Performed by: HOSPITALIST

## 2017-09-20 PROCEDURE — 77030020263 HC SOL INJ SOD CL0.9% LFCR 1000ML

## 2017-09-20 PROCEDURE — 74011250637 HC RX REV CODE- 250/637: Performed by: INTERNAL MEDICINE

## 2017-09-20 RX ADMIN — INSULIN DETEMIR 5 UNITS: 100 INJECTION, SOLUTION SUBCUTANEOUS at 21:53

## 2017-09-20 RX ADMIN — MORPHINE SULFATE 5 MG: 10 INJECTION INTRAMUSCULAR; INTRAVENOUS; SUBCUTANEOUS at 17:41

## 2017-09-20 RX ADMIN — MORPHINE SULFATE 5 MG: 10 INJECTION INTRAMUSCULAR; INTRAVENOUS; SUBCUTANEOUS at 11:58

## 2017-09-20 RX ADMIN — MORPHINE SULFATE 5 MG: 10 INJECTION INTRAMUSCULAR; INTRAVENOUS; SUBCUTANEOUS at 21:53

## 2017-09-20 RX ADMIN — GABAPENTIN 800 MG: 400 CAPSULE ORAL at 10:20

## 2017-09-20 RX ADMIN — CYCLOBENZAPRINE HYDROCHLORIDE 10 MG: 10 TABLET, FILM COATED ORAL at 21:53

## 2017-09-20 RX ADMIN — SODIUM CHLORIDE 125 ML/HR: 900 INJECTION, SOLUTION INTRAVENOUS at 07:53

## 2017-09-20 RX ADMIN — PANTOPRAZOLE SODIUM 40 MG: 40 TABLET, DELAYED RELEASE ORAL at 09:00

## 2017-09-20 RX ADMIN — SODIUM CHLORIDE 125 ML/HR: 900 INJECTION, SOLUTION INTRAVENOUS at 16:44

## 2017-09-20 RX ADMIN — MORPHINE SULFATE 5 MG: 10 INJECTION INTRAMUSCULAR; INTRAVENOUS; SUBCUTANEOUS at 02:50

## 2017-09-20 RX ADMIN — AMLODIPINE BESYLATE 5 MG: 5 TABLET ORAL at 10:20

## 2017-09-20 RX ADMIN — FERROUS SULFATE TAB 325 MG (65 MG ELEMENTAL FE) 325 MG: 325 (65 FE) TAB at 10:20

## 2017-09-20 RX ADMIN — GABAPENTIN 800 MG: 400 CAPSULE ORAL at 17:44

## 2017-09-20 RX ADMIN — ATORVASTATIN CALCIUM 40 MG: 40 TABLET, FILM COATED ORAL at 10:20

## 2017-09-20 RX ADMIN — MORPHINE SULFATE 5 MG: 10 INJECTION INTRAMUSCULAR; INTRAVENOUS; SUBCUTANEOUS at 08:56

## 2017-09-20 RX ADMIN — CYCLOBENZAPRINE HYDROCHLORIDE 10 MG: 10 TABLET, FILM COATED ORAL at 17:44

## 2017-09-20 RX ADMIN — PANTOPRAZOLE SODIUM 40 MG: 40 TABLET, DELAYED RELEASE ORAL at 17:44

## 2017-09-20 RX ADMIN — INSULIN ASPART 22 UNITS: 100 INJECTION, SOLUTION INTRAVENOUS; SUBCUTANEOUS at 09:02

## 2017-09-20 RX ADMIN — INSULIN DETEMIR 5 UNITS: 100 INJECTION, SOLUTION SUBCUTANEOUS at 12:01

## 2017-09-20 RX ADMIN — INSULIN ASPART 22 UNITS: 100 INJECTION, SOLUTION INTRAVENOUS; SUBCUTANEOUS at 13:44

## 2017-09-20 RX ADMIN — CYCLOBENZAPRINE HYDROCHLORIDE 10 MG: 10 TABLET, FILM COATED ORAL at 10:20

## 2017-09-20 NOTE — PROGRESS NOTES
Patient received at [phone]  from [facility], A&Ox4, in bed, awake. Pt does not appear to be in distress and denies any pain and/or discomfort. Safety measures take include bed in lowest locked position, call bell within reach, and personal items at bedside within reach. Pt verbalizes understanding of use of call bell and the need to call for assistance to ensure safety. 65 Spoke with wife who asked that the pt be discharged tomorrow afternoon because the paperwork was ready and the oncologist up in [place] would be ready to accept him on Friday morning. I called and spoke with Dr. Jae Cash who verbalized understanding and said he would be discharging the patient tomorrow morning. 1740: spoke with patient and patient's wife. Informed them of my conversation with Dr. Jae Cash per [address] discharge. Verbalized understanding. 1940: Bedside and Verbal shift change report given to [id number] and Mariya RN (oncoming nurse) by Sridhar Pena, Nurse Bret Velázquez (offgoing nurse). Report included the following information SBAR, Kardex, Intake/Output, MAR and Recent Results.

## 2017-09-20 NOTE — PROGRESS NOTES
Problem: Falls - Risk of  Goal: *Absence of Falls  Document Maryse Fall Risk and appropriate interventions in the flowsheet.    Outcome: Progressing Towards Goal  Fall Risk Interventions:              Medication Interventions: Evaluate medications/consider consulting pharmacy, Patient to call before getting OOB, Teach patient to arise slowly           History of Falls Interventions: Consult care management for discharge planning

## 2017-09-20 NOTE — PROGRESS NOTES
1915 Report received from Ronda Gee RN,including sbar,mar,kardex, I and o,plus results. Patient went down for ct abd/pelvis. 2000 Returned same,requested pain meds. Morphine 5mgs ivp with relief. Family at bedside. 2200 Resting,family at bedside. Refused levemir insulin,accucheck  93. Ashley given. 0000 Sleeping  0250 Medicated for pain with morphine  0320 Good relief from analgesia.   0500 Sleeping  0630 Resting comfortably

## 2017-09-20 NOTE — PROGRESS NOTES
1940: Assumed patient care. Received report from Diana, [facility]. Report included SBAR, Kardex, and MAR.     0715: Bedside and Verbal shift change report given to JEY Ortiz (oncoming nurse) by Marlene Conklin RN (offgoing nurse). Report included the following information SBAR, Kardex and MAR.

## 2017-09-20 NOTE — PROGRESS NOTES
Per Dr. Minerva Bassett pt refusing Oncology tx/recommendations in [place]. Prefers to go to [place] for tx. Per Dr. Minerva Bassett without tx/recommendations from Oncology there is no further tx for pt in [place]. Dr. Minerva Bassett states he will discharge pt home today or tomorrow with the plan pt follow up in [place] as soon as possible for tx of metastatic ca.

## 2017-09-20 NOTE — PROGRESS NOTES
conducted a Follow up consultation and Spiritual Assessment for Fredi Mathur, who is a 50 y.o.,male. The  provided the following Interventions:  Continued the relationship of care and support. Listened empathically. Offered prayer and assurance of continued prayer on patients behalf. Chart reviewed. The following outcomes were achieved:  Patient expressed gratitude for pastoral care visit. Assessment:  There are no further spiritual or Cheondoism issues which require Spiritual Care Services interventions at this time. Plan:  Chaplains will continue to follow and will provide pastoral care on an as needed/requested basis.  recommends bedside caregivers page  on duty if patient shows signs of acute spiritual or emotional distress.      [facility]   Staff [address] [place] [address]   [phone]

## 2017-09-20 NOTE — PROGRESS NOTES
Problem: Falls - Risk of  Goal: *Absence of Falls  Document Maryse Fall Risk and appropriate interventions in the flowsheet.    Outcome: Progressing Towards Goal  Fall Risk Interventions:              Medication Interventions: Patient to call before getting OOB, Teach patient to arise slowly           History of Falls Interventions: Consult care management for discharge planning

## 2017-09-20 NOTE — PROGRESS NOTES
Progress Note      Patient: Ashley Guillen               Sex: male          DOA: 9/15/2017       YOB: 1965      Age:  50 y.o.        LOS:  LOS: 5 days               Subjective:   Discussed the pt and his wife . Pt has had a ct scan of the abdomen and a copy has been given to the patient . The pt is ill appearing . The ct scan   Shows a large hepatic mass  Which was not present on 8/24/16.he also has a large  Infiltrative necrotic mass  In the pancreas extending into the intza hepatis  With occlusion  of portal and  splenic veins  similar to recent  a recent study . There was  extensive  retroperitoneal  and mesenteric  adenopathy . .there was diffuse mesenteric  congestion  and interloop fluid  without significant  Change  . Carcinomatosis is also a consideration . There  Is large volume ascites  In the pelvis  And moderate  Ascites in paracolic spaces. The pt recent paracentesis reveals the presence of adenocarcinoma in the ascitic fluid . Suspect tthat the pt has carcinomatosis on the basis of finding malignant cells in the ascites theophylline pt and his wife were told thathe needed to have at least palliative chemotherapy . they have not made a decision on when to leave  For [place] . The pt has occlusion of the portal and the splenic veins and would need to be anticoagulated . however the rosa maria of bleeding is such that we will need to consult hem onc before making that decision    Objective:      Visit Vitals    /80 (BP 1 Location: Left arm, BP Patient Position: At rest)    Pulse (!) 118    Temp 98.7 °F (37.1 °C)    Resp 18    Ht 5' 9\" (1.753 m)    Wt 82.1 kg (181 lb)    SpO2 94%    BMI 26.73 kg/m2       Physical Exam:  Pt is depressed appearing   Heart reg rate and rhythm   Lungs decreased breath sounds in the bases   Abdomen soft ascites noted   Neuro nonfocal         Lab/Data Reviewed:  CMP: No results found for: NA, K, CL, CO2, AGAP, GLU, BUN, CREA, GFRAA, GFRNA, CA, MG, PHOS, ALB, TBIL, TP, ALB, GLOB, AGRAT, SGOT, ALT, GPT  CBC: No results found for: WBC, HGB, HGBEXT, HCT, HCTEXT, PLT, PLTEXT, HGBEXT, HCTEXT, PLTEXT        Assessment/Plan     Principal Problem:    Metastatic colon cancer to liver ([facility] (9/15/2017) and pancreas and pt has probable carcinoamatosis     Active Problems:    DM (diabetes mellitus) ([facility] (8/17/2013)      Di[facility] (8/8/2016)      Hypertension (8/8/2016)      MYRON (acute kidney injury) ([facility] (6/25/2017)        Plan: suspect that the pt has a poor prognosis.    the patient and his wife have still not made a decision on when to go to [place] vs being [facility]

## 2017-09-20 NOTE — ROUTINE PROCESS
1599 assumed care of pt after bedside verbal report was given by off going nurse, pt resting in bed awake, normal saline infusing at 125 ml/hr, will monitor for distress    0818 pt resting in bed awake, no acute distress noted, will monitor    0856 medication given for pain (see MAR), will monitor     1201 pt asleep in bed with sps at bedside, no distress noted, will monitor     1538 Bedside and Verbal shift change report given to Sherren Beal, RN (oncoming nurse) by JEY Krishnan (offgoing nurse). Report included the following information SBAR, Kardex and MAR.

## 2017-09-21 ENCOUNTER — HOME HEALTH ADMISSION (OUTPATIENT)
Dept: HOME HEALTH SERVICES | Facility: HOME HEALTH | Age: 52
End: 2017-09-21

## 2017-09-21 VITALS
TEMPERATURE: 98.6 F | SYSTOLIC BLOOD PRESSURE: 124 MMHG | OXYGEN SATURATION: 94 % | WEIGHT: 181 LBS | HEIGHT: 69 IN | BODY MASS INDEX: 26.81 KG/M2 | DIASTOLIC BLOOD PRESSURE: 77 MMHG | HEART RATE: 112 BPM | RESPIRATION RATE: 16 BRPM

## 2017-09-21 LAB
ALBUMIN SERPL-MCNC: 1.8 G/DL (ref 3.4–5)
ALBUMIN/GLOB SERPL: 0.5 {RATIO} (ref 0.8–1.7)
ALP SERPL-CCNC: 341 U/L (ref 45–117)
ALT SERPL-CCNC: 15 U/L (ref 16–61)
ANION GAP SERPL CALC-SCNC: 12 MMOL/L (ref 3–18)
AST SERPL-CCNC: 84 U/L (ref 15–37)
BASOPHILS # BLD: 0 K/UL (ref 0–0.06)
BASOPHILS NFR BLD: 0 % (ref 0–2)
BILIRUB SERPL-MCNC: 0.8 MG/DL (ref 0.2–1)
BUN SERPL-MCNC: 10 MG/DL (ref 7–18)
BUN/CREAT SERPL: 12 (ref 12–20)
CALCIUM SERPL-MCNC: 7 MG/DL (ref 8.5–10.1)
CHLORIDE SERPL-SCNC: 107 MMOL/L (ref 100–108)
CO2 SERPL-SCNC: 22 MMOL/L (ref 21–32)
CREAT SERPL-MCNC: 0.83 MG/DL (ref 0.6–1.3)
DIFFERENTIAL METHOD BLD: ABNORMAL
EOSINOPHIL # BLD: 0.1 K/UL (ref 0–0.4)
EOSINOPHIL NFR BLD: 1 % (ref 0–5)
ERYTHROCYTE [DISTWIDTH] IN BLOOD BY AUTOMATED COUNT: 17.4 % (ref 11.6–14.5)
GLOBULIN SER CALC-MCNC: 3.9 G/DL (ref 2–4)
GLUCOSE BLD STRIP.AUTO-MCNC: 133 MG/DL (ref 70–110)
GLUCOSE BLD STRIP.AUTO-MCNC: 261 MG/DL (ref 70–110)
GLUCOSE SERPL-MCNC: 138 MG/DL (ref 74–99)
HCT VFR BLD AUTO: 24.6 % (ref 36–48)
HGB BLD-MCNC: 7.8 G/DL (ref 13–16)
INR PPP: 1.5 (ref 0.8–1.2)
LYMPHOCYTES # BLD: 1 K/UL (ref 0.9–3.6)
LYMPHOCYTES NFR BLD: 12 % (ref 21–52)
MCH RBC QN AUTO: 25.4 PG (ref 24–34)
MCHC RBC AUTO-ENTMCNC: 31.7 G/DL (ref 31–37)
MCV RBC AUTO: 80.1 FL (ref 74–97)
MONOCYTES # BLD: 0.9 K/UL (ref 0.05–1.2)
MONOCYTES NFR BLD: 11 % (ref 3–10)
NEUTS SEG # BLD: 6.6 K/UL (ref 1.8–8)
NEUTS SEG NFR BLD: 76 % (ref 40–73)
PLATELET # BLD AUTO: 196 K/UL (ref 135–420)
PMV BLD AUTO: 9.8 FL (ref 9.2–11.8)
POTASSIUM SERPL-SCNC: 3.5 MMOL/L (ref 3.5–5.5)
PROT SERPL-MCNC: 5.7 G/DL (ref 6.4–8.2)
PROTHROMBIN TIME: 17.3 SEC (ref 11.5–15.2)
RBC # BLD AUTO: 3.07 M/UL (ref 4.7–5.5)
SODIUM SERPL-SCNC: 141 MMOL/L (ref 136–145)
WBC # BLD AUTO: 8.7 K/UL (ref 4.6–13.2)

## 2017-09-21 PROCEDURE — 85610 PROTHROMBIN TIME: CPT | Performed by: INTERNAL MEDICINE

## 2017-09-21 PROCEDURE — 82962 GLUCOSE BLOOD TEST: CPT

## 2017-09-21 PROCEDURE — 74011250637 HC RX REV CODE- 250/637: Performed by: INTERNAL MEDICINE

## 2017-09-21 PROCEDURE — 74011250637 HC RX REV CODE- 250/637: Performed by: HOSPITALIST

## 2017-09-21 PROCEDURE — 77030020263 HC SOL INJ SOD CL0.9% LFCR 1000ML

## 2017-09-21 PROCEDURE — 36415 COLL VENOUS BLD VENIPUNCTURE: CPT | Performed by: INTERNAL MEDICINE

## 2017-09-21 PROCEDURE — 74011636637 HC RX REV CODE- 636/637: Performed by: HOSPITALIST

## 2017-09-21 PROCEDURE — 74011250636 HC RX REV CODE- 250/636: Performed by: INTERNAL MEDICINE

## 2017-09-21 PROCEDURE — 80053 COMPREHEN METABOLIC PANEL: CPT | Performed by: INTERNAL MEDICINE

## 2017-09-21 PROCEDURE — 85025 COMPLETE CBC W/AUTO DIFF WBC: CPT | Performed by: INTERNAL MEDICINE

## 2017-09-21 RX ORDER — FENTANYL 25 UG/1
1 PATCH TRANSDERMAL
Status: DISCONTINUED | OUTPATIENT
Start: 2017-09-21 | End: 2017-09-21 | Stop reason: HOSPADM

## 2017-09-21 RX ORDER — FENTANYL 25 UG/1
1 PATCH TRANSDERMAL
Qty: 5 PATCH | Refills: 0 | Status: SHIPPED | OUTPATIENT
Start: 2017-09-21

## 2017-09-21 RX ADMIN — INSULIN DETEMIR 5 UNITS: 100 INJECTION, SOLUTION SUBCUTANEOUS at 09:28

## 2017-09-21 RX ADMIN — GABAPENTIN 800 MG: 400 CAPSULE ORAL at 09:30

## 2017-09-21 RX ADMIN — PANTOPRAZOLE SODIUM 40 MG: 40 TABLET, DELAYED RELEASE ORAL at 09:30

## 2017-09-21 RX ADMIN — MORPHINE SULFATE 5 MG: 10 INJECTION INTRAMUSCULAR; INTRAVENOUS; SUBCUTANEOUS at 03:03

## 2017-09-21 RX ADMIN — ATORVASTATIN CALCIUM 40 MG: 40 TABLET, FILM COATED ORAL at 09:31

## 2017-09-21 RX ADMIN — INSULIN ASPART 22 UNITS: 100 INJECTION, SOLUTION INTRAVENOUS; SUBCUTANEOUS at 12:36

## 2017-09-21 RX ADMIN — SODIUM CHLORIDE 125 ML/HR: 900 INJECTION, SOLUTION INTRAVENOUS at 03:06

## 2017-09-21 RX ADMIN — AMLODIPINE BESYLATE 5 MG: 5 TABLET ORAL at 09:30

## 2017-09-21 RX ADMIN — CYCLOBENZAPRINE HYDROCHLORIDE 10 MG: 10 TABLET, FILM COATED ORAL at 09:31

## 2017-09-21 RX ADMIN — MORPHINE SULFATE 5 MG: 10 INJECTION INTRAMUSCULAR; INTRAVENOUS; SUBCUTANEOUS at 09:29

## 2017-09-21 NOTE — PROGRESS NOTES
Met with the pt to discuss home care. Pt is agreeable for a [facility] visit. Pt verified the contact information on the face sheet is correct. Referral sent to intake via [facility] and called to the [address] [place] [address] for f/u.

## 2017-09-21 NOTE — PROGRESS NOTES
NUTRITION follow-up/Plan of care    RECOMMENDATIONS:   1. Dental soft diet  2. Ensure TID  3. Monitor weight and PO intake  4. RD to follow    GOALS:   1. Ongoing: PO intake meets >75% of protein/calorie needs by 9/26  2. Ongoing: Maintain weight (+/- 1-2 lb) by 9/28    ASSESSMENT:   Per BMI of 26.7, weight is in the overweight classification. PO intake is fair. Labs noted. BG range in the last 24 hours: . Nutrition recommendations listed. RD to follow. Nutrition Risk:  []   High [x]  Moderate [] Low    SUBJECTIVE/OBJECTIVE:   Pt with PMHX of Colon CA s/p hemicolectomy and chemo, T2DM, MANNY who presented  to ED after recent CT abd, liver mass, portal vein thrombosis and is s/p U/S guided paracentesis and U/S liver biopsy 9/18. Pt reported GI flu like illness for 2 weeks with poor appetite and worse abdominal swelling. Pt reports he was unable to eat for several weeks PTA due to \"all of this fluid\". Pt denies having food allergies or problem with chewing or swallowing. Glycemic control team is following. Per chart review, weight was 194 lb on 8/8. However, difficult to assess weight status with fluid fluctuations from ascites. Patient likes Ensure and would drink it 3 times a day. Information Obtained From:   [x] Chart Review  [x] Patient  [] Family/Caregiver  [] Nurse/Physician   [] Patient Rounds/Interdisciplinary Meeting    Diet: Dental soft  Patient Vitals for the past 100 hrs:   % Diet Eaten   09/20/17 1024 0 %   09/17/17 1302 80 %   09/17/17 0955 75 %     Medications: [x] Reviewed (Levemir, 0.9%NaCl: 125 ml/hr)  Encounter Diagnoses     ICD-10-CM ICD-9-CM   1. Portal vein thrombosis I81 452   2. Low hemoglobin D64.9 285.9   3. Lower GI bleed K92.2 578.9   4.  Elevated INR R79.1 790.92     Past Medical History:   Diagnosis Date    [facility]     Diabetes ([facility]     Hypercholesteremia     Hypertension     Sleep apnea     C-PAP     Labs:  Lab Results   Component Value Date/Time    Sodium 141 09/21/2017 04:22 AM    Potassium 3.5 09/21/2017 04:22 AM    Chloride 107 09/21/2017 04:22 AM    CO2 22 09/21/2017 04:22 AM    Anion gap 12 09/21/2017 04:22 AM    Glucose 138 09/21/2017 04:22 AM    BUN 10 09/21/2017 04:22 AM    Creatinine 0.83 09/21/2017 04:22 AM    Calcium 7.0 09/21/2017 04:22 AM    Magnesium 1.9 07/02/2017 07:46 AM    Phosphorus 2.4 06/26/2017 03:11 PM    Albumin 1.8 09/21/2017 04:22 AM     Anthropometrics: BMI (calculated): 26.7 Last 3 Recorded Weights in this Encounter    09/17/17 0545 09/19/17 0503 09/20/17 0650   Weight: 85 kg (187 lb 8 oz) 81.2 kg (179 lb) 82.1 kg (181 lb)    Ht Readings from Last 1 Encounters:   09/17/17 5' 9\" (1.753 m)     [x]  Weight Loss (fluid)  []  Weight Gain  []  Weight Stable   []  New wt n/a on record     Estimated Nutrition Needs:   1269 Kcals/day  Protein (g): 128 g    Nutrition Problems Identified:   [x] Suboptimal PO intake   [] Food Allergies  [] Difficulty chewing/swallowing/poor dentition  [] Constipation/Diarrhea   [] Nausea/Vomiting   [] None  [] Other:     Plan:   [x] Therapeutic Diet  []  Obtained/adjusted food preferences/tolerances and/or snacks options   [x]  Supplements added   [] Occupational therapy following for feeding techniques  []  HS snack added   [x]  Modify diet texture   []  Modify diet for food allergies   []  Educate patient   []  Assist with menu selection   [x]  Monitor PO intake on meal rounds   [x]  Continue inpatient monitoring and intervention   []  Participated in discharge planning/Interdisciplinary rounds/Team meetings   []  Other:     Education Needs:   [] Not appropriate for teaching at this time due to:   [x] Identified and addressed    Nutrition Monitoring and Evaluation:   [x] Continue inpatient monitoring and interventions    [] Other:     Gabby Dalal, [address] [place] [address]  Pager: [phone]

## 2017-09-21 NOTE — PROGRESS NOTES
Care Management Interventions  PCP Verified by CM: Yes  Palliative Care Consult (Criteria: CHF and RRAT>21): No  Reason for No Palliative Care Consult:  (na)  Mode of Transport at Discharge:  Other (see comment) (wife)  Transition of Care Consult (CM Consult): Home Health [facility])  [address]: Yes  Physical Therapy Consult: No  Occupational Therapy Consult: No  Speech Therapy Consult: No  Current Support Network: Lives with Spouse  Confirm Follow Up Transport: Family  Plan discussed with Pt/Family/Caregiver: Yes  Freedom of Choice Offered: Yes  Discharge Location  Discharge Placement: Home with home health

## 2017-09-21 NOTE — PROGRESS NOTES
Problem: Falls - Risk of  Goal: *Absence of Falls  Document Maryse Fall Risk and appropriate interventions in the flowsheet.    Outcome: Progressing Towards Goal  Fall Risk Interventions:              Medication Interventions: Evaluate medications/consider consulting pharmacy           History of Falls Interventions: Consult care management for discharge planning           Problem: General Medical Care Plan  Goal: *Optimize nutritional status  Outcome: Not Progressing Towards Goal  Variance: Patient Condition

## 2017-09-21 NOTE — PROGRESS NOTES
Problem: Falls - Risk of  Goal: *Absence of Falls  Document Maryse Fall Risk and appropriate interventions in the flowsheet.    Outcome: Progressing Towards Goal  Fall Risk Interventions:              Medication Interventions: Evaluate medications/consider consulting pharmacy           History of Falls Interventions: Consult care management for discharge planning

## 2017-09-21 NOTE — DISCHARGE SUMMARY
Discharge Summary    Patient: Crow Jacobo               Sex: male          DOA: 9/15/2017         YOB: 1965      Age:  50 y.o.        LOS:  LOS: 6 days                Admit Date: 9/15/2017    Discharge Date: 9/21/2017    Admission Diagnoses: Metastatic colon cancer to [facility])    Discharge Diagnoses:    Problem List as of 9/21/2017  Date Reviewed: 8/17/2016          Codes Class Noted - Resolved    * (Principal)Metastatic colon cancer to [facility]) ICD-10-CM: C18.9, C78.7  ICD-9-CM: 153.9, 197.7  9/15/2017 - Present        Sepsis ([facility] ICD-10-CM: A41.9  ICD-9-CM: 038.9, 995.91  6/25/2017 - Present        Hypokalemia ICD-10-CM: E87.6  ICD-9-CM: 276.8  6/25/2017 - Present        Lactic acidosis ICD-10-CM: E87.2  ICD-9-CM: 276.2  6/25/2017 - Present        Leukocytosis ICD-10-CM: D72.829  ICD-9-CM: 288.60  6/25/2017 - Present        MYRON (acute kidney injury) ([facility] ICD-10-CM: N17.9  ICD-9-CM: 584.9  6/25/2017 - Present        Colon carcinoma ([facility] ICD-10-CM: C18.9  ICD-9-CM: 153.9  8/17/2016 - Present        Anemia ICD-10-CM: D64.9  ICD-9-CM: 285.9  8/8/2016 - Present        GI bleeding ICD-10-CM: K92.2  ICD-9-CM: 578.9  8/8/2016 - Present        Diabetes ([facility] (Chronic) ICD-10-CM: E11.9  ICD-9-CM: 250.00  8/8/2016 - Present        Hypertension (Chronic) ICD-10-CM: I10  ICD-9-CM: 401.9  8/8/2016 - Present        Sleep apnea (Chronic) ICD-10-CM: G47.30  ICD-9-CM: 780.57  8/8/2016 - Present        Other and unspecified noninfectious gastroenteritis and colitis ICD-10-CM: K52.9  ICD-9-CM: 558.9  8/17/2013 - Present        Renal insufficiency ICD-10-CM: N28.9  ICD-9-CM: 593.9  8/17/2013 - Present        Hyperglycemia ICD-10-CM: R73.9  ICD-9-CM: 790.29  8/17/2013 - Present        DM (diabetes mellitus) ([facility] ICD-10-CM: E11.9  ICD-9-CM: 250.00  8/17/2013 - Present        Low magnesium levels ICD-10-CM: R79.0  ICD-9-CM: 790.6  8/17/2013 - Present        Nausea with vomiting ICD-10-CM: R11.2  ICD-9-CM: 787.01  8/16/2013 - Present        Rash ICD-10-CM: R21  ICD-9-CM: 782.1  8/16/2013 - Present        RESOLVED: Sepsis ([facility] ICD-10-CM: A41.9  ICD-9-CM: 995.91  8/16/2013 - 8/17/2013              Discharge Condition:  Improved    Hospital Course: pt is a 50year old male who has a h/o colon cancer and  Is s/p left hemilectomy  And has had 8 rounds of chemotherapy . The pt and his family thought that his cancer tl treated with complete resolution . He  Is followed by the cancer centers of [address] [place] [address] . The pt initially had a flu like illness with nausea and vomiting he had imaging studies done on admission and this showed  Liver mets and portal vein thrombosis . He was seen by hem onc but the pt and his family did not want to use the local oncologists . He then had a liver biopsy and a paracentesis done biopsy of the liver mass showed the presence of metastatic adenocarcinoma compatible with a colon primary. the pt had a paracentesis and 2 litres of fluid were removed . No malignant cells were noted after a further review the pt had a ct scan of the abdomen and pelvis with iv contrast and this showed a large infiltrative necrotic mass  In the pancreas  Extending into the nitza hepatis  with occlusion of there portal and  And sp,lenic veins  . there was  a large  Right hepatic  Lobe mass  Likely metastatic  And difficult  To  Compare to a recent study  Done without contrast  The mass was not present on the study of 8/24/16 .  The pt was given a [employer] lpatch and he barry go to [place] for further treatment of his colon cancer           Consults:    Treatment Team: Attending Provider: Tabby Bryant MD; Scribe: Juan Arroyo; Consulting Provider: Ryan Lees MD; Consulting Provider: KAREN Loaiza; Utilization Review: Holly Avendaño RN    Significant Diagnostic Studies:     Discharge Medications:     Current Discharge Medication List      START taking these medications    Details fentaNYL (DURAGESIC) 25 mcg/hr PATCH 1 Patch by TransDERmal route every seventy-two (72) hours. Max Daily Amount: 1 Patch. Qty: 5 Patch, Refills: 0         CONTINUE these medications which have NOT CHANGED    Details   atorvastatin (LIPITOR) 40 mg tablet Take 40 mg by mouth daily. ferrous sulfate (IRON) 325 mg (65 mg iron) tablet Take  by mouth three (3) times daily (with meals). cyanocobalamin (VITAMIN B-12) 1,000 mcg tablet Take 4,000 mcg by mouth daily. cholecalciferol, VITAMIN D3, (VITAMIN D3) 5,000 unit tab tablet Take 5,000 Units by mouth daily. Indications: PREVENTION OF VITAMIN D DEFICIENCY      oxyCODONE-acetaminophen (PERCOCET) 5-325 mg per tablet Take 1 Tab by mouth every four (4) hours as needed for Pain. Max Daily Amount: 6 Tabs. Qty: 20 Tab, Refills: 0      erythromycin 500 mg tablet Take one tab at 3 PM, 5 PM, and 9 PM the day before surgery. Qty: 3 Tab, Refills: 0    Associated Diagnoses: Colon carcinoma ([facility] Iron deficiency anemia due to chronic blood loss      losartan (COZAAR) 100 mg tablet Take 100 mg by mouth daily. amLODIPine (NORVASC) 10 mg tablet Take 5 mg by mouth daily. insulin aspart (NOVOLOG) 100 unit/mL injection 30 Units by SubCUTAneous route Before breakfast, lunch, and dinner. insulin detemir (LEVEMIR) 100 unit/mL injection 20 Units by SubCUTAneous route two (2) times a day. cyclobenzaprine (FLEXERIL) 10 mg tablet       gabapentin (NEURONTIN) 400 mg capsule Take 800 mg by mouth two (2) times a day. STOP taking these medications       pantoprazole (PROTONIX) 40 mg tablet Comments:   Reason for Stopping:               Activity: as toerated    Diet:cardiac    Wound Care: none    Follow-up:   With pt's oncologist in [address]

## 2017-09-21 NOTE — DISCHARGE INSTRUCTIONS
Colon Cancer: Care Instructions  Your Care Instructions    Colon cancer occurs when abnormal cells grow out of control in the lower part of your intestine (your colon). If the tumor was small and had not spread, your doctor may have removed it during the colonoscopy. But you may need surgery to remove the cancer if the tumor was too big or had spread too far to be removed during a colonoscopy. If cancer has spread to another part of your body, such as the liver, you may need more far-reaching surgery. Treatment for colon cancer may also include radiation therapy and medicines that destroy cancer cells (chemotherapy). Being treated for cancer can weaken your body, and you may feel very tired. Get the rest your body needs so that you can feel better. When you find out that you have cancer, you may feel many emotions and may need some help coping. Seek out family, friends, and counselors for support. You also can do things at home to make yourself feel better while you go through treatment. Call the [facility] ([phone]) or visit its website at [employer] for more information. Follow-up care is a key part of your treatment and safety. Be sure to make and go to all appointments, and call your doctor if you are having problems. It's also a good idea to know your test results and keep a list of the medicines you take. How can you care for yourself at home? · Take your medicines exactly as prescribed. Call your doctor if you think you are having a problem with your medicine. You may get medicine for nausea and vomiting if you have these side effects. · Follow your doctor's instructions to relieve pain. Pain from cancer and surgery can almost always be controlled. Use pain medicine when you first notice pain, before it becomes severe. · Eat healthy food. If you do not feel like eating, try to eat food that has protein and extra calories to keep up your strength and prevent weight loss.  Drink liquid meal replacements for extra calories and protein. Try to eat your main meal early. · Get some physical activity every day, but do not get too tired. Keep doing the hobbies you enjoy as your energy allows. · Take steps to control your stress and workload. Learn relaxation techniques. ¨ Share your feelings. Stress and tension affect our emotions. By expressing your feelings to others, you may be able to understand and cope with them. ¨ Consider joining a support group. Talking about a problem with your spouse, a good friend, or other people with similar problems is a good way to reduce tension and stress. ¨ Express yourself through art. Try writing, dance, art, or crafts to relieve stress. Some dance, writing, or art groups may be available just for people who have cancer. ¨ Be kind to your body and mind. Getting enough sleep, eating a healthy diet, and taking time to do things you enjoy can contribute to an overall feeling of balance in your life and help reduce stress. ¨ Get help if you need it. Discuss your concerns with your doctor or counselor. · If you are vomiting or have diarrhea:  ¨ Drink plenty of fluids (enough so that your urine is light yellow or clear like water) to prevent dehydration. Choose water and other caffeine-free clear liquids. If you have kidney, heart, or liver disease and have to limit fluids, talk with your doctor before you increase the amount of fluids you drink. ¨ When you are able to eat, try clear soups, mild foods, and liquids until all symptoms are gone for 12 to 48 hours. Other good choices include dry toast, crackers, cooked cereal, and gelatin dessert, such as Jell-O.  · If you have not already done so, prepare a list of advance directives. Advance directives are instructions to your doctor and family members about what kind of care you want if you become unable to speak or express yourself. When should you call for help?   Call 911 anytime you think you may need emergency care. For example, call if:  · You pass maroon or very bloody stools. · You vomit blood or what looks like coffee grounds. · You have sudden chest pain and shortness of breath, or you cough up blood. · You have severe belly pain. Call your doctor now or seek immediate medical care if:  · You have signs of a blood clot, such as:  ¨ Pain in your calf, back of the knee, thigh, or groin. ¨ Redness and swelling in your leg or groin. · You have a fever. · You have nausea or vomiting. · You are very constipated or have diarrhea for several days. · Your stools are black and tarlike or have streaks of blood. Watch closely for changes in your health, and be sure to contact your doctor if:  · Your pain is not controlled by medicine. · Your symptoms get worse or are not improving as expected. Where can you learn more? Go to http://talia-anders.info/. Enter E930 in the search box to learn more about \"Colon Cancer: Care Instructions. \"  Current as of: July 26, 2016  Content Version: 11.3  © 4285-9337 [employer]. Care instructions adapted under license by [employer] (which disclaims liability or warranty for this information). If you have questions about a medical condition or this instruction, always ask your healthcare professional. [address] any warranty or liability for your use of this information. Anemia: Care Instructions  Your Care Instructions    Anemia is a low level of red blood cells, which carry oxygen throughout your body. Many things can cause anemia. Lack of iron is one of the most common causes. Your body needs iron to make hemoglobin, a substance in red blood cells that carries oxygen from the lungs to your body's cells. Without enough iron, the body produces fewer and smaller red blood cells. As a result, your body's cells do not get enough oxygen, and you feel tired and weak.  And you may have trouble concentrating. Bleeding is the most common cause of a lack of iron. You may have heavy menstrual bleeding or bleeding caused by conditions such as ulcers, hemorrhoids, or cancer. Regular use of aspirin or other anti-inflammatory medicines (such as ibuprofen) also can cause bleeding in some people. A lack of iron in your diet also can cause anemia, especially at times when the body needs more iron, such as during pregnancy, infancy, and the teen years. Your doctor may have prescribed iron pills. It may take several months of treatment for your iron levels to return to normal. Your doctor also may suggest that you eat foods that are rich in iron, such as meat and beans. There are many other causes of anemia. It is not always due to a lack of iron. Finding the specific cause of your anemia will help your doctor find the right treatment for you. Follow-up care is a key part of your treatment and safety. Be sure to make and go to all appointments, and call your doctor if you are having problems. It's also a good idea to know your test results and keep a list of the medicines you take. How can you care for yourself at home? · Take your medicines exactly as prescribed. Call your doctor if you think you are having a problem with your medicine. · If your doctor recommends iron pills, take them as directed:  ¨ Try to take the pills on an empty stomach about 1 hour before or 2 hours after meals. But you may need to take iron with food to avoid an upset stomach. ¨ Do not take antacids or drink milk or caffeine drinks (such as coffee, tea, or cola) at the same time or within 2 hours of the time that you take your iron. They can make it hard for your body to absorb the iron. ¨ Vitamin C (from food or supplements) helps your body absorb iron. Try taking iron pills with a glass of orange juice or some other food that is high in vitamin C, such as citrus fruits.   ¨ Iron pills may cause stomach problems, such as heartburn, nausea, diarrhea, constipation, and cramps. Be sure to drink plenty of fluids, and include fruits, vegetables, and fiber in your diet each day. Iron pills often make your bowel movements dark or green. ¨ If you forget to take an iron pill, do not take a double dose of iron the next time you take a pill. ¨ Keep iron pills out of the reach of small children. An overdose of iron can be very dangerous. · Follow your doctor's advice about eating iron-rich foods. These include red meat, shellfish, poultry, eggs, beans, raisins, whole-grain bread, and leafy green vegetables. · Steam vegetables to help them keep their iron content. When should you call for help? Call 911 anytime you think you may need emergency care. For example, call if:  · You have symptoms of a heart attack. These may include:  ¨ Chest pain or pressure, or a strange feeling in the chest.  ¨ Sweating. ¨ Shortness of breath. ¨ Nausea or vomiting. ¨ Pain, pressure, or a strange feeling in the back, neck, jaw, or upper belly or in one or both shoulders or arms. ¨ Lightheadedness or sudden weakness. ¨ A fast or irregular heartbeat. After you call 911, the  may tell you to chew 1 adult-strength or 2 to 4 low-dose aspirin. Wait for an ambulance. Do not try to drive yourself. · You passed out (lost consciousness). Call your doctor now or seek immediate medical care if:  · You have new or increased shortness of breath. · You are dizzy or lightheaded, or you feel like you may faint. · Your fatigue and weakness continue or get worse. · You have any abnormal bleeding, such as:  ¨ Nosebleeds. ¨ Vaginal bleeding that is different (heavier, more frequent, at a different time of the month) than what you are used to. ¨ Bloody or black stools, or rectal bleeding. ¨ Bloody or pink urine. Watch closely for changes in your health, and be sure to contact your doctor if:  · You do not get better as expected. Where can you learn more?   Go to http://talia-anders.info/. Enter R301 in the search box to learn more about \"Anemia: Care Instructions. \"  Current as of: October 13, 2016  Content Version: 11.3  © 4333-4534 [employer]. Care instructions adapted under license by [employer] (which disclaims liability or warranty for this information). If you have questions about a medical condition or this instruction, always ask your healthcare professional. [place] [address] any warranty or liability for your use of this information. Patient armband removed and shredded    DISCHARGE SUMMARY from Nurse    The following personal items are in your possession at time of discharge:    Dental Appliances: None  Visual Aid: Glasses, At home     Home Medications: None  Jewelry: None  Clothing: Undergarments, At bedside  Other Valuables: At bedside, Cell Phone  Personal Items Sent to Safe: none    PATIENT INSTRUCTIONS:    After general anesthesia or intravenous sedation, for 24 hours or while taking prescription Narcotics:  · Limit your activities  · Do not drive and operate hazardous machinery  · Do not make important personal or business decisions  · Do  not drink alcoholic beverages  · If you have not urinated within 8 hours after discharge, please contact your surgeon on call. Report the following to your surgeon:  · Excessive pain, swelling, redness or odor of or around the surgical area  · Temperature over 100.5  · Nausea and vomiting lasting longer than 4 hours or if unable to take medications  · Any signs of decreased circulation or nerve impairment to extremity: change in color, persistent  numbness, tingling, coldness or increase pain  · Any questions    What to do at Home:  Recommended activity: Activity as tolerated. If you experience any of the following symptoms listed under \"When to call for help\", please follow up with your PCP or call 911.     *  Please give a list of your current medications to your Primary Care Provider. *  Please update this list whenever your medications are discontinued, doses are      changed, or new medications (including over-the-counter products) are added. *  Please carry medication information at all times in case of emergency situations. These are general instructions for a healthy lifestyle:    No smoking/ No tobacco products/ Avoid exposure to second hand smoke    Surgeon General's Warning:  Quitting smoking now greatly reduces serious risk to your health. Obesity, smoking, and sedentary lifestyle greatly increases your risk for illness    A healthy diet, regular physical exercise & weight monitoring are important for maintaining a healthy lifestyle    You may be retaining fluid if you have a history of heart failure or if you experience any of the following symptoms:  Weight gain of 3 pounds or more overnight or 5 pounds in a week, increased swelling in our hands or feet or shortness of breath while lying flat in bed. Please call your doctor as soon as you notice any of these symptoms; do not wait until your next office visit. Recognize signs and symptoms of STROKE:    F-face looks uneven    A-arms unable to move or move unevenly    S-speech slurred or non-existent    T-time-call 911 as soon as signs and symptoms begin-DO NOT go       Back to bed or wait to see if you get better-TIME IS BRAIN. Warning Signs of HEART ATTACK     Call 911 if you have these symptoms:   Chest discomfort. Most heart attacks involve discomfort in the center of the chest that lasts more than a few minutes, or that goes away and comes back. It can feel like uncomfortable pressure, squeezing, fullness, or pain.  Discomfort in other areas of the upper body. Symptoms can include pain or discomfort in one or both arms, the back, neck, jaw, or stomach.  Shortness of breath with or without chest discomfort.    Other signs may include breaking out in a cold sweat, nausea, or lightheadedness. Don't wait more than five minutes to call 911 - MINUTES MATTER! Fast action can save your life. Calling 911 is almost always the fastest way to get lifesaving treatment. Emergency Medical Services staff can begin treatment when they arrive -- up to an hour sooner than if someone gets to the hospital by car. The discharge information has been reviewed with the patient. The patient verbalized understanding. Discharge medications reviewed with the patient and appropriate educational materials and side effects teaching were provided.

## 2017-09-21 NOTE — PROGRESS NOTES
Patient received at beginning of shift from [address]Ox[facility], in bed, awake. Pt does not appear to be in distress and denies any pain and/or discomfort. Safety measures take include bed in lowest locked position, call bell within reach, and personal items at bedside within reach. Pt verbalizes understanding of use of call bell and the need to call for assistance to ensure safety. 1236: D/c orders placed for patient. He reports that his wife will be here to get him between 3-4. After discharge, patient will be self driving to another facility in another state for cancer treatment. Due to pain management he will need to remain an in-patient until his wife comes to drive him.

## 2017-09-25 ENCOUNTER — HOME CARE VISIT (OUTPATIENT)
Dept: HOME HEALTH SERVICES | Facility: HOME HEALTH | Age: 52
End: 2017-09-25